# Patient Record
Sex: FEMALE | Race: WHITE | Employment: UNEMPLOYED | URBAN - METROPOLITAN AREA
[De-identification: names, ages, dates, MRNs, and addresses within clinical notes are randomized per-mention and may not be internally consistent; named-entity substitution may affect disease eponyms.]

---

## 2017-02-02 ENCOUNTER — OFFICE VISIT (OUTPATIENT)
Dept: SURGERY | Age: 42
End: 2017-02-02

## 2017-02-02 VITALS
HEART RATE: 92 BPM | DIASTOLIC BLOOD PRESSURE: 88 MMHG | SYSTOLIC BLOOD PRESSURE: 136 MMHG | HEIGHT: 67 IN | BODY MASS INDEX: 33.27 KG/M2 | WEIGHT: 212 LBS

## 2017-02-02 DIAGNOSIS — E27.40 ADRENAL INSUFFICIENCY (HCC): Primary | ICD-10-CM

## 2017-02-02 DIAGNOSIS — E31.22: ICD-10-CM

## 2017-02-02 DIAGNOSIS — E03.9 HYPOTHYROIDISM, UNSPECIFIED TYPE: ICD-10-CM

## 2017-02-02 PROCEDURE — 99203 OFFICE O/P NEW LOW 30 MIN: CPT | Performed by: INTERNAL MEDICINE

## 2017-02-02 RX ORDER — LEVOTHYROXINE SODIUM 175 UG/1
175 TABLET ORAL DAILY
Status: ON HOLD | COMMUNITY
Start: 2017-01-15 | End: 2019-08-09

## 2017-02-02 RX ORDER — ERGOCALCIFEROL 1.25 MG/1
50000 CAPSULE ORAL WEEKLY
COMMUNITY
Start: 2017-01-13 | End: 2017-03-24 | Stop reason: ALTCHOICE

## 2017-02-02 RX ORDER — CARISOPRODOL 350 MG/1
350 TABLET ORAL 3 TIMES DAILY PRN
Status: ON HOLD | COMMUNITY
Start: 2017-01-26 | End: 2019-08-09

## 2017-02-02 RX ORDER — MEDROXYPROGESTERONE ACETATE 5 MG/1
5 TABLET ORAL DAILY
COMMUNITY
Start: 2017-01-23

## 2017-02-02 ASSESSMENT — ENCOUNTER SYMPTOMS: SWOLLEN GLANDS: 0

## 2017-02-09 ASSESSMENT — ENCOUNTER SYMPTOMS: EYES NEGATIVE: 1

## 2017-03-24 ENCOUNTER — OFFICE VISIT (OUTPATIENT)
Dept: SURGERY | Age: 42
End: 2017-03-24

## 2017-03-24 VITALS
SYSTOLIC BLOOD PRESSURE: 147 MMHG | BODY MASS INDEX: 33.74 KG/M2 | WEIGHT: 215 LBS | HEIGHT: 67 IN | HEART RATE: 97 BPM | DIASTOLIC BLOOD PRESSURE: 86 MMHG

## 2017-03-24 DIAGNOSIS — E55.9 VITAMIN D DEFICIENCY: ICD-10-CM

## 2017-03-24 DIAGNOSIS — G25.0 ESSENTIAL TREMOR: ICD-10-CM

## 2017-03-24 DIAGNOSIS — E31.22: ICD-10-CM

## 2017-03-24 DIAGNOSIS — E89.0 POSTOPERATIVE HYPOTHYROIDISM: Primary | ICD-10-CM

## 2017-03-24 LAB
ANION GAP SERPL CALCULATED.3IONS-SCNC: 17 MEQ/L (ref 7–13)
BUN BLDV-MCNC: 13 MG/DL (ref 6–20)
CALCIUM SERPL-MCNC: 9.2 MG/DL (ref 8.6–10.2)
CHLORIDE BLD-SCNC: 99 MEQ/L (ref 98–107)
CO2: 18 MEQ/L (ref 22–29)
CREAT SERPL-MCNC: 0.34 MG/DL (ref 0.5–0.9)
GFR AFRICAN AMERICAN: >60
GFR NON-AFRICAN AMERICAN: >60
GLUCOSE BLD-MCNC: 76 MG/DL (ref 74–109)
POTASSIUM SERPL-SCNC: 4.6 MEQ/L (ref 3.5–5.1)
SODIUM BLD-SCNC: 134 MEQ/L (ref 132–144)
VITAMIN D 25-HYDROXY: 21.4 NG/ML (ref 30–100)

## 2017-03-24 PROCEDURE — 99213 OFFICE O/P EST LOW 20 MIN: CPT | Performed by: INTERNAL MEDICINE

## 2017-03-24 RX ORDER — ERGOCALCIFEROL 1.25 MG/1
50000 CAPSULE ORAL WEEKLY
Qty: 4 CAPSULE | Refills: 4 | Status: ON HOLD | OUTPATIENT
Start: 2017-03-24 | End: 2019-08-09

## 2017-03-27 ENCOUNTER — TELEPHONE (OUTPATIENT)
Dept: SURGERY | Age: 42
End: 2017-03-27

## 2017-03-27 ASSESSMENT — ENCOUNTER SYMPTOMS: SWOLLEN GLANDS: 0

## 2017-03-29 ENCOUNTER — TELEPHONE (OUTPATIENT)
Dept: SURGERY | Age: 42
End: 2017-03-29

## 2017-03-29 DIAGNOSIS — E31.22: Primary | ICD-10-CM

## 2017-04-03 ENCOUNTER — TELEPHONE (OUTPATIENT)
Dept: SURGERY | Age: 42
End: 2017-04-03

## 2019-08-08 ENCOUNTER — HOSPITAL ENCOUNTER (INPATIENT)
Age: 44
LOS: 5 days | Discharge: HOME | DRG: 750 | End: 2019-08-13
Attending: PSYCHIATRY & NEUROLOGY | Admitting: PSYCHIATRY & NEUROLOGY
Payer: MEDICAID

## 2019-08-08 PROCEDURE — 1240000000 HC EMOTIONAL WELLNESS R&B

## 2019-08-08 ASSESSMENT — LIFESTYLE VARIABLES: HISTORY_ALCOHOL_USE: YES

## 2019-08-08 ASSESSMENT — SLEEP AND FATIGUE QUESTIONNAIRES
DO YOU USE A SLEEP AID: NO
DO YOU HAVE DIFFICULTY SLEEPING: NO

## 2019-08-08 ASSESSMENT — PAIN SCALES - GENERAL: PAINLEVEL_OUTOF10: 0

## 2019-08-09 PROBLEM — F20.9 SCHIZOPHRENIA (HCC): Status: ACTIVE | Noted: 2019-08-09

## 2019-08-09 LAB
AMPHETAMINE SCREEN URINE: NEGATIVE
BARBITURATE SCREEN URINE: NEGATIVE
BENZODIAZEPINE SCREEN, URINE: NEGATIVE
BUPRENORPHINE URINE: NORMAL
CANNABINOID SCREEN URINE: NEGATIVE
COCAINE METABOLITE, URINE: NEGATIVE
HCG(URINE) PREGNANCY TEST: NEGATIVE
MDMA URINE: NORMAL
METHADONE SCREEN, URINE: NEGATIVE
METHAMPHETAMINE, URINE: NORMAL
OPIATES, URINE: NEGATIVE
OXYCODONE SCREEN URINE: NEGATIVE
PHENCYCLIDINE, URINE: NEGATIVE
PROPOXYPHENE, URINE: NORMAL
TEST INFORMATION: NORMAL
TRICYCLIC ANTIDEPRESSANTS, UR: NORMAL

## 2019-08-09 PROCEDURE — 6370000000 HC RX 637 (ALT 250 FOR IP): Performed by: PSYCHIATRY & NEUROLOGY

## 2019-08-09 PROCEDURE — 99222 1ST HOSP IP/OBS MODERATE 55: CPT | Performed by: NURSE PRACTITIONER

## 2019-08-09 PROCEDURE — 81025 URINE PREGNANCY TEST: CPT

## 2019-08-09 PROCEDURE — 1240000000 HC EMOTIONAL WELLNESS R&B

## 2019-08-09 PROCEDURE — 6370000000 HC RX 637 (ALT 250 FOR IP): Performed by: NURSE PRACTITIONER

## 2019-08-09 PROCEDURE — 80307 DRUG TEST PRSMV CHEM ANLYZR: CPT

## 2019-08-09 RX ORDER — LEVOTHYROXINE SODIUM 0.03 MG/1
25 TABLET ORAL DAILY
COMMUNITY

## 2019-08-09 RX ORDER — ESCITALOPRAM OXALATE 10 MG/1
5 TABLET ORAL DAILY
Status: DISCONTINUED | OUTPATIENT
Start: 2019-08-10 | End: 2019-08-10

## 2019-08-09 RX ORDER — TRAZODONE HYDROCHLORIDE 50 MG/1
50 TABLET ORAL NIGHTLY PRN
Status: DISCONTINUED | OUTPATIENT
Start: 2019-08-09 | End: 2019-08-13 | Stop reason: HOSPADM

## 2019-08-09 RX ORDER — LEVOTHYROXINE SODIUM 0.2 MG/1
200 TABLET ORAL DAILY
Status: DISCONTINUED | OUTPATIENT
Start: 2019-08-09 | End: 2019-08-13 | Stop reason: HOSPADM

## 2019-08-09 RX ORDER — MEDROXYPROGESTERONE ACETATE 2.5 MG/1
5 TABLET ORAL DAILY
Status: DISCONTINUED | OUTPATIENT
Start: 2019-08-09 | End: 2019-08-13 | Stop reason: HOSPADM

## 2019-08-09 RX ORDER — PANTOPRAZOLE SODIUM 40 MG/1
40 TABLET, DELAYED RELEASE ORAL
Status: DISCONTINUED | OUTPATIENT
Start: 2019-08-10 | End: 2019-08-13 | Stop reason: HOSPADM

## 2019-08-09 RX ORDER — LEVOTHYROXINE SODIUM 0.03 MG/1
25 TABLET ORAL DAILY
Status: DISCONTINUED | OUTPATIENT
Start: 2019-08-09 | End: 2019-08-13 | Stop reason: HOSPADM

## 2019-08-09 RX ORDER — MAGNESIUM HYDROXIDE/ALUMINUM HYDROXICE/SIMETHICONE 120; 1200; 1200 MG/30ML; MG/30ML; MG/30ML
30 SUSPENSION ORAL EVERY 6 HOURS PRN
Status: DISCONTINUED | OUTPATIENT
Start: 2019-08-09 | End: 2019-08-13 | Stop reason: HOSPADM

## 2019-08-09 RX ORDER — BENZTROPINE MESYLATE 1 MG/ML
2 INJECTION INTRAMUSCULAR; INTRAVENOUS 2 TIMES DAILY PRN
Status: DISCONTINUED | OUTPATIENT
Start: 2019-08-09 | End: 2019-08-13 | Stop reason: HOSPADM

## 2019-08-09 RX ORDER — LEVOTHYROXINE SODIUM 0.2 MG/1
200 TABLET ORAL DAILY
Status: ON HOLD | COMMUNITY
End: 2019-08-13 | Stop reason: HOSPADM

## 2019-08-09 RX ORDER — IBUPROFEN 400 MG/1
400 TABLET ORAL EVERY 6 HOURS PRN
Status: DISCONTINUED | OUTPATIENT
Start: 2019-08-09 | End: 2019-08-13 | Stop reason: HOSPADM

## 2019-08-09 RX ORDER — OMEPRAZOLE 20 MG/1
20 CAPSULE, DELAYED RELEASE ORAL DAILY
COMMUNITY

## 2019-08-09 RX ORDER — NICOTINE 21 MG/24HR
1 PATCH, TRANSDERMAL 24 HOURS TRANSDERMAL DAILY
Status: DISCONTINUED | OUTPATIENT
Start: 2019-08-09 | End: 2019-08-13 | Stop reason: HOSPADM

## 2019-08-09 RX ADMIN — MEDROXYPROGESTERONE ACETATE 5 MG: 2.5 TABLET ORAL at 12:40

## 2019-08-09 RX ADMIN — LEVOTHYROXINE SODIUM 200 MCG: 200 TABLET ORAL at 12:39

## 2019-08-09 RX ADMIN — IBUPROFEN 400 MG: 400 TABLET ORAL at 12:40

## 2019-08-09 RX ADMIN — LEVOTHYROXINE SODIUM 25 MCG: 25 TABLET ORAL at 12:40

## 2019-08-09 ASSESSMENT — LIFESTYLE VARIABLES: HISTORY_ALCOHOL_USE: NO

## 2019-08-09 ASSESSMENT — PAIN DESCRIPTION - LOCATION
LOCATION: HEAD
LOCATION: HEAD

## 2019-08-09 ASSESSMENT — PAIN SCALES - GENERAL
PAINLEVEL_OUTOF10: 2
PAINLEVEL_OUTOF10: 2
PAINLEVEL_OUTOF10: 3

## 2019-08-09 ASSESSMENT — PAIN DESCRIPTION - DESCRIPTORS: DESCRIPTORS: HEADACHE
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
## 2019-08-09 NOTE — GROUP NOTE
Group Therapy Note    Date: August 9    Group Start Time: 0970  Group End Time: 1510  Group Topic: Cognitive Skills    [facility] A    LEV Landry    Patient's Goal:  Increase cognitive skills, demonstrate increased interpersonal interaction     Notes:  Pt attended group and participated in activity. Status After Intervention:  Improved    Participation Level:  Active Listener and Interactive    Participation Quality: Appropriate, Attentive and Sharing    Speech:  normal    Thought Process/Content: Logical    Affective Functioning: Congruent    Level of consciousness:  Alert, Oriented x4 and Attentive    Response to Learning: Able to verbalize current knowledge/experience, Able to verbalize/acknowledge new learning, Able to retain information, Capable of insight and Progressing to goal    Endings: None Reported    Modes of Intervention: Education, Socialization, Exploration, Problem-solving and Activity    Discipline Responsible: Psychoeducational Specialist    Signature:  Oniel Landry

## 2019-08-10 LAB
ABSOLUTE EOS #: 0.1 K/UL (ref 0–0.4)
ABSOLUTE IMMATURE GRANULOCYTE: NORMAL K/UL (ref 0–0.3)
ABSOLUTE LYMPH #: 2.7 K/UL (ref 1–4.8)
ABSOLUTE MONO #: 0.5 K/UL (ref 0.1–1.3)
ALBUMIN SERPL-MCNC: 4.1 G/DL (ref 3.5–5.2)
ALBUMIN/GLOBULIN RATIO: ABNORMAL (ref 1–2.5)
ALP BLD-CCNC: 88 U/L (ref 35–104)
ALT SERPL-CCNC: 12 U/L (ref 5–33)
ANION GAP SERPL CALCULATED.3IONS-SCNC: 12 MMOL/L (ref 9–17)
AST SERPL-CCNC: 11 U/L
BASOPHILS # BLD: 1 % (ref 0–2)
BASOPHILS ABSOLUTE: 0.1 K/UL (ref 0–0.2)
BILIRUB SERPL-MCNC: 0.21 MG/DL (ref 0.3–1.2)
BUN BLDV-MCNC: 9 MG/DL (ref 6–20)
BUN/CREAT BLD: ABNORMAL (ref 9–20)
CALCIUM SERPL-MCNC: 9.9 MG/DL (ref 8.6–10.4)
CHLORIDE BLD-SCNC: 107 MMOL/L (ref 98–107)
CHOLESTEROL/HDL RATIO: 5.2
CHOLESTEROL: 135 MG/DL
CO2: 21 MMOL/L (ref 20–31)
CREAT SERPL-MCNC: 0.46 MG/DL (ref 0.5–0.9)
DIFFERENTIAL TYPE: NORMAL
EOSINOPHILS RELATIVE PERCENT: 1 % (ref 0–4)
GFR AFRICAN AMERICAN: >60 ML/MIN
GFR NON-AFRICAN AMERICAN: >60 ML/MIN
GFR SERPL CREATININE-BSD FRML MDRD: ABNORMAL ML/MIN/{1.73_M2}
GFR SERPL CREATININE-BSD FRML MDRD: ABNORMAL ML/MIN/{1.73_M2}
GLUCOSE BLD-MCNC: 108 MG/DL (ref 70–99)
HCT VFR BLD CALC: 42.4 % (ref 36–46)
HDLC SERPL-MCNC: 26 MG/DL
HEMOGLOBIN: 14.3 G/DL (ref 12–16)
IMMATURE GRANULOCYTES: NORMAL %
LDL CHOLESTEROL: 86 MG/DL (ref 0–130)
LYMPHOCYTES # BLD: 31 % (ref 24–44)
MCH RBC QN AUTO: 30 PG (ref 26–34)
MCHC RBC AUTO-ENTMCNC: 33.7 G/DL (ref 31–37)
MCV RBC AUTO: 88.9 FL (ref 80–100)
MONOCYTES # BLD: 6 % (ref 1–7)
NRBC AUTOMATED: NORMAL PER 100 WBC
PDW BLD-RTO: 13.6 % (ref 11.5–14.9)
PLATELET # BLD: 409 K/UL (ref 150–450)
PLATELET ESTIMATE: NORMAL
PMV BLD AUTO: 7.1 FL (ref 6–12)
POTASSIUM SERPL-SCNC: 4.3 MMOL/L (ref 3.7–5.3)
RBC # BLD: 4.76 M/UL (ref 4–5.2)
RBC # BLD: NORMAL 10*6/UL
SEG NEUTROPHILS: 61 % (ref 36–66)
SEGMENTED NEUTROPHILS ABSOLUTE COUNT: 5.2 K/UL (ref 1.3–9.1)
SODIUM BLD-SCNC: 140 MMOL/L (ref 135–144)
THYROXINE, FREE: 1.72 NG/DL (ref 0.93–1.7)
TOTAL PROTEIN: 7.1 G/DL (ref 6.4–8.3)
TRIGL SERPL-MCNC: 117 MG/DL
TSH SERPL DL<=0.05 MIU/L-ACNC: 0.6 MIU/L (ref 0.3–5)
VLDLC SERPL CALC-MCNC: ABNORMAL MG/DL (ref 1–30)
WBC # BLD: 8.6 K/UL (ref 3.5–11)
WBC # BLD: NORMAL 10*3/UL

## 2019-08-10 PROCEDURE — 6370000000 HC RX 637 (ALT 250 FOR IP): Performed by: PSYCHIATRY & NEUROLOGY

## 2019-08-10 PROCEDURE — 80061 LIPID PANEL: CPT

## 2019-08-10 PROCEDURE — 85025 COMPLETE CBC W/AUTO DIFF WBC: CPT

## 2019-08-10 PROCEDURE — 99232 SBSQ HOSP IP/OBS MODERATE 35: CPT | Performed by: NURSE PRACTITIONER

## 2019-08-10 PROCEDURE — 80053 COMPREHEN METABOLIC PANEL: CPT

## 2019-08-10 PROCEDURE — 1240000000 HC EMOTIONAL WELLNESS R&B

## 2019-08-10 PROCEDURE — 84443 ASSAY THYROID STIM HORMONE: CPT

## 2019-08-10 PROCEDURE — 36415 COLL VENOUS BLD VENIPUNCTURE: CPT

## 2019-08-10 PROCEDURE — 84439 ASSAY OF FREE THYROXINE: CPT

## 2019-08-10 PROCEDURE — 83036 HEMOGLOBIN GLYCOSYLATED A1C: CPT

## 2019-08-10 PROCEDURE — 6370000000 HC RX 637 (ALT 250 FOR IP): Performed by: NURSE PRACTITIONER

## 2019-08-10 RX ORDER — ESCITALOPRAM OXALATE 10 MG/1
10 TABLET ORAL DAILY
Status: DISCONTINUED | OUTPATIENT
Start: 2019-08-11 | End: 2019-08-13 | Stop reason: HOSPADM

## 2019-08-10 RX ORDER — PALIPERIDONE 3 MG/1
3 TABLET, EXTENDED RELEASE ORAL DAILY
Status: DISCONTINUED | OUTPATIENT
Start: 2019-08-11 | End: 2019-08-13 | Stop reason: HOSPADM

## 2019-08-10 RX ADMIN — LEVOTHYROXINE SODIUM 25 MCG: 25 TABLET ORAL at 06:45

## 2019-08-10 RX ADMIN — PANTOPRAZOLE SODIUM 40 MG: 40 TABLET, DELAYED RELEASE ORAL at 06:45

## 2019-08-10 RX ADMIN — MEDROXYPROGESTERONE ACETATE 5 MG: 2.5 TABLET ORAL at 07:46

## 2019-08-10 RX ADMIN — LEVOTHYROXINE SODIUM 200 MCG: 200 TABLET ORAL at 06:45

## 2019-08-10 RX ADMIN — ESCITALOPRAM OXALATE 5 MG: 10 TABLET ORAL at 07:45

## 2019-08-10 ASSESSMENT — PAIN SCALES - GENERAL
PAINLEVEL_OUTOF10: 2
PAINLEVEL_OUTOF10: 3

## 2019-08-10 ASSESSMENT — PAIN DESCRIPTION - PAIN TYPE
TYPE: CHRONIC PAIN
TYPE: CHRONIC PAIN

## 2019-08-10 ASSESSMENT — PAIN DESCRIPTION - LOCATION
LOCATION: LEG
LOCATION: BACK

## 2019-08-10 NOTE — BH NOTE
OPEN REC: Patient is quietly reading in the dayroom.
RN has reviewed all T2 documentation.
partner. She is adopted, never , has 2 daughters and 1 son. States she has no contact with her son. Patient receives Social Security disability income. Social History     Socioeconomic History    Marital status: Single     Spouse name: Not on file    Number of children: Not on file    Years of education: Not on file    Highest education level: Not on file   Occupational History    Not on file   Social Needs    Financial resource strain: Not on file    Food insecurity:     Worry: Not on file     Inability: Not on file    Transportation needs:     Medical: Not on file     Non-medical: Not on file   Tobacco Use    Smoking status: Current Some Day Smoker    Smokeless tobacco: Never Used   Substance and Sexual Activity    Alcohol use: Not Currently    Drug use: Not Currently    Sexual activity: Not Currently   Lifestyle    Physical activity:     Days per week: Not on file     Minutes per session: Not on file    Stress: Not on file   Relationships    Social connections:     Talks on phone: Not on file     Gets together: Not on file     Attends Yarsanism service: Not on file     Active member of club or organization: Not on file     Attends meetings of clubs or organizations: Not on file     Relationship status: Not on file    Intimate partner violence:     Fear of current or ex partner: Not on file     Emotionally abused: Not on file     Physically abused: Not on file     Forced sexual activity: Not on file   Other Topics Concern    Not on file   Social History Narrative    Not on file         Mental Status  Pt. is alert, fully oriented, and cooperative. Dressed in hospital attire with fair grooming. No psychomotor retardation or psychomotor agitation. Patient is edentulous. Speech is a bit rapid and at times dysarthric. Mood reported to be [place]. \"  Affect euthymic and appropriately reactive. Patient denies suicidal or homicidal thoughts.   Thought process tangential and mildly

## 2019-08-10 NOTE — GROUP NOTE
Group Therapy Note    Date: August 10    Group Start Time: 1100  Group End Time:   Group Topic: Group Documentation    CZ ZAYRA Kirkpatrick        Group Therapy Note    Attendees:          Patient's Goal:  ***    Notes:  ***    Status After Intervention:  {Status After Intervention:348536536}    Participation Level: {Participation Level:770212075}    Participation Quality: {[facility] PARTICIPATION QUALITY:949507035}      Speech:  {ED  CD_SPEECH:18718}      Thought Process/Content: {Thought Process/Content:249884493}      Affective Functioning: {Affective Functionin}      Mood: {Mood:225071299}      Level of consciousness:  {Level of consciousness:996276976}      Response to Learnin Lexy Sushil BHI Responses to Learnin}      Endings: {[facility] Endings:98899}    Modes of Intervention: {[facility] Modes of Intervention:751071603}      Discipline Responsible: Taylor IVERSON Multidisciplinary:245626925}      Signature:  Thanh Kirkpatrick

## 2019-08-10 NOTE — GROUP NOTE
Group Therapy Note    Date: August 10    Group Start Time: 1330  Group End Time: 1415  Group Topic: Recreational    STCZ MOI CARLO [place], [place]    Attendees: 7         Patient's Goal:  To demonstrate increased interpersonal skills. Notes:  Patient attended group and actively participated in task at hand. Patient conversed appropriately with peers and  Marking. Status After Intervention:  Improved    Participation Level:  Active Listener and Interactive    Participation Quality: Appropriate, Attentive and Sharing      Speech:  normal      Thought Process/Content: Logical      Affective Functioning: Congruent      Mood: euthymic      Level of consciousness:  Alert, Oriented x4 and Attentive      Response to Learning: Able to verbalize current knowledge/experience, Able to verbalize/acknowledge new learning, Able to retain information, Capable of insight and Progressing to goal      Endings: None Reported      Modes of Intervention: Education, Socialization, Exploration, Clarifying, Problem-solving, Activity, Limit-setting and Reality-testing      Discipline Responsible: Psychoeducational Specialist      Signature:  Sherrilee Boxer
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
## 2019-08-11 LAB
ESTIMATED AVERAGE GLUCOSE: 103 MG/DL
HBA1C MFR BLD: 5.2 % (ref 4–6)

## 2019-08-11 PROCEDURE — 99232 SBSQ HOSP IP/OBS MODERATE 35: CPT | Performed by: NURSE PRACTITIONER

## 2019-08-11 PROCEDURE — 6370000000 HC RX 637 (ALT 250 FOR IP): Performed by: PSYCHIATRY & NEUROLOGY

## 2019-08-11 PROCEDURE — 6370000000 HC RX 637 (ALT 250 FOR IP): Performed by: NURSE PRACTITIONER

## 2019-08-11 PROCEDURE — 1240000000 HC EMOTIONAL WELLNESS R&B

## 2019-08-11 RX ADMIN — MEDROXYPROGESTERONE ACETATE 5 MG: 2.5 TABLET ORAL at 08:29

## 2019-08-11 RX ADMIN — LEVOTHYROXINE SODIUM 200 MCG: 200 TABLET ORAL at 07:28

## 2019-08-11 RX ADMIN — PANTOPRAZOLE SODIUM 40 MG: 40 TABLET, DELAYED RELEASE ORAL at 07:27

## 2019-08-11 RX ADMIN — ESCITALOPRAM OXALATE 10 MG: 10 TABLET ORAL at 08:30

## 2019-08-11 RX ADMIN — LEVOTHYROXINE SODIUM 25 MCG: 25 TABLET ORAL at 07:28

## 2019-08-11 RX ADMIN — PALIPERIDONE 3 MG: 3 TABLET, EXTENDED RELEASE ORAL at 08:29

## 2019-08-11 NOTE — GROUP NOTE
Group Therapy Note    Date: August 11    Group Start Time: 1000  Group End Time: 5704  Group Topic: Psychoeducation    [facility] CARLO    SULEMA Costa, LSW        Group Therapy Note    Attendees: 8         Patient's Goal:  Pt will demonstrate increased interpersonal interaction. Notes:  Group topic was dysfunctional family roles.     Status After Intervention:  Improved    Participation Level: Interactive    Participation Quality: Appropriate      Speech:  normal      Thought Process/Content: Logical      Affective Functioning: Congruent      Mood: irritable      Level of consciousness:  Alert      Response to Learning: Progressing to goal      Endings: None Reported    Modes of Intervention: Education      Discipline Responsible: /Counselor      Signature:  SULEMA Costa, HOLLEY

## 2019-08-11 NOTE — PROGRESS NOTES
Department of Psychiatry  Attending Progress Note      Chief Complaint: Schizophrenia, undifferentiated type     SUBJECTIVE:  Patient seen in treatment team.  She presents with poor hygiene and grooming. States she does not want to shower but will \"clean up in my bathroom. \" She exhibits mild involuntary mouth movement, suspected to be from long-term use antipsychotic medications. Patient continues to endorse depressed mood, discussed issues with her mother and daughter and states she will avoid them by staying in her room at home. She denies any suicidal or homicidal thoughts. Denies any auditory or visual hallucinations. During the interview, patient makes numerous illogical comments and appears to be delusional at times. She refused to consider antipsychotic medication at the time of admission, however patient states she will consider medication when staff suggested a long-acting injectable. In a subsequent discussion, she agrees to start [place]. OBJECTIVE    Physical  BP (!) 138/57   Pulse 83   Temp 98.4 °F (36.9 °C) (Oral)   Resp 14   Ht 6' (1.829 m)   Wt 204 lb (92.5 kg)   LMP 06/08/2019   BMI 27.67 kg/m²      Mental Status Evaluation:  Orientation: alertness:alert   Mood:.  \"depressed\"      Affect:  Euthymic      Appearance:  Disheveled, hair unwashed, hospital attire   Activity:  Calm, no agitation   Gait/Posture: Normal   Speech:  A bit rapid, normal volume   Thought Process:  Mildly disorganized   Thought Content:  Denies hallucinations, some evidence of delusions   Sensorium:  person, place and time/date   Cognition:  grossly intact   Memory: intact   Insight:  limited   Judgment: limited   Suicidal Ideations: denies suicidal ideation   Homicidal Ideations: Negative for homicidal ideation      Medication Side Effects: absent       Attention Span Mildly impaired       Labs  Recent Results (from the past 72 hour(s))   Pregnancy, urine    Collection Time: 08/09/19  2:18 PM   Result Value Ref magnesium hydroxide (MILK OF MAGNESIA) 400 MG/5ML suspension 30 mL  30 mL Oral Daily PRN JOSE LUIS Barriga        aluminum & magnesium hydroxide-simethicone (MAALOX) 200-200-20 MG/5ML suspension 30 mL  30 mL Oral Q6H PRN JOSE LUIS Barriga        pantoprazole (PROTONIX) tablet 40 mg  40 mg Oral QAM AC Kinga Rowley APN   40 mg at 08/10/19 0645    levothyroxine (SYNTHROID) tablet 200 mcg  200 mcg Oral Daily Kinga Rowley APN   200 mcg at 08/10/19 0645    levothyroxine (SYNTHROID) tablet 25 mcg  25 mcg Oral Daily Kinga Rowley APN   25 mcg at 08/10/19 0645    medroxyPROGESTERone (PROVERA) tablet 5 mg  5 mg Oral Daily FERDINAND BarrigaN   5 mg at 08/10/19 0746    ibuprofen (ADVIL;MOTRIN) tablet 400 mg  400 mg Oral Q6H PRN FERDINAND BarrigaN   400 mg at 08/09/19 1240         [START ON 8/11/2019] escitalopram  10 mg Oral Daily    [START ON 8/11/2019] paliperidone  3 mg Oral Daily    nicotine  1 patch Transdermal Daily    pantoprazole  40 mg Oral QAM AC    levothyroxine  200 mcg Oral Daily    levothyroxine  25 mcg Oral Daily    medroxyPROGESTERone  5 mg Oral Daily       ASSESSMENT:    Schizophrenia, undifferentiated type    Patient's Response to Treatment: positive    PLAN    · Admit to inpatient psychiatric treatment  · Supportive therapy with medication management. · Started Lexapro 5 mg daily and increase to 10 mg nayak starting 8/10. .  · Start Invega 3 mg with a plan for Invega Sustenna WILDER. School  Reviewed risks and benefits as well as potential side effects with patient. · Therapeutic activities and groups  · Follow up at [facility] after symptoms stabilized    Electronically signed by JOSE LUIS Barriga on 8/10/2019 at 8:03 PM.    Dragon voice recognition software used in portions of this document.

## 2019-08-12 PROCEDURE — 6370000000 HC RX 637 (ALT 250 FOR IP): Performed by: PSYCHIATRY & NEUROLOGY

## 2019-08-12 PROCEDURE — 6370000000 HC RX 637 (ALT 250 FOR IP): Performed by: NURSE PRACTITIONER

## 2019-08-12 PROCEDURE — 99231 SBSQ HOSP IP/OBS SF/LOW 25: CPT | Performed by: NURSE PRACTITIONER

## 2019-08-12 PROCEDURE — 1240000000 HC EMOTIONAL WELLNESS R&B

## 2019-08-12 RX ORDER — ESCITALOPRAM OXALATE 10 MG/1
10 TABLET ORAL DAILY
Qty: 14 TABLET | Refills: 0 | Status: CANCELLED | OUTPATIENT
Start: 2019-08-13

## 2019-08-12 RX ORDER — TRAZODONE HYDROCHLORIDE 50 MG/1
50 TABLET ORAL NIGHTLY PRN
Qty: 14 TABLET | Refills: 0 | Status: CANCELLED | OUTPATIENT
Start: 2019-08-12

## 2019-08-12 RX ORDER — PANTOPRAZOLE SODIUM 40 MG/1
40 TABLET, DELAYED RELEASE ORAL
Qty: 14 TABLET | Refills: 0 | Status: CANCELLED | OUTPATIENT
Start: 2019-08-13

## 2019-08-12 RX ORDER — PALIPERIDONE 3 MG/1
3 TABLET, EXTENDED RELEASE ORAL DAILY
Qty: 14 TABLET | Refills: 0 | Status: CANCELLED | OUTPATIENT
Start: 2019-08-13

## 2019-08-12 RX ADMIN — MEDROXYPROGESTERONE ACETATE 5 MG: 2.5 TABLET ORAL at 08:33

## 2019-08-12 RX ADMIN — ESCITALOPRAM OXALATE 10 MG: 10 TABLET ORAL at 08:33

## 2019-08-12 RX ADMIN — LEVOTHYROXINE SODIUM 25 MCG: 25 TABLET ORAL at 06:54

## 2019-08-12 RX ADMIN — PANTOPRAZOLE SODIUM 40 MG: 40 TABLET, DELAYED RELEASE ORAL at 08:32

## 2019-08-12 RX ADMIN — LEVOTHYROXINE SODIUM 200 MCG: 200 TABLET ORAL at 06:54

## 2019-08-12 RX ADMIN — PALIPERIDONE 3 MG: 3 TABLET, EXTENDED RELEASE ORAL at 08:32

## 2019-08-12 NOTE — PROGRESS NOTES
NOT REPORTED per 100 WBC    Differential Type NOT REPORTED     Seg Neutrophils 61 36 - 66 %    Lymphocytes 31 24 - 44 %    Monocytes 6 1 - 7 %    Eosinophils % 1 0 - 4 %    Basophils 1 0 - 2 %    Immature Granulocytes NOT REPORTED 0 %    Segs Absolute 5.20 1.3 - 9.1 k/uL    Absolute Lymph # 2.70 1.0 - 4.8 k/uL    Absolute Mono # 0.50 0.1 - 1.3 k/uL    Absolute Eos # 0.10 0.0 - 0.4 k/uL    Basophils # 0.10 0.0 - 0.2 k/uL    Absolute Immature Granulocyte NOT REPORTED 0.00 - 0.30 k/uL    WBC Morphology NOT REPORTED     RBC Morphology NOT REPORTED     Platelet Estimate NOT REPORTED        Medications  Current Facility-Administered Medications   Medication Dose Route Frequency Provider Last Rate Last Dose    escitalopram (LEXAPRO) tablet 10 mg  10 mg Oral Daily Buddie Oms, APN   10 mg at 08/12/19 0990    paliperidone (INVEGA) extended release tablet 3 mg  3 mg Oral Daily Buddie Oms, APN   3 mg at 08/12/19 4588    nicotine (NICODERM CQ) 14 MG/24HR 1 patch  1 patch Transdermal Daily Josselyn Alexis MD   1 patch at 08/12/19 9308    traZODone (DESYREL) tablet 50 mg  50 mg Oral Nightly PRN Josselyn Alexis MD        benztropine mesylate (COGENTIN) injection 2 mg  2 mg Intramuscular BID PRN Buddie Oms, APN        magnesium hydroxide (MILK OF MAGNESIA) 400 MG/5ML suspension 30 mL  30 mL Oral Daily PRN Buddie Oms, APN        aluminum & magnesium hydroxide-simethicone (MAALOX) 200-200-20 MG/5ML suspension 30 mL  30 mL Oral Q6H PRN Buddie Oms, APN        pantoprazole (PROTONIX) tablet 40 mg  40 mg Oral QAM AC Buddie Oms, APN   40 mg at 08/12/19 3035    levothyroxine (SYNTHROID) tablet 200 mcg  200 mcg Oral Daily Buddie Oms, APN   200 mcg at 08/12/19 0654    levothyroxine (SYNTHROID) tablet 25 mcg  25 mcg Oral Daily Buddie Oms, APN   25 mcg at 08/12/19 0654    medroxyPROGESTERone (PROVERA) tablet 5 mg  5 mg Oral Daily Buddie Oms, APN   5 mg at 08/12/19 0833    ibuprofen (ADVIL;MOTRIN) tablet 400

## 2019-08-12 NOTE — PROGRESS NOTES
aluminum & magnesium hydroxide-simethicone (MAALOX) 200-200-20 MG/5ML suspension 30 mL  30 mL Oral Q6H PRN JOSE LUIS Marsh        pantoprazole (PROTONIX) tablet 40 mg  40 mg Oral QAM AC Derinda Josi, APN   40 mg at 08/11/19 0939    levothyroxine (SYNTHROID) tablet 200 mcg  200 mcg Oral Daily Derinda Josi, APN   200 mcg at 08/11/19 7328    levothyroxine (SYNTHROID) tablet 25 mcg  25 mcg Oral Daily Renaninda Josi, APN   25 mcg at 08/11/19 6995    medroxyPROGESTERone (PROVERA) tablet 5 mg  5 mg Oral Daily Maryana Prater APN   5 mg at 08/11/19 3859    ibuprofen (ADVIL;MOTRIN) tablet 400 mg  400 mg Oral Q6H PRN Maryana Prater, APN   400 mg at 08/09/19 1240         escitalopram  10 mg Oral Daily    paliperidone  3 mg Oral Daily    nicotine  1 patch Transdermal Daily    pantoprazole  40 mg Oral QAM AC    levothyroxine  200 mcg Oral Daily    levothyroxine  25 mcg Oral Daily    medroxyPROGESTERone  5 mg Oral Daily       ASSESSMENT:    Schizophrenia, undifferentiated type    Patient's Response to Treatment: positive    PLAN    · Admit to inpatient psychiatric treatment  · Supportive therapy with medication management. · Started Lexapro 5 mg daily and increase to 10 mg nayak starting 8/10. .  · Start Invega 3 mg with a plan for Invega Sustenna WILDER. Injectable. Reviewed risks and benefits as well as potential side effects with patient. Consider initial dose of 156 mg, as patient reports she had significant side effects from Risperdal Consta in the past.  · Therapeutic activities and groups  · Follow up at [facility] after symptoms stabilized    Electronically signed by JOSE LUIS Marsh on 8/11/2019 at 8:58 PM.    Dragon voice recognition software used in portions of this document.

## 2019-08-12 NOTE — GROUP NOTE
Group Therapy Note    Date: August 12    Group Start Time: 1330  Group End Time: 7827  Group Topic: Psychoeducation    KASHIF HASSANI CARLO Sheets    Patient refused to attend recreational therapy group at 1330 despite encouragement from staff. 1:1 talk time offered, but refused.          Signature:  Aurelio Sheets

## 2019-08-12 NOTE — PLAN OF CARE
Problem: Anger Management/Homicidal Ideation:  Goal: Ability to verbalize frustrations and anger appropriately will improve  Description  LTG Ability to verbalize frustrations and anger appropriately will improve   Outcome: Ongoing  Note:   Pt verbalized no anger or frustrations at this time. Problem: Anger Management/Homicidal Ideation:  Goal: Absence of angry outbursts  Description  STG Absence of angry outbursts   Outcome: Ongoing  Note:   Pt had no incidents of angry outbursts at this time. Problem: Altered Mood, Deterioration in Function:  Goal: Ability to perform activities of daily living will improve  Description  LTG Ability to perform activities of daily living will improve   8/11/2019 2316 by Marty Romero LPN  Outcome: Ongoing  Note:   Pt isolated to her room for the most part of the shift. Problem: Altered Mood, Deterioration in Function:  Goal: Maintenance of adequate nutrition will improve  Description  STG Maintenance of adequate nutrition will improve   8/11/2019 2316 by Marty Romero LPN  Outcome: Ongoing  Note:   Pt was asleep during snack and did not come out of room for snacks. Problem: Tobacco Use:  Goal: Inpatient tobacco use cessation counseling participation  Description  Inpatient tobacco use cessation counseling participation  Outcome: Ongoing  Note:   Pt refused to participate in inpatient tobacco use counseling at this time. Problem: Pain:  Goal: Pain level will decrease  Description  Pain level will decrease  Outcome: Ongoing  Note:   Pt admits that her pain level has decreased at this time.

## 2019-08-13 VITALS
RESPIRATION RATE: 14 BRPM | HEART RATE: 76 BPM | BODY MASS INDEX: 27.63 KG/M2 | TEMPERATURE: 98.6 F | DIASTOLIC BLOOD PRESSURE: 77 MMHG | SYSTOLIC BLOOD PRESSURE: 136 MMHG | HEIGHT: 72 IN | WEIGHT: 204 LBS

## 2019-08-13 PROCEDURE — 5130000000 HC BRIDGE APPOINTMENT

## 2019-08-13 PROCEDURE — 99238 HOSP IP/OBS DSCHRG MGMT 30/<: CPT | Performed by: NURSE PRACTITIONER

## 2019-08-13 PROCEDURE — 6370000000 HC RX 637 (ALT 250 FOR IP): Performed by: PSYCHIATRY & NEUROLOGY

## 2019-08-13 PROCEDURE — 6370000000 HC RX 637 (ALT 250 FOR IP): Performed by: NURSE PRACTITIONER

## 2019-08-13 RX ORDER — TRAZODONE HYDROCHLORIDE 50 MG/1
50 TABLET ORAL NIGHTLY PRN
Qty: 30 TABLET | Refills: 0 | Status: SHIPPED | OUTPATIENT
Start: 2019-08-13

## 2019-08-13 RX ORDER — ESCITALOPRAM OXALATE 10 MG/1
10 TABLET ORAL DAILY
Qty: 30 TABLET | Refills: 0 | Status: SHIPPED | OUTPATIENT
Start: 2019-08-14

## 2019-08-13 RX ORDER — PALIPERIDONE 3 MG/1
3 TABLET, EXTENDED RELEASE ORAL DAILY
Qty: 30 TABLET | Refills: 0 | Status: SHIPPED | OUTPATIENT
Start: 2019-08-14

## 2019-08-13 RX ADMIN — PALIPERIDONE 3 MG: 3 TABLET, EXTENDED RELEASE ORAL at 09:06

## 2019-08-13 RX ADMIN — MEDROXYPROGESTERONE ACETATE 5 MG: 2.5 TABLET ORAL at 09:06

## 2019-08-13 RX ADMIN — PANTOPRAZOLE SODIUM 40 MG: 40 TABLET, DELAYED RELEASE ORAL at 07:00

## 2019-08-13 RX ADMIN — LEVOTHYROXINE SODIUM 25 MCG: 25 TABLET ORAL at 07:01

## 2019-08-13 RX ADMIN — LEVOTHYROXINE SODIUM 200 MCG: 200 TABLET ORAL at 07:00

## 2019-08-13 RX ADMIN — ESCITALOPRAM OXALATE 10 MG: 10 TABLET ORAL at 09:06

## 2019-08-13 NOTE — PLAN OF CARE
Problem: Altered Mood, Deterioration in Function:  Goal: Ability to perform activities of daily living will improve  Description  LTG Ability to perform activities of daily living will improve   8/12/2019 2124 by Milfay   Outcome: Met This Shift     Problem: Altered Mood, Deterioration in Function:  Goal: Maintenance of adequate nutrition will improve  Description  STG Maintenance of adequate nutrition will improve   8/12/2019 2124 by Milfay Ee  Outcome: Met This Shift     Problem: Pain:  Goal: Pain level will decrease  Description  Pain level will decrease  8/12/2019 2124 by [facility]  Outcome: Met This Shift  Pt is active & social on unit. Attends group with good participation,  continues to minimize her reason for admission. Denies suicidal thoughts, denies hallucinations. Compliant with medicine, has a good appetite. Pt has FU plans in order for current D/C for Tuesday.

## 2019-08-13 NOTE — DISCHARGE SUMMARY
and are not necessarily indicative of current findings): Hospital Course:   Upon admission, Rbo Magdaleno was provided a safe secure environment, introduced to unit milieu. Patient participated in groups and individual therapies. Meds were adjusted. After few days of hospital care, patient began to feel improvement. Depression lifted, thoughts to harm self ceased. Sleep improved, appetite was good. On morning rounds 8/13/2019, patient endorsed feeling ready for discharge. Patient denies suicidal or homicidal ideations, denies hallucinations or delusions. Denies SE's from meds. It was decided that pt had achieved maximum benefit from hospital care and can be discharged     Consults: None    Significant Diagnostic Studies: Routine labs and diagnostics    Treatments: Psychotropic medications, therapy with group, milieu, and 1:1 with nurses, social workers, PARALPH/CNP, and Attending physician.       Discharge Medications:  Current Discharge Medication List      START taking these medications    Details   escitalopram (LEXAPRO) 10 MG tablet Take 1 tablet by mouth daily  Qty: 30 tablet, Refills: 0      traZODone (DESYREL) 50 MG tablet Take 1 tablet by mouth nightly as needed for Sleep  Qty: 30 tablet, Refills: 0      paliperidone (INVEGA) 3 MG extended release tablet Take 1 tablet by mouth daily  Qty: 30 tablet, Refills: 0         CONTINUE these medications which have NOT CHANGED    Details   omeprazole (PRILOSEC) 20 MG delayed release capsule Take 20 mg by mouth daily      levothyroxine (SYNTHROID) 25 MCG tablet Take 25 mcg by mouth Daily Takes with 200 mcg tablet      medroxyPROGESTERone (PROVERA) 5 MG tablet Take 5 mg by mouth daily          STOP taking these medications       vitamin D (ERGOCALCIFEROL) 72571 UNITS CAPS capsule Comments:   Reason for Stopping:         carisoprodol (SOMA) 350 MG tablet Comments:   Reason for Stopping:              Core Measures statement:   Not applicable    Discharge Exam:  Level
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
## 2019-08-13 NOTE — GROUP NOTE
Group Therapy Note    Date: August 13    Group Start Time: 0845  Group End Time: 0900  Group Topic: Community Meeting    KASHIF IVERSON CARLO Bruno        Group Therapy Note    Attendees: 8/14       Patient's Goal:  Pt stated that her goal for today is to be discharged. Notes:  Pt stated that once she is discharged she looks forward to going home and playing with her dogs. Pt stated that she needs to work on getting along with her family. Status After Intervention:  Improved    Participation Level:  Active Listener    Participation Quality: Appropriate and Attentive      Speech:  normal      Thought Process/Content: Logical      Affective Functioning: Congruent      Mood: euthymic      Level of consciousness:  Alert, Oriented x4 and Attentive      Response to Learning: Able to verbalize current knowledge/experience, Able to verbalize/acknowledge new learning and Progressing to goal      Endings: None Reported    Modes of Intervention: Education, Support, Socialization and Problem-solving      Discipline Responsible: Psychoeducational Specialist      Signature:  iVnnie Bruno

## 2022-10-06 ENCOUNTER — HOSPITAL ENCOUNTER (OUTPATIENT)
Dept: PSYCHIATRY | Age: 47
Setting detail: THERAPIES SERIES
Discharge: HOME | End: 2022-10-06
Payer: MEDICAID

## 2022-10-06 DIAGNOSIS — F31.9 BIPOLAR DEPRESSION (HCC): ICD-10-CM

## 2022-10-06 PROCEDURE — 90792 PSYCH DIAG EVAL W/MED SRVCS: CPT | Performed by: PSYCHIATRY & NEUROLOGY

## 2022-10-06 PROCEDURE — 90791 PSYCH DIAGNOSTIC EVALUATION: CPT | Performed by: PSYCHIATRY & NEUROLOGY
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
## 2024-10-16 ENCOUNTER — HOSPITAL ENCOUNTER (INPATIENT)
Age: 49
LOS: 1 days | Discharge: PSYCHIATRIC OR SUBSTANCE USE FACILITY | DRG: 751 | End: 2024-10-17
Attending: EMERGENCY MEDICINE | Admitting: INTERNAL MEDICINE
Payer: COMMERCIAL

## 2024-10-16 DIAGNOSIS — E03.9 HYPOTHYROIDISM, UNSPECIFIED TYPE: Primary | ICD-10-CM

## 2024-10-16 PROBLEM — F32.A DEPRESSION: Status: ACTIVE | Noted: 2024-10-16

## 2024-10-16 LAB
AMPHET UR QL SCN: NEGATIVE
ANION GAP SERPL CALCULATED.3IONS-SCNC: 12 MMOL/L (ref 9–16)
BARBITURATES UR QL SCN: NEGATIVE
BASOPHILS # BLD: 0.09 K/UL (ref 0–0.2)
BASOPHILS NFR BLD: 1 % (ref 0–2)
BENZODIAZ UR QL: NEGATIVE
BILIRUB UR QL STRIP: NEGATIVE
BUN SERPL-MCNC: 15 MG/DL (ref 6–20)
CALCIUM SERPL-MCNC: 10.4 MG/DL (ref 8.6–10.4)
CANNABINOIDS UR QL SCN: NEGATIVE
CHLORIDE SERPL-SCNC: 102 MMOL/L (ref 98–107)
CLARITY UR: CLEAR
CO2 SERPL-SCNC: 22 MMOL/L (ref 20–31)
COCAINE UR QL SCN: NEGATIVE
COLOR UR: YELLOW
COMMENT: NORMAL
CORTIS SERPL-MCNC: 7.6 UG/DL (ref 2.5–19.5)
CREAT SERPL-MCNC: 0.9 MG/DL (ref 0.7–1.2)
EOSINOPHIL # BLD: 0.19 K/UL (ref 0–0.4)
EOSINOPHILS RELATIVE PERCENT: 2 % (ref 0–4)
ERYTHROCYTE [DISTWIDTH] IN BLOOD BY AUTOMATED COUNT: 17.6 % (ref 11.5–14.9)
FENTANYL UR QL: NEGATIVE
FLUAV RNA RESP QL NAA+PROBE: NOT DETECTED
FLUBV RNA RESP QL NAA+PROBE: NOT DETECTED
GFR, ESTIMATED: 78 ML/MIN/1.73M2
GLUCOSE SERPL-MCNC: 97 MG/DL (ref 74–99)
GLUCOSE UR STRIP-MCNC: NEGATIVE MG/DL
HCT VFR BLD AUTO: 39.3 % (ref 36–46)
HGB BLD-MCNC: 13.6 G/DL (ref 12–16)
HGB UR QL STRIP.AUTO: NEGATIVE
KETONES UR STRIP-MCNC: NEGATIVE MG/DL
LEUKOCYTE ESTERASE UR QL STRIP: NEGATIVE
LYMPHOCYTES NFR BLD: 2.6 K/UL (ref 1–4.8)
LYMPHOCYTES RELATIVE PERCENT: 28 % (ref 24–44)
MCH RBC QN AUTO: 30.2 PG (ref 26–34)
MCHC RBC AUTO-ENTMCNC: 34.6 G/DL (ref 31–37)
MCV RBC AUTO: 87.4 FL (ref 80–100)
METHADONE UR QL: NEGATIVE
MONOCYTES NFR BLD: 0.47 K/UL (ref 0.1–1.3)
MONOCYTES NFR BLD: 5 % (ref 1–7)
MORPHOLOGY: ABNORMAL
NEUTROPHILS NFR BLD: 64 % (ref 36–66)
NEUTS SEG NFR BLD: 5.95 K/UL (ref 1.3–9.1)
NITRITE UR QL STRIP: NEGATIVE
OPIATES UR QL SCN: NEGATIVE
OXYCODONE UR QL SCN: NEGATIVE
PCP UR QL SCN: NEGATIVE
PH UR STRIP: 5.5 [PH] (ref 5–8)
PLATELET # BLD AUTO: 379 K/UL (ref 150–450)
PMV BLD AUTO: 6.7 FL (ref 6–12)
POTASSIUM SERPL-SCNC: 5 MMOL/L (ref 3.7–5.3)
PROT UR STRIP-MCNC: NEGATIVE MG/DL
RBC # BLD AUTO: 4.5 M/UL (ref 4–5.2)
SARS-COV-2 RNA RESP QL NAA+PROBE: NOT DETECTED
SODIUM SERPL-SCNC: 136 MMOL/L (ref 136–145)
SOURCE: NORMAL
SP GR UR STRIP: 1.01 (ref 1–1.03)
SPECIMEN DESCRIPTION: NORMAL
T4 FREE SERPL-MCNC: 0.2 NG/DL (ref 0.9–1.7)
TEST INFORMATION: NORMAL
TSH SERPL DL<=0.05 MIU/L-ACNC: 189 UIU/ML (ref 0.27–4.2)
UROBILINOGEN UR STRIP-ACNC: NORMAL EU/DL (ref 0–1)
WBC OTHER # BLD: 9.3 K/UL (ref 3.5–11)

## 2024-10-16 PROCEDURE — 99285 EMERGENCY DEPT VISIT HI MDM: CPT

## 2024-10-16 PROCEDURE — 82533 TOTAL CORTISOL: CPT

## 2024-10-16 PROCEDURE — 2580000003 HC RX 258: Performed by: INTERNAL MEDICINE

## 2024-10-16 PROCEDURE — 36415 COLL VENOUS BLD VENIPUNCTURE: CPT

## 2024-10-16 PROCEDURE — 99223 1ST HOSP IP/OBS HIGH 75: CPT | Performed by: INTERNAL MEDICINE

## 2024-10-16 PROCEDURE — 84439 ASSAY OF FREE THYROXINE: CPT

## 2024-10-16 PROCEDURE — 6370000000 HC RX 637 (ALT 250 FOR IP): Performed by: INTERNAL MEDICINE

## 2024-10-16 PROCEDURE — 84443 ASSAY THYROID STIM HORMONE: CPT

## 2024-10-16 PROCEDURE — 87636 SARSCOV2 & INF A&B AMP PRB: CPT

## 2024-10-16 PROCEDURE — 2060000000 HC ICU INTERMEDIATE R&B

## 2024-10-16 PROCEDURE — 85025 COMPLETE CBC W/AUTO DIFF WBC: CPT

## 2024-10-16 PROCEDURE — 81003 URINALYSIS AUTO W/O SCOPE: CPT

## 2024-10-16 PROCEDURE — 6360000002 HC RX W HCPCS: Performed by: INTERNAL MEDICINE

## 2024-10-16 PROCEDURE — 80307 DRUG TEST PRSMV CHEM ANLYZR: CPT

## 2024-10-16 PROCEDURE — 80048 BASIC METABOLIC PNL TOTAL CA: CPT

## 2024-10-16 RX ORDER — LANOLIN ALCOHOL/MO/W.PET/CERES
1000 CREAM (GRAM) TOPICAL DAILY
Status: ON HOLD | COMMUNITY

## 2024-10-16 RX ORDER — OLANZAPINE 5 MG/1
5 TABLET ORAL EVERY 6 HOURS PRN
Status: ON HOLD | COMMUNITY

## 2024-10-16 RX ORDER — ENOXAPARIN SODIUM 100 MG/ML
40 INJECTION SUBCUTANEOUS DAILY
Status: DISCONTINUED | OUTPATIENT
Start: 2024-10-17 | End: 2024-10-17 | Stop reason: HOSPADM

## 2024-10-16 RX ORDER — POTASSIUM CHLORIDE 1500 MG/1
40 TABLET, EXTENDED RELEASE ORAL PRN
Status: DISCONTINUED | OUTPATIENT
Start: 2024-10-16 | End: 2024-10-17 | Stop reason: HOSPADM

## 2024-10-16 RX ORDER — ACETAMINOPHEN 650 MG/1
650 SUPPOSITORY RECTAL EVERY 6 HOURS PRN
Status: DISCONTINUED | OUTPATIENT
Start: 2024-10-16 | End: 2024-10-17

## 2024-10-16 RX ORDER — POLYETHYLENE GLYCOL 3350 17 G/17G
17 POWDER, FOR SOLUTION ORAL DAILY PRN
Status: DISCONTINUED | OUTPATIENT
Start: 2024-10-16 | End: 2024-10-17 | Stop reason: HOSPADM

## 2024-10-16 RX ORDER — LANOLIN ALCOHOL/MO/W.PET/CERES
1000 CREAM (GRAM) TOPICAL DAILY
Status: DISCONTINUED | OUTPATIENT
Start: 2024-10-17 | End: 2024-10-17 | Stop reason: HOSPADM

## 2024-10-16 RX ORDER — SODIUM CHLORIDE 9 MG/ML
INJECTION, SOLUTION INTRAVENOUS PRN
Status: DISCONTINUED | OUTPATIENT
Start: 2024-10-16 | End: 2024-10-17 | Stop reason: HOSPADM

## 2024-10-16 RX ORDER — PANTOPRAZOLE SODIUM 40 MG/1
40 TABLET, DELAYED RELEASE ORAL
Status: DISCONTINUED | OUTPATIENT
Start: 2024-10-17 | End: 2024-10-17 | Stop reason: HOSPADM

## 2024-10-16 RX ORDER — TRAZODONE HYDROCHLORIDE 50 MG/1
50 TABLET, FILM COATED ORAL NIGHTLY PRN
Status: DISCONTINUED | OUTPATIENT
Start: 2024-10-16 | End: 2024-10-17 | Stop reason: HOSPADM

## 2024-10-16 RX ORDER — MAGNESIUM SULFATE HEPTAHYDRATE 40 MG/ML
2000 INJECTION, SOLUTION INTRAVENOUS PRN
Status: DISCONTINUED | OUTPATIENT
Start: 2024-10-16 | End: 2024-10-17 | Stop reason: HOSPADM

## 2024-10-16 RX ORDER — FOLIC ACID 1 MG/1
1 TABLET ORAL DAILY
Status: DISCONTINUED | OUTPATIENT
Start: 2024-10-17 | End: 2024-10-17 | Stop reason: HOSPADM

## 2024-10-16 RX ORDER — FOLIC ACID 1 MG/1
1 TABLET ORAL DAILY
Status: ON HOLD | COMMUNITY

## 2024-10-16 RX ORDER — ONDANSETRON 2 MG/ML
4 INJECTION INTRAMUSCULAR; INTRAVENOUS EVERY 6 HOURS PRN
Status: DISCONTINUED | OUTPATIENT
Start: 2024-10-16 | End: 2024-10-17 | Stop reason: HOSPADM

## 2024-10-16 RX ORDER — HYDROCORTISONE 10 MG/1
TABLET ORAL
Status: ON HOLD | COMMUNITY

## 2024-10-16 RX ORDER — FERROUS SULFATE 325(65) MG
325 TABLET ORAL 2 TIMES DAILY
Status: ON HOLD | COMMUNITY

## 2024-10-16 RX ORDER — FLUDROCORTISONE ACETATE 0.1 MG/1
0.1 TABLET ORAL DAILY
Status: ON HOLD | COMMUNITY

## 2024-10-16 RX ORDER — SODIUM CHLORIDE 0.9 % (FLUSH) 0.9 %
5-40 SYRINGE (ML) INJECTION EVERY 12 HOURS SCHEDULED
Status: DISCONTINUED | OUTPATIENT
Start: 2024-10-16 | End: 2024-10-17 | Stop reason: HOSPADM

## 2024-10-16 RX ORDER — OLANZAPINE 10 MG/1
5 TABLET ORAL EVERY 6 HOURS PRN
Status: DISCONTINUED | OUTPATIENT
Start: 2024-10-16 | End: 2024-10-17 | Stop reason: HOSPADM

## 2024-10-16 RX ORDER — LEVOTHYROXINE SODIUM 75 UG/1
150 TABLET ORAL DAILY
Status: DISCONTINUED | OUTPATIENT
Start: 2024-10-17 | End: 2024-10-17 | Stop reason: HOSPADM

## 2024-10-16 RX ORDER — ACETAMINOPHEN 325 MG/1
650 TABLET ORAL EVERY 6 HOURS PRN
Status: DISCONTINUED | OUTPATIENT
Start: 2024-10-16 | End: 2024-10-17

## 2024-10-16 RX ORDER — POTASSIUM CHLORIDE 7.45 MG/ML
10 INJECTION INTRAVENOUS PRN
Status: DISCONTINUED | OUTPATIENT
Start: 2024-10-16 | End: 2024-10-17 | Stop reason: HOSPADM

## 2024-10-16 RX ORDER — ONDANSETRON 4 MG/1
4 TABLET, ORALLY DISINTEGRATING ORAL EVERY 8 HOURS PRN
Status: DISCONTINUED | OUTPATIENT
Start: 2024-10-16 | End: 2024-10-17 | Stop reason: HOSPADM

## 2024-10-16 RX ORDER — SODIUM CHLORIDE 0.9 % (FLUSH) 0.9 %
5-40 SYRINGE (ML) INJECTION PRN
Status: DISCONTINUED | OUTPATIENT
Start: 2024-10-16 | End: 2024-10-17 | Stop reason: HOSPADM

## 2024-10-16 RX ORDER — FERROUS SULFATE 325(65) MG
325 TABLET ORAL 2 TIMES DAILY
Status: DISCONTINUED | OUTPATIENT
Start: 2024-10-16 | End: 2024-10-17 | Stop reason: HOSPADM

## 2024-10-16 RX ORDER — SODIUM CHLORIDE 9 MG/ML
INJECTION, SOLUTION INTRAVENOUS CONTINUOUS
Status: DISCONTINUED | OUTPATIENT
Start: 2024-10-16 | End: 2024-10-17

## 2024-10-16 RX ADMIN — HYDROCORTISONE SODIUM SUCCINATE 100 MG: 100 INJECTION, POWDER, FOR SOLUTION INTRAMUSCULAR; INTRAVENOUS at 22:10

## 2024-10-16 RX ADMIN — AMITRIPTYLINE HYDROCHLORIDE 25 MG: 25 TABLET, FILM COATED ORAL at 22:10

## 2024-10-16 RX ADMIN — FERROUS SULFATE TAB 325 MG (65 MG ELEMENTAL FE) 325 MG: 325 (65 FE) TAB at 22:09

## 2024-10-16 RX ADMIN — SODIUM CHLORIDE, PRESERVATIVE FREE 10 ML: 5 INJECTION INTRAVENOUS at 22:10

## 2024-10-16 RX ADMIN — SODIUM CHLORIDE: 9 INJECTION, SOLUTION INTRAVENOUS at 22:16

## 2024-10-16 ASSESSMENT — ENCOUNTER SYMPTOMS
EYE REDNESS: 0
SINUS PRESSURE: 0
SHORTNESS OF BREATH: 0
DIARRHEA: 0
RHINORRHEA: 0
EYE PAIN: 0
BACK PAIN: 0
CHEST TIGHTNESS: 0
WHEEZING: 0
EYE DISCHARGE: 0
VOMITING: 0
TROUBLE SWALLOWING: 0
COUGH: 1
COLOR CHANGE: 0
CONSTIPATION: 0
NAUSEA: 1
BLOOD IN STOOL: 0
ABDOMINAL PAIN: 0
FACIAL SWELLING: 0
SORE THROAT: 0

## 2024-10-16 ASSESSMENT — PAIN - FUNCTIONAL ASSESSMENT
PAIN_FUNCTIONAL_ASSESSMENT: ACTIVITIES ARE NOT PREVENTED
PAIN_FUNCTIONAL_ASSESSMENT: ACTIVITIES ARE NOT PREVENTED

## 2024-10-16 ASSESSMENT — PAIN SCALES - GENERAL
PAINLEVEL_OUTOF10: 2
PAINLEVEL_OUTOF10: 0
PAINLEVEL_OUTOF10: 3

## 2024-10-16 ASSESSMENT — PAIN DESCRIPTION - ORIENTATION
ORIENTATION: MID;RIGHT
ORIENTATION: MID;RIGHT

## 2024-10-16 ASSESSMENT — LIFESTYLE VARIABLES
HOW MANY STANDARD DRINKS CONTAINING ALCOHOL DO YOU HAVE ON A TYPICAL DAY: PATIENT DOES NOT DRINK
HOW OFTEN DO YOU HAVE A DRINK CONTAINING ALCOHOL: NEVER

## 2024-10-16 ASSESSMENT — PAIN DESCRIPTION - DESCRIPTORS
DESCRIPTORS: ACHING
DESCRIPTORS: ACHING

## 2024-10-16 ASSESSMENT — PAIN DESCRIPTION - PAIN TYPE
TYPE: ACUTE PAIN
TYPE: ACUTE PAIN

## 2024-10-16 ASSESSMENT — PAIN DESCRIPTION - ONSET
ONSET: ON-GOING
ONSET: ON-GOING

## 2024-10-16 ASSESSMENT — PAIN DESCRIPTION - LOCATION
LOCATION: BACK
LOCATION: BACK

## 2024-10-16 NOTE — H&P
generalized fatigue, malaise, nausea  No complaints of vomiting          Past Medical History:     Past Medical History:   Diagnosis Date    Depression     Headache     Hypothyroidism     MEN (multiple endocrine neoplasia) (HCC)         Past Surgical History:     Past Surgical History:   Procedure Laterality Date    THYROIDECTOMY      TONSILLECTOMY      TOTAL HIP ARTHROPLASTY Right         Medications Prior to Admission:     Prior to Admission medications    Medication Sig Start Date End Date Taking? Authorizing Provider   amitriptyline (ELAVIL) 25 MG tablet Take 1 tablet by mouth nightly   Yes Hamida Soni MD   vitamin D (CHOLECALCIFEROL) 125 MCG (5000 UT) CAPS capsule Take 1 capsule by mouth daily   Yes Hamida Soni MD   fludrocortisone (FLORINEF) 0.1 MG tablet Take 1 tablet by mouth daily   Yes Hamida Soni MD   FLUoxetine (PROZAC) 20 MG capsule Take 3 capsules by mouth daily   Yes Hamida Soni MD   OLANZapine (ZYPREXA) 5 MG tablet Take 1 tablet by mouth every 6 hours as needed (agitation)   Yes Hamida Soni MD   folic acid (FOLVITE) 1 MG tablet Take 1 tablet by mouth daily   Yes Hamida Soni MD   hydrocortisone (CORTEF) 10 MG tablet Take 20 mg (two tablets) in the morning and 15 mg (one and a half tablets) in the evening   Yes Hamida Soni MD   ferrous sulfate (IRON 325) 325 (65 Fe) MG tablet Take 1 tablet by mouth in the morning and at bedtime   Yes Hamida Soni MD   vitamin B-12 (CYANOCOBALAMIN) 1000 MCG tablet Take 1 tablet by mouth daily   Yes Hamida Soni MD   escitalopram (LEXAPRO) 10 MG tablet Take 1 tablet by mouth daily  Patient not taking: Reported on 10/16/2024 8/14/19   Amanda Hung APRN - NP   traZODone (DESYREL) 50 MG tablet Take 1 tablet by mouth nightly as needed for Sleep 8/13/19   Amanda Hung APRN - NP   paliperidone (INVEGA) 3 MG extended release tablet Take 1 tablet by mouth daily  Patient not

## 2024-10-16 NOTE — ED PROVIDER NOTES
eMERGENCY dEPARTMENT eNCOUnter      Pt Name: Yaritza Esquivel  MRN: [id number]  Birthdate 1975  Date of evaluation: 10/16/24      CHIEF COMPLAINT       Chief Complaint   Patient presents with    Nausea     Pt states she has not had her hydrocortisone or levothyroxine in over 1 month and states she is  she is in adrenal insufficiency. Pt is coming from [facility]. Pt is there for depression. Pt currently denies SI    Fatigue    Generalized Body Aches         HISTORY OF PRESENT ILLNESS    Yaritza Esquivel is a 49 y.o. female who presents complaining of fatigue.  Patient states for the last week she has been feeling very fatigued and nauseous slight cough no energy.  Patient has been without her thyroid and hydrocortisone for the last month.  Patient just recently moved here and does not have a doctor yet.  Patient states she thinks that she is in adrenal crisis because of this.  Patient is had no diarrhea no difficulty urinating no chest pain no palpitations.      REVIEW OF SYSTEMS       Review of Systems   Constitutional:  Positive for fatigue. Negative for activity change, appetite change, chills, diaphoresis and fever.   HENT:  Negative for congestion, ear pain, facial swelling, nosebleeds, rhinorrhea, sinus pressure, sore throat and trouble swallowing.    Eyes:  Negative for pain, discharge and redness.   Respiratory:  Positive for cough. Negative for chest tightness, shortness of breath and wheezing.    Cardiovascular:  Negative for chest pain, palpitations and leg swelling.   Gastrointestinal:  Positive for nausea. Negative for abdominal pain, blood in stool, constipation, diarrhea and vomiting.   Genitourinary:  Negative for difficulty urinating, dysuria, flank pain, frequency, genital sores and hematuria.   Musculoskeletal:  Negative for arthralgias, back pain, gait problem, joint swelling, myalgias and neck pain.   Skin:  Negative for color change, pallor, rash and wound.   Neurological:  Negative for dizziness,

## 2024-10-16 NOTE — ED NOTES
Pt is a 49 year old female who presents to ed for nausea, fatigue and depression. Pt states is currently admitted to [facility] and unhappy with services due to \"lack of structure\". Pt states since being admitted she feels her depression is becoming worse and would like in patient Psychiatry. Pt states was beneficial to her with last stay. Pt was last admitted 8/8/19.

## 2024-10-16 NOTE — ED NOTES
Report given to NIRANJAN Bain from [facility].   Report method by phone   The following was reviewed with receiving RN:   Current vital signs:  /75   Pulse 73   Temp 98.3 °F (36.8 °C) (Oral)   Resp 17   Ht 1.905 m (6' 3\")   Wt 81.6 kg (180 lb)   SpO2 96%   BMI 22.50 kg/m²                      Any medication or safety alerts were reviewed. Any pending diagnostics and notifications were also reviewed, as well as any safety concerns or issues, abnormal labs, abnormal imaging, and abnormal assessment findings. Questions were answered.

## 2024-10-17 ENCOUNTER — HOSPITAL ENCOUNTER (INPATIENT)
Age: 49
LOS: 6 days | Discharge: HOME | DRG: 751 | End: 2024-10-23
Attending: PSYCHIATRY & NEUROLOGY | Admitting: PSYCHIATRY & NEUROLOGY
Payer: COMMERCIAL

## 2024-10-17 VITALS
HEIGHT: 72 IN | HEART RATE: 79 BPM | DIASTOLIC BLOOD PRESSURE: 71 MMHG | OXYGEN SATURATION: 93 % | SYSTOLIC BLOOD PRESSURE: 114 MMHG | BODY MASS INDEX: 27.95 KG/M2 | RESPIRATION RATE: 16 BRPM | TEMPERATURE: 98.8 F | WEIGHT: 206.35 LBS

## 2024-10-17 PROBLEM — F32.A DEPRESSION WITH SUICIDAL IDEATION: Status: ACTIVE | Noted: 2024-10-17

## 2024-10-17 PROBLEM — F33.2 SEVERE RECURRENT MAJOR DEPRESSION WITHOUT PSYCHOTIC FEATURES (HCC): Status: ACTIVE | Noted: 2024-10-17

## 2024-10-17 PROBLEM — R45.851 DEPRESSION WITH SUICIDAL IDEATION: Status: ACTIVE | Noted: 2024-10-17

## 2024-10-17 LAB
ANION GAP SERPL CALCULATED.3IONS-SCNC: 8 MMOL/L (ref 9–16)
BUN SERPL-MCNC: 15 MG/DL (ref 6–20)
CALCIUM SERPL-MCNC: 9.8 MG/DL (ref 8.6–10.4)
CHLORIDE SERPL-SCNC: 106 MMOL/L (ref 98–107)
CO2 SERPL-SCNC: 23 MMOL/L (ref 20–31)
CREAT SERPL-MCNC: 0.8 MG/DL (ref 0.7–1.2)
GFR, ESTIMATED: >90 ML/MIN/1.73M2
GLUCOSE BLD-MCNC: 115 MG/DL (ref 65–105)
GLUCOSE SERPL-MCNC: 132 MG/DL (ref 74–99)
POTASSIUM SERPL-SCNC: 4.6 MMOL/L (ref 3.7–5.3)
SODIUM SERPL-SCNC: 137 MMOL/L (ref 136–145)

## 2024-10-17 PROCEDURE — 83036 HEMOGLOBIN GLYCOSYLATED A1C: CPT

## 2024-10-17 PROCEDURE — 99239 HOSP IP/OBS DSCHRG MGMT >30: CPT | Performed by: INTERNAL MEDICINE

## 2024-10-17 PROCEDURE — 90792 PSYCH DIAG EVAL W/MED SRVCS: CPT | Performed by: PSYCHIATRY & NEUROLOGY

## 2024-10-17 PROCEDURE — 80061 LIPID PANEL: CPT

## 2024-10-17 PROCEDURE — 1240000000 HC EMOTIONAL WELLNESS R&B

## 2024-10-17 PROCEDURE — 82947 ASSAY GLUCOSE BLOOD QUANT: CPT

## 2024-10-17 PROCEDURE — 36415 COLL VENOUS BLD VENIPUNCTURE: CPT

## 2024-10-17 PROCEDURE — 84439 ASSAY OF FREE THYROXINE: CPT

## 2024-10-17 PROCEDURE — 6370000000 HC RX 637 (ALT 250 FOR IP): Performed by: INTERNAL MEDICINE

## 2024-10-17 PROCEDURE — 84443 ASSAY THYROID STIM HORMONE: CPT

## 2024-10-17 PROCEDURE — 80048 BASIC METABOLIC PNL TOTAL CA: CPT

## 2024-10-17 PROCEDURE — 6360000002 HC RX W HCPCS: Performed by: INTERNAL MEDICINE

## 2024-10-17 PROCEDURE — 97165 OT EVAL LOW COMPLEX 30 MIN: CPT

## 2024-10-17 PROCEDURE — 2580000003 HC RX 258: Performed by: INTERNAL MEDICINE

## 2024-10-17 RX ORDER — HYDROCORTISONE 10 MG/1
20 TABLET ORAL DAILY
Status: DISCONTINUED | OUTPATIENT
Start: 2024-10-17 | End: 2024-10-17 | Stop reason: HOSPADM

## 2024-10-17 RX ORDER — POLYETHYLENE GLYCOL 3350 17 G/17G
17 POWDER, FOR SOLUTION ORAL DAILY PRN
Status: DISCONTINUED | OUTPATIENT
Start: 2024-10-17 | End: 2024-10-23 | Stop reason: HOSPADM

## 2024-10-17 RX ORDER — BUTALBITAL, ASPIRIN, AND CAFFEINE 325; 50; 40 MG/1; MG/1; MG/1
1 CAPSULE ORAL EVERY 6 HOURS PRN
Status: DISCONTINUED | OUTPATIENT
Start: 2024-10-17 | End: 2024-10-17 | Stop reason: HOSPADM

## 2024-10-17 RX ORDER — BUTALBITAL, ASPIRIN, AND CAFFEINE 325; 50; 40 MG/1; MG/1; MG/1
1 CAPSULE ORAL EVERY 6 HOURS PRN
Status: CANCELLED | OUTPATIENT
Start: 2024-10-17

## 2024-10-17 RX ORDER — HYDROCORTISONE 10 MG/1
15 TABLET ORAL EVERY EVENING
Status: CANCELLED | OUTPATIENT
Start: 2024-10-17

## 2024-10-17 RX ORDER — FLUDROCORTISONE ACETATE 0.1 MG/1
0.1 TABLET ORAL DAILY
Status: DISCONTINUED | OUTPATIENT
Start: 2024-10-17 | End: 2024-10-17 | Stop reason: HOSPADM

## 2024-10-17 RX ORDER — PANTOPRAZOLE SODIUM 40 MG/1
40 TABLET, DELAYED RELEASE ORAL
Status: CANCELLED | OUTPATIENT
Start: 2024-10-18

## 2024-10-17 RX ORDER — ACETAMINOPHEN 325 MG/1
650 TABLET ORAL EVERY 4 HOURS PRN
Status: DISCONTINUED | OUTPATIENT
Start: 2024-10-17 | End: 2024-10-17

## 2024-10-17 RX ORDER — TRAZODONE HYDROCHLORIDE 50 MG/1
50 TABLET, FILM COATED ORAL NIGHTLY PRN
Status: DISCONTINUED | OUTPATIENT
Start: 2024-10-17 | End: 2024-10-20

## 2024-10-17 RX ORDER — HYDROCORTISONE 10 MG/1
20 TABLET ORAL DAILY
Status: CANCELLED | OUTPATIENT
Start: 2024-10-18

## 2024-10-17 RX ORDER — TRAZODONE HYDROCHLORIDE 50 MG/1
50 TABLET, FILM COATED ORAL NIGHTLY PRN
Status: CANCELLED | OUTPATIENT
Start: 2024-10-17

## 2024-10-17 RX ORDER — HYDROXYZINE HYDROCHLORIDE 50 MG/1
50 TABLET, FILM COATED ORAL 3 TIMES DAILY PRN
Status: DISCONTINUED | OUTPATIENT
Start: 2024-10-17 | End: 2024-10-23 | Stop reason: HOSPADM

## 2024-10-17 RX ORDER — FERROUS SULFATE 325(65) MG
325 TABLET ORAL 2 TIMES DAILY
Status: CANCELLED | OUTPATIENT
Start: 2024-10-17

## 2024-10-17 RX ORDER — HYDROCORTISONE 10 MG/1
15 TABLET ORAL EVERY EVENING
Status: DISCONTINUED | OUTPATIENT
Start: 2024-10-17 | End: 2024-10-17 | Stop reason: HOSPADM

## 2024-10-17 RX ORDER — POLYETHYLENE GLYCOL 3350 17 G
2 POWDER IN PACKET (EA) ORAL
Status: DISCONTINUED | OUTPATIENT
Start: 2024-10-17 | End: 2024-10-23 | Stop reason: HOSPADM

## 2024-10-17 RX ORDER — FOLIC ACID 1 MG/1
1 TABLET ORAL DAILY
Status: CANCELLED | OUTPATIENT
Start: 2024-10-18

## 2024-10-17 RX ORDER — HALOPERIDOL 5 MG/ML
5 INJECTION INTRAMUSCULAR EVERY 6 HOURS PRN
Status: DISCONTINUED | OUTPATIENT
Start: 2024-10-17 | End: 2024-10-23 | Stop reason: HOSPADM

## 2024-10-17 RX ORDER — OLANZAPINE 10 MG/1
5 TABLET ORAL EVERY 6 HOURS PRN
Status: CANCELLED | OUTPATIENT
Start: 2024-10-17

## 2024-10-17 RX ORDER — LANOLIN ALCOHOL/MO/W.PET/CERES
1000 CREAM (GRAM) TOPICAL DAILY
Status: CANCELLED | OUTPATIENT
Start: 2024-10-18

## 2024-10-17 RX ORDER — DIPHENHYDRAMINE HYDROCHLORIDE 50 MG/ML
50 INJECTION INTRAMUSCULAR; INTRAVENOUS EVERY 6 HOURS PRN
Status: DISCONTINUED | OUTPATIENT
Start: 2024-10-17 | End: 2024-10-23 | Stop reason: HOSPADM

## 2024-10-17 RX ORDER — HALOPERIDOL 5 MG/1
5 TABLET ORAL EVERY 6 HOURS PRN
Status: DISCONTINUED | OUTPATIENT
Start: 2024-10-17 | End: 2024-10-23 | Stop reason: HOSPADM

## 2024-10-17 RX ORDER — MAGNESIUM HYDROXIDE/ALUMINUM HYDROXICE/SIMETHICONE 120; 1200; 1200 MG/30ML; MG/30ML; MG/30ML
30 SUSPENSION ORAL EVERY 6 HOURS PRN
Status: DISCONTINUED | OUTPATIENT
Start: 2024-10-17 | End: 2024-10-23 | Stop reason: HOSPADM

## 2024-10-17 RX ORDER — ACETAMINOPHEN 325 MG/1
650 TABLET ORAL EVERY 6 HOURS PRN
Status: DISCONTINUED | OUTPATIENT
Start: 2024-10-17 | End: 2024-10-17

## 2024-10-17 RX ORDER — FLUDROCORTISONE ACETATE 0.1 MG/1
0.1 TABLET ORAL DAILY
Status: CANCELLED | OUTPATIENT
Start: 2024-10-18

## 2024-10-17 RX ORDER — POLYETHYLENE GLYCOL 3350 17 G/17G
17 POWDER, FOR SOLUTION ORAL DAILY PRN
Status: CANCELLED | OUTPATIENT
Start: 2024-10-17

## 2024-10-17 RX ORDER — LEVOTHYROXINE SODIUM 75 UG/1
150 TABLET ORAL DAILY
Status: CANCELLED | OUTPATIENT
Start: 2024-10-18

## 2024-10-17 RX ADMIN — ENOXAPARIN SODIUM 40 MG: 100 INJECTION SUBCUTANEOUS at 08:10

## 2024-10-17 RX ADMIN — FLUDROCORTISONE ACETATE 0.1 MG: 0.1 TABLET ORAL at 15:05

## 2024-10-17 RX ADMIN — FERROUS SULFATE TAB 325 MG (65 MG ELEMENTAL FE) 325 MG: 325 (65 FE) TAB at 08:11

## 2024-10-17 RX ADMIN — SODIUM CHLORIDE: 9 INJECTION, SOLUTION INTRAVENOUS at 08:16

## 2024-10-17 RX ADMIN — LEVOTHYROXINE SODIUM 150 MCG: 0.07 TABLET ORAL at 09:25

## 2024-10-17 RX ADMIN — CHOLECALCIFEROL TAB 125 MCG (5000 UNIT) 5000 UNITS: 125 TAB at 08:11

## 2024-10-17 RX ADMIN — BUTALBITAL, ASPIRIN, AND CAFFEINE 1 CAPSULE: 50; 325; 40 CAPSULE ORAL at 15:21

## 2024-10-17 RX ADMIN — FOLIC ACID 1 MG: 1 TABLET ORAL at 08:11

## 2024-10-17 RX ADMIN — HYDROCORTISONE SODIUM SUCCINATE 100 MG: 100 INJECTION, POWDER, FOR SOLUTION INTRAMUSCULAR; INTRAVENOUS at 05:02

## 2024-10-17 RX ADMIN — HYDROCORTISONE 15 MG: 10 TABLET ORAL at 19:44

## 2024-10-17 RX ADMIN — PANTOPRAZOLE SODIUM 40 MG: 40 TABLET, DELAYED RELEASE ORAL at 06:15

## 2024-10-17 RX ADMIN — AMITRIPTYLINE HYDROCHLORIDE 25 MG: 25 TABLET, FILM COATED ORAL at 19:50

## 2024-10-17 RX ADMIN — CYANOCOBALAMIN TAB 1000 MCG 1000 MCG: 1000 TAB at 08:11

## 2024-10-17 RX ADMIN — FLUOXETINE HYDROCHLORIDE 60 MG: 20 CAPSULE ORAL at 08:11

## 2024-10-17 RX ADMIN — HYDROCORTISONE SODIUM SUCCINATE 100 MG: 100 INJECTION, POWDER, FOR SOLUTION INTRAMUSCULAR; INTRAVENOUS at 13:34

## 2024-10-17 RX ADMIN — FERROUS SULFATE TAB 325 MG (65 MG ELEMENTAL FE) 325 MG: 325 (65 FE) TAB at 19:43

## 2024-10-17 ASSESSMENT — PAIN DESCRIPTION - PAIN TYPE: TYPE: ACUTE PAIN

## 2024-10-17 ASSESSMENT — PAIN DESCRIPTION - ONSET: ONSET: GRADUAL

## 2024-10-17 ASSESSMENT — PAIN DESCRIPTION - FREQUENCY: FREQUENCY: INTERMITTENT

## 2024-10-17 ASSESSMENT — PATIENT HEALTH QUESTIONNAIRE - PHQ9
8. MOVING OR SPEAKING SO SLOWLY THAT OTHER PEOPLE COULD HAVE NOTICED. OR THE OPPOSITE, BEING SO FIGETY OR RESTLESS THAT YOU HAVE BEEN MOVING AROUND A LOT MORE THAN USUAL: NOT AT ALL
1. LITTLE INTEREST OR PLEASURE IN DOING THINGS: MORE THAN HALF THE DAYS
4. FEELING TIRED OR HAVING LITTLE ENERGY: SEVERAL DAYS
7. TROUBLE CONCENTRATING ON THINGS, SUCH AS READING THE NEWSPAPER OR WATCHING TELEVISION: NOT AT ALL
3. TROUBLE FALLING OR STAYING ASLEEP: SEVERAL DAYS
SUM OF ALL RESPONSES TO PHQ QUESTIONS 1-9: 12
6. FEELING BAD ABOUT YOURSELF - OR THAT YOU ARE A FAILURE OR HAVE LET YOURSELF OR YOUR FAMILY DOWN: MORE THAN HALF THE DAYS
2. FEELING DOWN, DEPRESSED OR HOPELESS: MORE THAN HALF THE DAYS
10. IF YOU CHECKED OFF ANY PROBLEMS, HOW DIFFICULT HAVE THESE PROBLEMS MADE IT FOR YOU TO DO YOUR WORK, TAKE CARE OF THINGS AT HOME, OR GET ALONG WITH OTHER PEOPLE: SOMEWHAT DIFFICULT
SUM OF ALL RESPONSES TO PHQ QUESTIONS 1-9: 10
SUM OF ALL RESPONSES TO PHQ QUESTIONS 1-9: 12
SUM OF ALL RESPONSES TO PHQ QUESTIONS 1-9: 12
SUM OF ALL RESPONSES TO PHQ9 QUESTIONS 1 & 2: 4
5. POOR APPETITE OR OVEREATING: MORE THAN HALF THE DAYS
9. THOUGHTS THAT YOU WOULD BE BETTER OFF DEAD, OR OF HURTING YOURSELF: MORE THAN HALF THE DAYS

## 2024-10-17 ASSESSMENT — PAIN SCALES - GENERAL
PAINLEVEL_OUTOF10: 3
PAINLEVEL_OUTOF10: 0

## 2024-10-17 ASSESSMENT — SLEEP AND FATIGUE QUESTIONNAIRES
SLEEP PATTERN: DIFFICULTY FALLING ASLEEP;DISTURBED/INTERRUPTED SLEEP
DO YOU HAVE DIFFICULTY SLEEPING: NO
DO YOU USE A SLEEP AID: YES
AVERAGE NUMBER OF SLEEP HOURS: 8

## 2024-10-17 ASSESSMENT — PAIN - FUNCTIONAL ASSESSMENT: PAIN_FUNCTIONAL_ASSESSMENT: ACTIVITIES ARE NOT PREVENTED

## 2024-10-17 ASSESSMENT — PAIN DESCRIPTION - ORIENTATION: ORIENTATION: RIGHT;LEFT

## 2024-10-17 ASSESSMENT — PAIN DESCRIPTION - DESCRIPTORS: DESCRIPTORS: ACHING

## 2024-10-17 ASSESSMENT — PAIN DESCRIPTION - LOCATION: LOCATION: HEAD

## 2024-10-17 NOTE — FLOWSHEET NOTE
10/16/24 2256   Treatment Team Notification   Reason for Communication Evaluate   Name of Team Member Notified Dr. Cruz   Treatment Team Role Consulting Provider   Method of Communication Secure Message   Response Waiting for response     Dr Anthony castillo for new consult for depression.

## 2024-10-17 NOTE — CONSULTS
Social History     Socioeconomic History    Marital status: Single     Spouse name: Not on file    Number of children: Not on file    Years of education: Not on file    Highest education level: Not on file   Occupational History    Not on file   Tobacco Use    Smoking status: Some Days     Current packs/day: 0.50     Average packs/day: 0.5 packs/day for 28.0 years (14.0 ttl pk-yrs)     Types: Cigarettes    Smokeless tobacco: Never   Vaping Use    Vaping status: Every Day   Substance and Sexual Activity    Alcohol use: Not Currently     Comment: Sober since 2010    Drug use: Not Currently     Types: Cocaine, Marijuana (Weed)     Comment: Sober since October 2018    Sexual activity: Not Currently   Other Topics Concern    Not on file   Social History Narrative    Not on file     Social Determinants of Health     Financial Resource Strain: Low Risk  (8/8/2022)    Received from [employer]    Overall Financial Resource Strain (CARDIA)     Difficulty of Paying Living Expenses: Not very hard   Food Insecurity: Food Insecurity Present (10/16/2024)    Hunger Vital Sign     Worried About Running Out of Food in the Last Year: Sometimes true     Ran Out of Food in the Last Year: Sometimes true   Transportation Needs: No Transportation Needs (10/16/2024)    PRAPARE - Transportation     Lack of Transportation (Medical): No     Lack of Transportation (Non-Medical): No   Physical Activity: Inactive (8/8/2022)    Received from [employer]    Exercise Vital Sign     Days of Exercise per Week: 0 days     Minutes of Exercise per Session: 10 min   Stress: Stress Concern Present (8/8/2022)    Received from [employer]    [employer] [employer] - Occupational Stress Questionnaire     Feeling of Stress : To some extent   Social Connections: Somewhat Isolated (10/17/2024)    Social Connections ([facility] HRSN)     If for any reason you need help with day-to-day activities such as bathing, preparing meals, shopping, managing
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
## 2024-10-17 NOTE — PLAN OF CARE
Problem: Safety - Adult  Goal: Free from fall injury  10/17/2024 0358 by Vinicius Munoz RN  Outcome: Progressing  Note: No falls noted this shift. Patient ambulates without difficulty.  Bed kept in low position. Safe environment maintained. Bedside table & call light in reach. Uses call light appropriately.     Problem: Discharge Planning  Goal: Discharge to home or other facility with appropriate resources  Outcome: Not Progressing    Problem: Self Harm/Suicidality  Goal: Will have no self-injury during hospital stay  Outcome: Progressing  Note: Maintained a safe environment. No self injury during the shift and with 1:1 .

## 2024-10-18 ENCOUNTER — APPOINTMENT (OUTPATIENT)
Dept: GENERAL RADIOLOGY | Age: 49
DRG: 751 | End: 2024-10-18
Attending: PSYCHIATRY & NEUROLOGY
Payer: COMMERCIAL

## 2024-10-18 PROCEDURE — APPSS60 APP SPLIT SHARED TIME 46-60 MINUTES

## 2024-10-18 PROCEDURE — 93005 ELECTROCARDIOGRAM TRACING: CPT

## 2024-10-18 PROCEDURE — 72100 X-RAY EXAM L-S SPINE 2/3 VWS: CPT

## 2024-10-18 PROCEDURE — 1240000000 HC EMOTIONAL WELLNESS R&B

## 2024-10-18 PROCEDURE — 6370000000 HC RX 637 (ALT 250 FOR IP): Performed by: INTERNAL MEDICINE

## 2024-10-18 PROCEDURE — 99222 1ST HOSP IP/OBS MODERATE 55: CPT | Performed by: INTERNAL MEDICINE

## 2024-10-18 PROCEDURE — 99223 1ST HOSP IP/OBS HIGH 75: CPT | Performed by: PSYCHIATRY & NEUROLOGY

## 2024-10-18 RX ORDER — OLANZAPINE 5 MG/1
5 TABLET ORAL EVERY 6 HOURS PRN
Status: DISCONTINUED | OUTPATIENT
Start: 2024-10-18 | End: 2024-10-23 | Stop reason: HOSPADM

## 2024-10-18 RX ORDER — FERROUS SULFATE 325(65) MG
325 TABLET ORAL 2 TIMES DAILY WITH MEALS
Status: DISCONTINUED | OUTPATIENT
Start: 2024-10-18 | End: 2024-10-23 | Stop reason: HOSPADM

## 2024-10-18 RX ORDER — POLYETHYLENE GLYCOL 3350 17 G/17G
17 POWDER, FOR SOLUTION ORAL DAILY PRN
Status: DISCONTINUED | OUTPATIENT
Start: 2024-10-18 | End: 2024-10-18 | Stop reason: SDUPTHER

## 2024-10-18 RX ORDER — TRAZODONE HYDROCHLORIDE 50 MG/1
50 TABLET, FILM COATED ORAL NIGHTLY PRN
Status: DISCONTINUED | OUTPATIENT
Start: 2024-10-18 | End: 2024-10-18 | Stop reason: SDUPTHER

## 2024-10-18 RX ORDER — HYDROCORTISONE 10 MG/1
15 TABLET ORAL EVERY EVENING
Status: DISCONTINUED | OUTPATIENT
Start: 2024-10-18 | End: 2024-10-18

## 2024-10-18 RX ORDER — FLUDROCORTISONE ACETATE 0.1 MG/1
0.1 TABLET ORAL DAILY
Status: DISCONTINUED | OUTPATIENT
Start: 2024-10-18 | End: 2024-10-23 | Stop reason: HOSPADM

## 2024-10-18 RX ORDER — LEVOTHYROXINE SODIUM 75 UG/1
150 TABLET ORAL DAILY
Status: DISCONTINUED | OUTPATIENT
Start: 2024-10-18 | End: 2024-10-23 | Stop reason: HOSPADM

## 2024-10-18 RX ORDER — PANTOPRAZOLE SODIUM 40 MG/1
40 TABLET, DELAYED RELEASE ORAL
Status: DISCONTINUED | OUTPATIENT
Start: 2024-10-18 | End: 2024-10-23 | Stop reason: HOSPADM

## 2024-10-18 RX ORDER — HYDROCORTISONE 10 MG/1
15 TABLET ORAL EVERY EVENING
Status: DISCONTINUED | OUTPATIENT
Start: 2024-10-19 | End: 2024-10-23 | Stop reason: HOSPADM

## 2024-10-18 RX ORDER — LANOLIN ALCOHOL/MO/W.PET/CERES
1000 CREAM (GRAM) TOPICAL DAILY
Status: DISCONTINUED | OUTPATIENT
Start: 2024-10-18 | End: 2024-10-23 | Stop reason: HOSPADM

## 2024-10-18 RX ORDER — HYDROCORTISONE 10 MG/1
20 TABLET ORAL DAILY
Status: DISCONTINUED | OUTPATIENT
Start: 2024-10-18 | End: 2024-10-23 | Stop reason: HOSPADM

## 2024-10-18 RX ORDER — BUTALBITAL, ASPIRIN, AND CAFFEINE 325; 50; 40 MG/1; MG/1; MG/1
1 CAPSULE ORAL EVERY 6 HOURS PRN
Status: DISCONTINUED | OUTPATIENT
Start: 2024-10-18 | End: 2024-10-23 | Stop reason: HOSPADM

## 2024-10-18 RX ORDER — FOLIC ACID 1 MG/1
1 TABLET ORAL DAILY
Status: DISCONTINUED | OUTPATIENT
Start: 2024-10-18 | End: 2024-10-23 | Stop reason: HOSPADM

## 2024-10-18 RX ORDER — LIDOCAINE 4 G/G
1 PATCH TOPICAL DAILY
Status: DISCONTINUED | OUTPATIENT
Start: 2024-10-18 | End: 2024-10-23 | Stop reason: HOSPADM

## 2024-10-18 RX ADMIN — CHOLECALCIFEROL TAB 125 MCG (5000 UNIT) 5000 UNITS: 125 TAB at 16:23

## 2024-10-18 RX ADMIN — PANTOPRAZOLE SODIUM 40 MG: 40 TABLET, DELAYED RELEASE ORAL at 15:22

## 2024-10-18 RX ADMIN — FLUOXETINE HYDROCHLORIDE 60 MG: 20 CAPSULE ORAL at 15:18

## 2024-10-18 RX ADMIN — FOLIC ACID 1 MG: 1 TABLET ORAL at 15:18

## 2024-10-18 RX ADMIN — HYDROCORTISONE 20 MG: 10 TABLET ORAL at 16:23

## 2024-10-18 RX ADMIN — CYANOCOBALAMIN TAB 1000 MCG 1000 MCG: 1000 TAB at 16:23

## 2024-10-18 RX ADMIN — AMITRIPTYLINE HYDROCHLORIDE 25 MG: 25 TABLET, FILM COATED ORAL at 21:51

## 2024-10-18 RX ADMIN — FLUDROCORTISONE ACETATE 0.1 MG: 0.1 TABLET ORAL at 16:23

## 2024-10-18 RX ADMIN — LEVOTHYROXINE SODIUM 150 MCG: 0.07 TABLET ORAL at 15:18

## 2024-10-18 RX ADMIN — FERROUS SULFATE TAB 325 MG (65 MG ELEMENTAL FE) 325 MG: 325 (65 FE) TAB at 15:18

## 2024-10-18 ASSESSMENT — PAIN SCALES - GENERAL: PAINLEVEL_OUTOF10: 3

## 2024-10-18 NOTE — GROUP NOTE
Group Therapy Note    Date: 10/18/2024    Group Start Time: 1100  Group End Time: 1135  Group Topic: Recreational    [facility] Faiza Chavez CTRS        Group Therapy Note    Attendees: 7/14    Recreation Group Note        Date: October 18, 2024 Start Time: 11am  End Time: 11:35am      Number of Participants in Group & Unit Census:  7/14    Topic:  interpersonal skills, leisure awareness, concentration     Goal of Group: To improve interpersonal skills and leisure awareness through collaborating with peers and concentrating on a presented task.       Comments:     Patient did not participate in Recreation group, despite staff encouragement and explanation of benefits.  Patient remain seclusive to self.  Q15 minute safety checks maintained for patient safety and will continue to encourage patient to attend unit programming.        Signature:  LEV Mares

## 2024-10-18 NOTE — PLAN OF CARE
Problem: Self Harm/Suicidality  Goal: Will have no self-injury during hospital stay  Description: INTERVENTIONS:  1.  Ensure constant observer at bedside with Q15M safety checks  2.  Maintain a safe environment  3.  Secure patient belongings  4.  Ensure family/visitors adhere to safety recommendations  5.  Ensure safety tray has been added to patient's diet order  6.  Every shift and PRN: Re-assess suicidal risk via Frequent Screener  Outcome: Progressing     Problem: Depression  Goal: Will be euthymic at discharge  Description: INTERVENTIONS:  1. Administer medication as ordered  2. Provide emotional support via 1:1 interaction with staff  3. Encourage involvement in milieu/groups/activities  4. Monitor for social isolation  Outcome: Progressing     Problem: ABCDS Injury Assessment  Goal: Absence of physical injury  Outcome: Progressing     Problem: Anxiety  Goal: Will report anxiety at manageable levels  Description: INTERVENTIONS:  1. Administer medication as ordered  2. Teach and rehearse alternative coping skills  3. Provide emotional support with 1:1 interaction with staff  Outcome: Progressing     Problem: Sleep Disturbance  Goal: Will exhibit normal sleeping pattern  Description: INTERVENTIONS:  1. Administer medication as ordered  2. Decrease environmental stimuli, including noise, as appropriate  3. Discourage social isolation and naps during the day  Outcome: Progressing     Patient is open to 1:1 talk time with staff. Patient reports experiencing depression at a level of 8.5 out of ten as well as anxiety at a level of nine out of ten today. Patient also reports the presence of suicidal ideations, patient reported having a plan but did not specify. She reported that the plan would only be doable if she were to be discharged and she contracts for safety while on the unit. Although her mood is still very low, she reports a small improvement since her admission last night. Patient is mostly isolative to her

## 2024-10-18 NOTE — GROUP NOTE
Group Therapy Note    Date: 10/18/2024    Group Start Time: 1330  Group End Time: 1415  Group Topic: Cognitive Skills    Faiza Amaro CTRS        Group Therapy Note    Attendees: 6/14    Cognitive Skills Group Note        Date: October 18, 2024 Start Time: 1:30pm  End Time: 2:15pm      Number of Participants in Group & Unit Census:  6/14    Topic:  interpersonal skills, memory recall, self-expression     Goal of Group: To improve interpersonal skills and memory recall through collaborating with peers and demonstrating self-expression.      Comments:     Patient did not participate in Cognitive Skills group, despite staff encouragement and explanation of benefits.  Patient remain seclusive to self.  Q15 minute safety checks maintained for patient safety and will continue to encourage patient to attend unit programming.        Signature:  LEV Mares

## 2024-10-18 NOTE — H&P
Department of Psychiatry  Attending Physician Psychiatric Assessment     Reason for Admission to Psychiatric Unit:  Threat to self requiring 24 hour professional observation  A mental disorder causing major disability in social, interpersonal, occupational, and/or educational functioning that is leading to dangerous or life-threatening functioning, and that can only be addressed in an acute inpatient setting   Failure of outpatient psychiatry treatment so that the beneficiary requires 24 hour professional observation and care  Concerns about patient's safety in the community  Past Mental Health Diagnosis: a history of  Major Depression and Alcohol and/or Drug Use Disorder  Triggering event or precipitating factor: Housing instability, Financial instability, Relationship Issues, and Psych Treatment Noncompliance  Length of time/duration of triggering event: Weeks  Legal Status: Voluntary    CHIEF COMPLAINT: Depression with suicidal ideation    History obtained from: Patient, electronic medical record          HISTORY OF PRESENT ILLNESS:    Yaritza Esquivel is a 49 y.o. female who has a past medical history of seizures, COPD, hypothyroidism, CVA, parathyroidectomy, bilateral adrenalectomy with pheochromocytoma, and mental illness. Patient presented to [facility] ED endorsing nausea, fatigue, body aches. Patient has been without her thyroid medication for the past month and believes she is in adrenal crisis. She recently moved to [facility] and has not yet found a doctor. Patient has been feeling more depressed and anxious since she has not been taking medication. She endorsed  suicidal plan to jump off into traffic.  Patient has been linked with services in the past and trialed several medications.  Patient is not currently receiving care for psychiatric illness.    Patient is in bed awake.  She agrees to conduct initial assessment and private room with door open.  She describes her mood as \"depressed\".  Reports her 
MEN syndrome    Stroke Brother         MEN syndrome    Depression Brother         Committed suicide    Hypertension Maternal Grandfather     Hypertension Paternal Grandfather        Review of Systems:     Positive and Negative as described in HPI.    CONSTITUTIONAL:  negative for fevers, chills, sweats, fatigue, weight loss  HEENT:  negative for vision, hearing changes, runny nose, throat pain  RESPIRATORY:  negative for shortness of breath, cough, congestion, wheezing.  CARDIOVASCULAR:  negative for chest pain, palpitations.  GASTROINTESTINAL:  negative for nausea, vomiting, diarrhea, constipation, change in bowel habits, abdominal pain   GENITOURINARY:  negative for difficulty of urination, burning with urination, frequency   INTEGUMENT:  negative for rash, skin lesions, easy bruising   HEMATOLOGIC/LYMPHATIC:  negative for swelling/edema   ALLERGIC/IMMUNOLOGIC:  negative for urticaria , itching  ENDOCRINE:  negative increase in drinking, increase in urination, hot or cold intolerance  MUSCULOSKELETAL:  negative joint pains, muscle aches, swelling of joints  NEUROLOGICAL:  negative for headaches, dizziness, lightheadedness, numbness, pain, tingling extremities      Physical Exam:     /74   Pulse 64   Temp 98 °F (36.7 °C) (Temporal)   Resp 14   Ht 1.905 m (6' 3\")   Wt 84.8 kg (187 lb)   BMI 23.37 kg/m²   Temp (24hrs), Av.4 °F (36.9 °C), Min:98 °F (36.7 °C), Max:98.8 °F (37.1 °C)    Recent Labs     10/17/24  2030   POCGLU 115*     No intake or output data in the 24 hours ending 10/18/24 1451    General Appearance:  alert, well appearing, and in no acute distress  Mental status: oriented to person, place, and time   Head:  normocephalic, atraumatic.  Neck: supple, no carotid bruits, thyroid not palpable  Lungs: Bilateral equal air entry, clear to ausculation, no wheezing, rales or rhonchi, normal effort  Cardiovascular: normal rate, regular rhythm, no murmur, gallop, rub.  Abdomen: Soft, nontender,

## 2024-10-18 NOTE — GROUP NOTE
Group Therapy Note    Date: 10/18/2024    Group Start Time: 1010  Group End Time: 1045  Group Topic: Psychoeducation    Kendy Duke, DELLW        Group Therapy Note    Attendees: 6/14       patient refused to attend psychoeducation group at 1010a after encouragement from staff.  1:1 talk time provided as alternative to group session
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
## 2024-10-19 PROBLEM — M41.9 SCOLIOSIS OF LUMBAR SPINE: Status: ACTIVE | Noted: 2024-10-19

## 2024-10-19 PROBLEM — M54.50 CHRONIC MIDLINE LOW BACK PAIN WITHOUT SCIATICA: Status: ACTIVE | Noted: 2024-10-19

## 2024-10-19 PROBLEM — G89.29 CHRONIC MIDLINE LOW BACK PAIN WITHOUT SCIATICA: Status: ACTIVE | Noted: 2024-10-19

## 2024-10-19 LAB
CHOLEST SERPL-MCNC: 248 MG/DL (ref 0–199)
CHOLESTEROL/HDL RATIO: 5.5
EST. AVERAGE GLUCOSE BLD GHB EST-MCNC: 103 MG/DL
HBA1C MFR BLD: 5.2 % (ref 4–6)
HDLC SERPL-MCNC: 45 MG/DL
LDLC SERPL CALC-MCNC: 180 MG/DL (ref 0–100)
T4 FREE SERPL-MCNC: 0.2 NG/DL (ref 0.9–1.7)
TRIGL SERPL-MCNC: 113 MG/DL (ref 0–149)
TSH SERPL DL<=0.05 MIU/L-ACNC: 86.7 UIU/ML (ref 0.27–4.2)

## 2024-10-19 PROCEDURE — 99232 SBSQ HOSP IP/OBS MODERATE 35: CPT

## 2024-10-19 PROCEDURE — 1240000000 HC EMOTIONAL WELLNESS R&B

## 2024-10-19 PROCEDURE — 99232 SBSQ HOSP IP/OBS MODERATE 35: CPT | Performed by: INTERNAL MEDICINE

## 2024-10-19 PROCEDURE — 99221 1ST HOSP IP/OBS SF/LOW 40: CPT | Performed by: ORTHOPAEDIC SURGERY

## 2024-10-19 PROCEDURE — 6370000000 HC RX 637 (ALT 250 FOR IP): Performed by: INTERNAL MEDICINE

## 2024-10-19 RX ADMIN — HYDROCORTISONE 20 MG: 10 TABLET ORAL at 09:56

## 2024-10-19 RX ADMIN — FERROUS SULFATE TAB 325 MG (65 MG ELEMENTAL FE) 325 MG: 325 (65 FE) TAB at 17:18

## 2024-10-19 RX ADMIN — CYANOCOBALAMIN TAB 1000 MCG 1000 MCG: 1000 TAB at 09:55

## 2024-10-19 RX ADMIN — LEVOTHYROXINE SODIUM 150 MCG: 0.07 TABLET ORAL at 07:05

## 2024-10-19 RX ADMIN — AMITRIPTYLINE HYDROCHLORIDE 25 MG: 25 TABLET, FILM COATED ORAL at 22:10

## 2024-10-19 RX ADMIN — FLUDROCORTISONE ACETATE 0.1 MG: 0.1 TABLET ORAL at 09:55

## 2024-10-19 RX ADMIN — FOLIC ACID 1 MG: 1 TABLET ORAL at 09:55

## 2024-10-19 RX ADMIN — HYDROCORTISONE 15 MG: 10 TABLET ORAL at 17:18

## 2024-10-19 RX ADMIN — PANTOPRAZOLE SODIUM 40 MG: 40 TABLET, DELAYED RELEASE ORAL at 07:05

## 2024-10-19 RX ADMIN — FERROUS SULFATE TAB 325 MG (65 MG ELEMENTAL FE) 325 MG: 325 (65 FE) TAB at 09:55

## 2024-10-19 RX ADMIN — FLUOXETINE HYDROCHLORIDE 60 MG: 20 CAPSULE ORAL at 09:55

## 2024-10-19 RX ADMIN — CHOLECALCIFEROL TAB 125 MCG (5000 UNIT) 5000 UNITS: 125 TAB at 09:55

## 2024-10-19 ASSESSMENT — PAIN DESCRIPTION - FREQUENCY: FREQUENCY: INTERMITTENT

## 2024-10-19 ASSESSMENT — PAIN DESCRIPTION - LOCATION: LOCATION: GENERALIZED

## 2024-10-19 ASSESSMENT — PAIN DESCRIPTION - DESCRIPTORS: DESCRIPTORS: ACHING

## 2024-10-19 ASSESSMENT — PAIN SCALES - GENERAL: PAINLEVEL_OUTOF10: 8

## 2024-10-19 NOTE — PLAN OF CARE
Problem: Self Harm/Suicidality  Goal: Will have no self-injury during hospital stay  Description: INTERVENTIONS:  1.  Ensure constant observer at bedside with Q15M safety checks  2.  Maintain a safe environment  3.  Secure patient belongings  4.  Ensure family/visitors adhere to safety recommendations  5.  Ensure safety tray has been added to patient's diet order  6.  Every shift and PRN: Re-assess suicidal risk via Frequent Screener    Flowsheets (Taken 10/19/2024 0205)  Will have no self-injury during hospital stay:   Maintain a safe environment   Every shift and PRN: Re-assess suicidal risk via Frequent Screener  Note: Pt reports depression and anxiety. Emotional support and reassurance provided as needed. Pt denies any suicidal or homicidal ideations, denies any hallucinations. Pt agreed to seek staff and report any intrusive thoughts of hurting self or others.  Pt remains free from any self-harm, safe environment maintained. Regular rounding 4 times an hour and spontaneous patient checks done for safety and per unit policy.        Problem: Depression  Goal: Will be euthymic at discharge  Description: INTERVENTIONS:  1. Administer medication as ordered  2. Provide emotional support via 1:1 interaction with staff  3. Encourage involvement in milieu/groups/activities  4. Monitor for social isolation  10/19/2024 0207 by Emelina Zabala, RN  Outcome: Progressing  Note: Patient reports improvement in depression, now rates it at 8 on a scale of 0-10 (10 being the worst depression and 0 being no depression). Patient isolative to room, reading a book.       Problem: Anxiety  Goal: Will report anxiety at manageable levels  Description: INTERVENTIONS:  1. Administer medication as ordered  2. Teach and rehearse alternative coping skills  3. Provide emotional support with 1:1 interaction with staff  10/19/2024 0207 by Emelina Zabala, RN  Flowsheets (Taken 10/19/2024 0207)  Will report anxiety at manageable

## 2024-10-19 NOTE — GROUP NOTE
Group Therapy Note    Date: 10/19/2024    Group Start Time: 1030  Group End Time: 1100  Group Topic: Cognitive Skills    Anika James CTRS    Cognitive Skills Group Note        Date: October 19, 2024 Start Time:  1030 am    End Time:  1100 am       Number of Participants in Group & Unit Census:  6/15    Topic: cognitive skills     Goal of Group: pt will demonstrate improved coping skills and improved interpersonal relationships       Comments:     Patient did not participate in Cognitive Skills group, despite staff encouragement and explanation of benefits.  Patient remain seclusive to self.  Q15 minute safety checks maintained for patient safety and will continue to encourage patient to attend unit programming.            Signature:  LEV OSPINA

## 2024-10-19 NOTE — PLAN OF CARE
Problem: Depression  Goal: Will be euthymic at discharge  Description: INTERVENTIONS:  1. Administer medication as ordered  2. Provide emotional support via 1:1 interaction with staff  3. Encourage involvement in milieu/groups/activities  4. Monitor for social isolation  10/19/2024 1033 by Eloise Devries, RN  Outcome: Progressing  Note: Patient reports Anxiety/Depression 8/10. Patient refused intervention at this time. Patient verbalized understanding the need to seek nursing staff with increased, non-bearable symptoms of Depression. Patient continues to be monitored Q 15 minutes for safety.     Problem: Anxiety  Goal: Will report anxiety at manageable levels  Description: INTERVENTIONS:  1. Administer medication as ordered  2. Teach and rehearse alternative coping skills  3. Provide emotional support with 1:1 interaction with staff  10/19/2024 1033 by Elosie Devries, RN  Outcome: Progressing  Flowsheets (Taken 10/19/2024 1033)  Will report anxiety at manageable levels:   Provide emotional support with 1:1 interaction with staff   Teach and rehearse alternative coping skills  Note: Patient reports Anxiety/Depression 8/10 yet refused intervention at this time. Patient encouraged to reach out to nursing staff as needed. Patient is to continue to be monitored Q 15 minutes for safety.

## 2024-10-19 NOTE — CONSULTS
Inpatient consult to Orthopedic Surgery  Consult performed by: Lucas Benitez MD  Consult ordered by: Johnathan Rosario MD        Patient: Yaritza Esquivel  Unit/Bed: 0205/0205-01  YOB: 1975  MRN: [id number]  Acct: [id number]   Admitting Diagnosis: Depression with suicidal ideation [F32.A, R45.851]  Admit Date:  10/17/2024  Hospital Day: 2    Subjective:    Patient is having problems with  chronic axial lumbar pain  HPI  Patient Seen, Chart, Labs, Radiologystudies, and Consults reviewed.      Patient with history of chronic low back pain    Patient reports her back pain has been worse over worse over the last 6 months.    No treatment has occurred    Patient denies numbness tingling weakness reports she is ambulatory    Objective:   /78   Pulse 50   Temp 97.6 °F (36.4 °C) (Temporal)   Resp 16   Ht 1.905 m (6' 3\")   Wt 84.8 kg (187 lb)   SpO2 93%   BMI 23.37 kg/m² No intake or output data in the 24 hours ending 10/19/24 1531  Review of Systems  Physical Exam  Vitals and nursing note reviewed.   Constitutional:       Appearance: She is well-developed.   HENT:      Head: Normocephalic and atraumatic.      Nose: Nose normal.   Eyes:      Conjunctiva/sclera: Conjunctivae normal.   Pulmonary:      Effort: Pulmonary effort is normal. No respiratory distress.   Musculoskeletal:      Cervical back: Normal range of motion and neck supple.      Comments: Normal gait     Skin:     General: Skin is warm and dry.   Neurological:      Mental Status: She is alert and oriented to person, place, and time.      Sensory: No sensory deficit.   Psychiatric:         Behavior: Behavior normal.         Thought Content: Thought content normal.           Drains:No  Imaging:Yes AP lateral lumbar spine no acute fractures patient with moderate scoliosis comparing with report from [place] [id number] no substantial change      Medications:    pantoprazole  40 mg Oral QAM AC    amitriptyline  25 mg Oral Nightly    ferrous

## 2024-10-20 PROCEDURE — 6370000000 HC RX 637 (ALT 250 FOR IP)

## 2024-10-20 PROCEDURE — 99232 SBSQ HOSP IP/OBS MODERATE 35: CPT | Performed by: INTERNAL MEDICINE

## 2024-10-20 PROCEDURE — 6370000000 HC RX 637 (ALT 250 FOR IP): Performed by: PSYCHIATRY & NEUROLOGY

## 2024-10-20 PROCEDURE — 99232 SBSQ HOSP IP/OBS MODERATE 35: CPT

## 2024-10-20 PROCEDURE — 1240000000 HC EMOTIONAL WELLNESS R&B

## 2024-10-20 PROCEDURE — 6370000000 HC RX 637 (ALT 250 FOR IP): Performed by: INTERNAL MEDICINE

## 2024-10-20 RX ORDER — LANOLIN ALCOHOL/MO/W.PET/CERES
3 CREAM (GRAM) TOPICAL NIGHTLY PRN
Status: DISCONTINUED | OUTPATIENT
Start: 2024-10-20 | End: 2024-10-23 | Stop reason: HOSPADM

## 2024-10-20 RX ADMIN — FLUOXETINE HYDROCHLORIDE 60 MG: 20 CAPSULE ORAL at 08:29

## 2024-10-20 RX ADMIN — FLUDROCORTISONE ACETATE 0.1 MG: 0.1 TABLET ORAL at 08:29

## 2024-10-20 RX ADMIN — FOLIC ACID 1 MG: 1 TABLET ORAL at 08:29

## 2024-10-20 RX ADMIN — Medication 3 MG: at 20:50

## 2024-10-20 RX ADMIN — HYDROXYZINE HYDROCHLORIDE 50 MG: 50 TABLET, FILM COATED ORAL at 20:50

## 2024-10-20 RX ADMIN — HYDROCORTISONE 15 MG: 10 TABLET ORAL at 17:17

## 2024-10-20 RX ADMIN — CYANOCOBALAMIN TAB 1000 MCG 1000 MCG: 1000 TAB at 08:29

## 2024-10-20 RX ADMIN — LEVOTHYROXINE SODIUM 150 MCG: 0.07 TABLET ORAL at 07:06

## 2024-10-20 RX ADMIN — AMITRIPTYLINE HYDROCHLORIDE 25 MG: 25 TABLET, FILM COATED ORAL at 20:50

## 2024-10-20 RX ADMIN — FERROUS SULFATE TAB 325 MG (65 MG ELEMENTAL FE) 325 MG: 325 (65 FE) TAB at 08:29

## 2024-10-20 RX ADMIN — CHOLECALCIFEROL TAB 125 MCG (5000 UNIT) 5000 UNITS: 125 TAB at 08:29

## 2024-10-20 RX ADMIN — PANTOPRAZOLE SODIUM 40 MG: 40 TABLET, DELAYED RELEASE ORAL at 07:06

## 2024-10-20 RX ADMIN — FERROUS SULFATE TAB 325 MG (65 MG ELEMENTAL FE) 325 MG: 325 (65 FE) TAB at 17:19

## 2024-10-20 RX ADMIN — HYDROCORTISONE 20 MG: 10 TABLET ORAL at 08:29

## 2024-10-20 RX ADMIN — HYDROXYZINE HYDROCHLORIDE 50 MG: 50 TABLET, FILM COATED ORAL at 12:40

## 2024-10-20 ASSESSMENT — LIFESTYLE VARIABLES
HOW OFTEN DO YOU HAVE A DRINK CONTAINING ALCOHOL: NEVER
HOW MANY STANDARD DRINKS CONTAINING ALCOHOL DO YOU HAVE ON A TYPICAL DAY: PATIENT DOES NOT DRINK
HOW MANY STANDARD DRINKS CONTAINING ALCOHOL DO YOU HAVE ON A TYPICAL DAY: PATIENT DOES NOT DRINK
HOW OFTEN DO YOU HAVE A DRINK CONTAINING ALCOHOL: NEVER

## 2024-10-20 ASSESSMENT — PAIN SCALES - GENERAL: PAINLEVEL_OUTOF10: 3

## 2024-10-20 NOTE — GROUP NOTE
Group Therapy Note    Date: 10/20/2024    Group Start Time: 1330  Group End Time: 1410  Group Topic: Cognitive Skills    Anika James CTRS    Cognitive Skills Group Note        Date: October 20, 2024 Start Time: 1:30pm  End Time:  210 pm       Number of Participants in Group & Unit Census:  4/15    Topic: Cognitive skills     Goal of Group: pt will demonstrate improved coping skills and improved communication skills       Comments:     Patient did not participate in Cognitive Skills group, despite staff encouragement and explanation of benefits.  Patient remain seclusive to self.  Q15 minute safety checks maintained for patient safety and will continue to encourage patient to attend unit programming.              Signature:  LEV OSPINA

## 2024-10-20 NOTE — GROUP NOTE
Group Therapy Note    Date: 10/20/2024    Group Start Time: 1000  Group End Time: 1030  Group Topic: Psychoeducation    [facility] Starr Holloway MSW, HOLLEY        Group Therapy Note    Attendees: 3/15       Patient refused to attend psychoeducation group at 10:15 am after encouragement from staff. 1:1 talk was offered as alternative to group; patient declined.      Discipline Responsible: /Counselor      Signature:  SULEMA Nichols, HOLLEY

## 2024-10-20 NOTE — PLAN OF CARE
Problem: Self Harm/Suicidality  Goal: Will have no self-injury during hospital stay  Description: INTERVENTIONS:  1.  Ensure constant observer at bedside with Q15M safety checks  2.  Maintain a safe environment  3.  Secure patient belongings  4.  Ensure family/visitors adhere to safety recommendations  5.  Ensure safety tray has been added to patient's diet order  6.  Every shift and PRN: Re-assess suicidal risk via Frequent Screener    10/20/2024 1314 by Eloise Devries, RN  Outcome: Progressing  Flowsheets (Taken 10/20/2024 1314)  Will have no self-injury during hospital stay:   Ensure constant observer at bedside with Q15M safety checks   Maintain a safe environment  Note: Patient denies having thoughts of self harming. Patient encouraged to seek staffing with thoughts of harming self or others. Patient is to continue with Q 15 minute checks for safety.     Problem: Anxiety  Goal: Will report anxiety at manageable levels  Description: INTERVENTIONS:  1. Administer medication as ordered  2. Teach and rehearse alternative coping skills  3. Provide emotional support with 1:1 interaction with staff  10/20/2024 1314 by Eloise Devries, RN  Outcome: Progressing  Flowsheets (Taken 10/20/2024 1314)  Will report anxiety at manageable levels:   Administer medication as ordered   Teach and rehearse alternative coping skills   Provide emotional support with 1:1 interaction with staff  Note: Patient reports anxiety 9/10. PRN oral intervention administered as ordered. Patient now laying in bed resting with eyes closed. Patient is to continue to be monitored Q 15 minute checks for safety.

## 2024-10-20 NOTE — PLAN OF CARE
Problem: Self Harm/Suicidality  Goal: Will have no self-injury during hospital stay  Description: INTERVENTIONS:  1.  Ensure constant observer at bedside with Q15M safety checks  2.  Maintain a safe environment  3.  Secure patient belongings  4.  Ensure family/visitors adhere to safety recommendations  5.  Ensure safety tray has been added to patient's diet order  6.  Every shift and PRN: Re-assess suicidal risk via Frequent Screener    Outcome: Progressing  Flowsheets (Taken 10/20/2024 0125)  Will have no self-injury during hospital stay:   Maintain a safe environment   Every shift and PRN: Re-assess suicidal risk via Frequent Screener  Note: Pt denies any suicidal or homicidal ideations, denies any hallucinations. Pt agreed to seek staff and report any intrusive thoughts of hurting self or others.  Pt remains free from any self-harm, safe environment maintained. Regular rounding 4 times an hour and spontaneous patient checks done for safety and per unit policy.        Problem: Depression  Goal: Will be euthymic at discharge  Description: INTERVENTIONS:  1. Administer medication as ordered  2. Provide emotional support via 1:1 interaction with staff  3. Encourage involvement in milieu/groups/activities  4. Monitor for social isolation  Outcome: Progressing  Note: Patient remains isolative to room, reading her book. Patient reports continues improvement in depression, now rates it at 6 on a scale of 0-10 (10 being the worst depression and 0 being no depression).         Problem: Anxiety  Goal: Will report anxiety at manageable levels  Description: INTERVENTIONS:  1. Administer medication as ordered  2. Teach and rehearse alternative coping skills  3. Provide emotional support with 1:1 interaction with staff  Outcome: Progressing  Flowsheets (Taken 10/20/2024 0131)  Will report anxiety at manageable levels:   Teach and rehearse alternative coping skills   Provide emotional support with 1:1 interaction with

## 2024-10-21 LAB
EKG ATRIAL RATE: 64 BPM
EKG P AXIS: 78 DEGREES
EKG P-R INTERVAL: 168 MS
EKG Q-T INTERVAL: 384 MS
EKG QRS DURATION: 68 MS
EKG QTC CALCULATION (BAZETT): 396 MS
EKG R AXIS: -9 DEGREES
EKG T AXIS: 29 DEGREES
EKG VENTRICULAR RATE: 64 BPM

## 2024-10-21 PROCEDURE — 6370000000 HC RX 637 (ALT 250 FOR IP): Performed by: INTERNAL MEDICINE

## 2024-10-21 PROCEDURE — 99232 SBSQ HOSP IP/OBS MODERATE 35: CPT | Performed by: PSYCHIATRY & NEUROLOGY

## 2024-10-21 PROCEDURE — 93010 ELECTROCARDIOGRAM REPORT: CPT | Performed by: INTERNAL MEDICINE

## 2024-10-21 PROCEDURE — 6370000000 HC RX 637 (ALT 250 FOR IP)

## 2024-10-21 PROCEDURE — APPSS30 APP SPLIT SHARED TIME 16-30 MINUTES

## 2024-10-21 PROCEDURE — 6370000000 HC RX 637 (ALT 250 FOR IP): Performed by: PSYCHIATRY & NEUROLOGY

## 2024-10-21 PROCEDURE — 1240000000 HC EMOTIONAL WELLNESS R&B

## 2024-10-21 RX ADMIN — FERROUS SULFATE TAB 325 MG (65 MG ELEMENTAL FE) 325 MG: 325 (65 FE) TAB at 16:46

## 2024-10-21 RX ADMIN — AMITRIPTYLINE HYDROCHLORIDE 25 MG: 25 TABLET, FILM COATED ORAL at 21:27

## 2024-10-21 RX ADMIN — HYDROXYZINE HYDROCHLORIDE 50 MG: 50 TABLET, FILM COATED ORAL at 17:05

## 2024-10-21 RX ADMIN — Medication 3 MG: at 21:27

## 2024-10-21 RX ADMIN — FOLIC ACID 1 MG: 1 TABLET ORAL at 09:12

## 2024-10-21 RX ADMIN — PANTOPRAZOLE SODIUM 40 MG: 40 TABLET, DELAYED RELEASE ORAL at 06:59

## 2024-10-21 RX ADMIN — CHOLECALCIFEROL TAB 125 MCG (5000 UNIT) 5000 UNITS: 125 TAB at 09:11

## 2024-10-21 RX ADMIN — FLUOXETINE HYDROCHLORIDE 60 MG: 20 CAPSULE ORAL at 09:12

## 2024-10-21 RX ADMIN — BUTALBITAL, ASPIRIN, AND CAFFEINE 1 CAPSULE: 50; 325; 40 CAPSULE ORAL at 09:17

## 2024-10-21 RX ADMIN — FLUDROCORTISONE ACETATE 0.1 MG: 0.1 TABLET ORAL at 09:15

## 2024-10-21 RX ADMIN — HYDROCORTISONE 20 MG: 10 TABLET ORAL at 09:11

## 2024-10-21 RX ADMIN — LEVOTHYROXINE SODIUM 150 MCG: 0.07 TABLET ORAL at 06:59

## 2024-10-21 RX ADMIN — FERROUS SULFATE TAB 325 MG (65 MG ELEMENTAL FE) 325 MG: 325 (65 FE) TAB at 09:11

## 2024-10-21 RX ADMIN — HYDROCORTISONE 15 MG: 10 TABLET ORAL at 16:46

## 2024-10-21 RX ADMIN — CYANOCOBALAMIN TAB 1000 MCG 1000 MCG: 1000 TAB at 09:11

## 2024-10-21 ASSESSMENT — PAIN SCALES - WONG BAKER
WONGBAKER_NUMERICALRESPONSE: HURTS A LITTLE BIT
WONGBAKER_NUMERICALRESPONSE: HURTS A LITTLE BIT

## 2024-10-21 ASSESSMENT — PAIN SCALES - GENERAL
PAINLEVEL_OUTOF10: 4
PAINLEVEL_OUTOF10: 9
PAINLEVEL_OUTOF10: 3
PAINLEVEL_OUTOF10: 3

## 2024-10-21 ASSESSMENT — PAIN DESCRIPTION - LOCATION
LOCATION: HEAD
LOCATION: HEAD

## 2024-10-21 ASSESSMENT — LIFESTYLE VARIABLES
HOW OFTEN DO YOU HAVE A DRINK CONTAINING ALCOHOL: NEVER
HOW MANY STANDARD DRINKS CONTAINING ALCOHOL DO YOU HAVE ON A TYPICAL DAY: PATIENT DOES NOT DRINK

## 2024-10-21 ASSESSMENT — PAIN DESCRIPTION - DESCRIPTORS: DESCRIPTORS: ACHING;POUNDING

## 2024-10-21 NOTE — PLAN OF CARE
Problem: Anxiety  Goal: Will report anxiety at manageable levels  Description: INTERVENTIONS:  1. Administer medication as ordered  2. Teach and rehearse alternative coping skills  3. Provide emotional support with 1:1 interaction with staff  Outcome: Progressing  Flowsheets (Taken 10/21/2024 1504)  Will report anxiety at manageable levels: Teach and rehearse alternative coping skills  Note: Patient reports anxiety 3/10 declined PRN meds \"it's not out of control\". Patient encouraged to seek staffing with increased symptoms of anxiety. Q 15 minute checks continues for safety.     Problem: Pain  Goal: Verbalizes/displays adequate comfort level or baseline comfort level  Outcome: Progressing  Flowsheets (Taken 10/21/2024 1504)  Verbalizes/displays adequate comfort level or baseline comfort level:   Encourage patient to monitor pain and request assistance   Assess pain using appropriate pain scale   Administer analgesics based on type and severity of pain and evaluate response  Note: PRN med given for headache 9/10. Non-pharmacological measures taken by laying quietly in bed eyes closed. Patient reports PRN medication was effective. Patient encouraged to seek staffing if pain persist.

## 2024-10-21 NOTE — GROUP NOTE
Group Therapy Note    Date: 10/21/2024    Group Start Time: 1100  Group End Time: 1135  Group Topic: Recreational    [facility] Faiza Chavez CTRS        Group Therapy Note    Attendees: 4/16    Recreation Group Note        Date: October 21, 2024 Start Time: 11am  End Time: 11:35am      Number of Participants in Group & Unit Census:  4/16    Topic: interpersonal skills, leisure awareness, concentration     Goal of Group: To improve interpersonal skills and leisure awareness through collaborating with peers and concentrating on a presented task.       Comments:     Patient did not participate in Recreation group, despite staff encouragement and explanation of benefits.  Patient remain seclusive to self.  Q15 minute safety checks maintained for patient safety and will continue to encourage patient to attend unit programming.        Signature:  LEV Mares

## 2024-10-21 NOTE — GROUP NOTE
Group Therapy Note    Date: 10/21/2024    Group Start Time: 1000  Group End Time: 1020  Group Topic: Psychoeducation    Kaden Hammond        Group Therapy Note    Attendees: 3/16       Patient was offered group therapy today but declined to participate despite encouragement from staff. 1:1 was offered.    Signature:  Kaden Ren

## 2024-10-21 NOTE — GROUP NOTE
Group Therapy Note    Date: 10/21/2024    Group Start Time: 1430  Group End Time: 1515  Group Topic: Cognitive Skills    Faiza Amaro CTRS        Group Therapy Note    Attendees: 6/15    Cognitive Skills Group Note        Date: October 21, 2024 Start Time: 2:30pm  End Time: 3:15pm      Number of Participants in Group & Unit Census:  6/15    Topic:  interpersonal skills, decision-making, self-expression    Goal of Group: To improve interpersonal skills and decision-making through collaborating with peers and demonstrating self-expression.      Comments:     Patient did not participate in Cognitive Skills group, despite staff encouragement and explanation of benefits.  Patient remain seclusive to self.  Q15 minute safety checks maintained for patient safety and will continue to encourage patient to attend unit programming.        Signature:  LEV Mares

## 2024-10-21 NOTE — PLAN OF CARE
Problem: Self Harm/Suicidality  Goal: Will have no self-injury during hospital stay  Description: INTERVENTIONS:  1.  Ensure constant observer at bedside with Q15M safety checks  2.  Maintain a safe environment  3.  Secure patient belongings  4.  Ensure family/visitors adhere to safety recommendations  5.  Ensure safety tray has been added to patient's diet order  6.  Every shift and PRN: Re-assess suicidal risk via Frequent Screener    10/20/2024 2331 by Yuniel Birmingham LPN  Outcome: Progressing  Note: Patient denies any thoughts to harm self and no signs of self harm were noted. Patient encouraged to inform staff if she starts to develop any thoughts to harm self. Patient voiced understanding.      Problem: Depression  Goal: Will be euthymic at discharge  Description: INTERVENTIONS:  1. Administer medication as ordered  2. Provide emotional support via 1:1 interaction with staff  3. Encourage involvement in milieu/groups/activities  4. Monitor for social isolation  Outcome: Progressing  Note: Patient has been calm and cooperative this shift.     Problem: Anxiety  Goal: Will report anxiety at manageable levels  Description: INTERVENTIONS:  1. Administer medication as ordered  2. Teach and rehearse alternative coping skills  3. Provide emotional support with 1:1 interaction with staff  10/20/2024 2331 by Yuniel Birmingham LPN  Outcome: Progressing  Note: Pt encouraged to explore coping skills for reducing anxiety. None verbalized at this time.       Problem: Pain  Goal: Verbalizes/displays adequate comfort level or baseline comfort level  Outcome: Progressing  Note: Patient denies any pain currently. Patient encouraged to inform staff if he develops any pain. Patient voiced understanding.

## 2024-10-22 PROCEDURE — 1240000000 HC EMOTIONAL WELLNESS R&B

## 2024-10-22 PROCEDURE — 6370000000 HC RX 637 (ALT 250 FOR IP): Performed by: PSYCHIATRY & NEUROLOGY

## 2024-10-22 PROCEDURE — APPSS30 APP SPLIT SHARED TIME 16-30 MINUTES

## 2024-10-22 PROCEDURE — 6370000000 HC RX 637 (ALT 250 FOR IP): Performed by: INTERNAL MEDICINE

## 2024-10-22 PROCEDURE — 6370000000 HC RX 637 (ALT 250 FOR IP)

## 2024-10-22 PROCEDURE — 99232 SBSQ HOSP IP/OBS MODERATE 35: CPT | Performed by: PSYCHIATRY & NEUROLOGY

## 2024-10-22 RX ADMIN — FLUDROCORTISONE ACETATE 0.1 MG: 0.1 TABLET ORAL at 08:25

## 2024-10-22 RX ADMIN — FERROUS SULFATE TAB 325 MG (65 MG ELEMENTAL FE) 325 MG: 325 (65 FE) TAB at 08:22

## 2024-10-22 RX ADMIN — HYDROXYZINE HYDROCHLORIDE 50 MG: 50 TABLET, FILM COATED ORAL at 09:10

## 2024-10-22 RX ADMIN — HYDROXYZINE HYDROCHLORIDE 50 MG: 50 TABLET, FILM COATED ORAL at 20:33

## 2024-10-22 RX ADMIN — Medication 3 MG: at 20:33

## 2024-10-22 RX ADMIN — FLUOXETINE HYDROCHLORIDE 60 MG: 20 CAPSULE ORAL at 08:22

## 2024-10-22 RX ADMIN — AMITRIPTYLINE HYDROCHLORIDE 25 MG: 25 TABLET, FILM COATED ORAL at 20:33

## 2024-10-22 RX ADMIN — HYDROCORTISONE 20 MG: 10 TABLET ORAL at 08:26

## 2024-10-22 RX ADMIN — FERROUS SULFATE TAB 325 MG (65 MG ELEMENTAL FE) 325 MG: 325 (65 FE) TAB at 17:30

## 2024-10-22 RX ADMIN — PANTOPRAZOLE SODIUM 40 MG: 40 TABLET, DELAYED RELEASE ORAL at 06:45

## 2024-10-22 RX ADMIN — HYDROCORTISONE 15 MG: 10 TABLET ORAL at 17:30

## 2024-10-22 RX ADMIN — CYANOCOBALAMIN TAB 1000 MCG 1000 MCG: 1000 TAB at 08:26

## 2024-10-22 RX ADMIN — CHOLECALCIFEROL TAB 125 MCG (5000 UNIT) 5000 UNITS: 125 TAB at 08:26

## 2024-10-22 RX ADMIN — LEVOTHYROXINE SODIUM 150 MCG: 0.07 TABLET ORAL at 06:45

## 2024-10-22 RX ADMIN — FOLIC ACID 1 MG: 1 TABLET ORAL at 08:22

## 2024-10-22 ASSESSMENT — PAIN SCALES - GENERAL: PAINLEVEL_OUTOF10: 2

## 2024-10-22 ASSESSMENT — LIFESTYLE VARIABLES
HOW OFTEN DO YOU HAVE A DRINK CONTAINING ALCOHOL: NEVER
HOW MANY STANDARD DRINKS CONTAINING ALCOHOL DO YOU HAVE ON A TYPICAL DAY: PATIENT DOES NOT DRINK
HOW MANY STANDARD DRINKS CONTAINING ALCOHOL DO YOU HAVE ON A TYPICAL DAY: PATIENT DOES NOT DRINK

## 2024-10-22 ASSESSMENT — PAIN DESCRIPTION - LOCATION: LOCATION: GENERALIZED

## 2024-10-22 NOTE — GROUP NOTE
Group Therapy Note    Date: 10/22/2024    Group Start Time: 1000  Group End Time: 1020  Group Topic: Psychotherapy     Pema Light MSW        Group Therapy Note    Attendees: 1/16     Patient was offered group therapy today but declined to participate despite encouragement from staff.  1:1 was offered.    Discipline Responsible: /Counselor      Signature:  SULEMA Rosales

## 2024-10-22 NOTE — GROUP NOTE
Group Therapy Note    Date: 10/22/2024    Group Start Time: 1430  Group End Time: 1540  Group Topic: Cognitive Skills    Rosemary Gracia CTRS        Group Therapy Note    Attendees: 4/15     Topic: To increase socialization, practice decision making skills, and concentration       Comments:   Patient did not participate in Cognitive Skills Group, at 1430, despite staff encouragement   and explanation of benefits. Pt was seclusive to self and room.     Q15 minute safety checks maintained for patient safety and will continue to encourage   patient to attend unit programming.       Discipline Responsible: Psychoeducational Specialist   Signature: LEV BENITEZ

## 2024-10-22 NOTE — PLAN OF CARE
Problem: Anxiety  Goal: Will report anxiety at manageable levels  Description: INTERVENTIONS:  1. Administer medication as ordered  2. Teach and rehearse alternative coping skills  3. Provide emotional support with 1:1 interaction with staff  Flowsheets (Taken 10/22/2024 1047)  Will report anxiety at manageable levels:   Administer medication as ordered   Teach and rehearse alternative coping skills  Note: Patient reports anxiety 10/10. PRN intervention provided as ordered. Patient now reports medication being helpful. Patient now laying in bed resting. Will continue monitoring patient Q 15 minutes for safety.     Problem: Depression  Goal: Will be euthymic at discharge  Description: INTERVENTIONS:  1. Administer medication as ordered  2. Provide emotional support via 1:1 interaction with staff  3. Encourage involvement in milieu/groups/activities  4. Monitor for social isolation  10/22/2024 1047 by Eloise Devries, RN  Note: Patient has been isolative to room per shift. Depression 7/10. Patient encouraged to seek nursing staff if needed. Patient remains on Q 15 minute checks for safety.

## 2024-10-22 NOTE — GROUP NOTE
Group Therapy Note    Date: 10/22/2024    Group Start Time: 1100  Group End Time: 1155  Group Topic: Cognitive Skills    Rosemary Gracia CTRS        Group Therapy Note    Attendees: 2/16     Topic: To increase socialization, reminisce about past positive experiences and relationships,   and current positive attributes and potential for positive experiences through music and discussion.          Comments:   Patient did not participate in Cognitive Skills Group, at 1100, despite staff encouragement   and explanation of benefits. Pt was seclusive to self and room.     Q15 minute safety checks maintained for patient safety and will continue to encourage   patient to attend unit programming.       Discipline Responsible: Psychoeducational Specialist   Signature: LEV BENITEZ

## 2024-10-22 NOTE — CARE COORDINATION
Social work received phone call from Harper with [employer] asking if patient was still in the hospital and when expected discharge would be while offering assistance with disposition to member if needed.  She can be reached at [phone].

## 2024-10-22 NOTE — PLAN OF CARE
Problem: Self Harm/Suicidality  Goal: Will have no self-injury during hospital stay  Description: INTERVENTIONS:  1.  Ensure constant observer at bedside with Q15M safety checks  2.  Maintain a safe environment  3.  Secure patient belongings  4.  Ensure family/visitors adhere to safety recommendations  5.  Ensure safety tray has been added to patient's diet order  6.  Every shift and PRN: Re-assess suicidal risk via Frequent Screener  Outcome: Progressing     Patient continues to have fleeting suicidal thoughts and contracts for safety while on unit. No self injury occurred during shift. Safe environment and Q15 minute safety checks maintained.    Problem: Depression  Goal: Will be euthymic at discharge  Description: INTERVENTIONS:  1. Administer medication as ordered  2. Provide emotional support via 1:1 interaction with staff  3. Encourage involvement in milieu/groups/activities  4. Monitor for social isolation  Outcome: Progressing     Patient stated depression is improving and down to a 7/10. Patient states this is about her baseline. Patient cooperated with medication administration and was intermittently in the dayroom, although isolated to self and aloof of peers.

## 2024-10-23 VITALS
DIASTOLIC BLOOD PRESSURE: 75 MMHG | WEIGHT: 187 LBS | RESPIRATION RATE: 18 BRPM | HEART RATE: 65 BPM | BODY MASS INDEX: 25.33 KG/M2 | TEMPERATURE: 98.4 F | SYSTOLIC BLOOD PRESSURE: 115 MMHG | OXYGEN SATURATION: 98 % | HEIGHT: 72 IN

## 2024-10-23 PROCEDURE — 90656 IIV3 VACC NO PRSV 0.5 ML IM: CPT | Performed by: INTERNAL MEDICINE

## 2024-10-23 PROCEDURE — G0008 ADMIN INFLUENZA VIRUS VAC: HCPCS | Performed by: INTERNAL MEDICINE

## 2024-10-23 PROCEDURE — 6370000000 HC RX 637 (ALT 250 FOR IP): Performed by: INTERNAL MEDICINE

## 2024-10-23 PROCEDURE — 6360000002 HC RX W HCPCS: Performed by: INTERNAL MEDICINE

## 2024-10-23 PROCEDURE — 99239 HOSP IP/OBS DSCHRG MGMT >30: CPT | Performed by: PSYCHIATRY & NEUROLOGY

## 2024-10-23 RX ORDER — FERROUS SULFATE 325(65) MG
325 TABLET ORAL 2 TIMES DAILY
Qty: 30 TABLET | Refills: 3 | Status: SHIPPED | OUTPATIENT
Start: 2024-10-23

## 2024-10-23 RX ORDER — LANOLIN ALCOHOL/MO/W.PET/CERES
1000 CREAM (GRAM) TOPICAL DAILY
Qty: 30 TABLET | Refills: 3 | Status: SHIPPED | OUTPATIENT
Start: 2024-10-23

## 2024-10-23 RX ORDER — LANOLIN ALCOHOL/MO/W.PET/CERES
3 CREAM (GRAM) TOPICAL NIGHTLY PRN
Qty: 30 TABLET | Refills: 3 | Status: SHIPPED | OUTPATIENT
Start: 2024-10-23

## 2024-10-23 RX ORDER — FOLIC ACID 1 MG/1
1 TABLET ORAL DAILY
Qty: 30 TABLET | Refills: 3 | Status: SHIPPED | OUTPATIENT
Start: 2024-10-23

## 2024-10-23 RX ORDER — HYDROCORTISONE 5 MG/1
15 TABLET ORAL EVERY EVENING
Qty: 3 TABLET | Refills: 0 | Status: SHIPPED | OUTPATIENT
Start: 2024-10-23

## 2024-10-23 RX ORDER — LEVOTHYROXINE SODIUM 150 UG/1
150 TABLET ORAL DAILY
Qty: 30 TABLET | Refills: 3 | Status: SHIPPED | OUTPATIENT
Start: 2024-10-23

## 2024-10-23 RX ORDER — FLUDROCORTISONE ACETATE 0.1 MG/1
0.1 TABLET ORAL DAILY
Qty: 30 TABLET | Refills: 3 | Status: SHIPPED | OUTPATIENT
Start: 2024-10-23

## 2024-10-23 RX ORDER — HYDROCORTISONE 20 MG/1
20 TABLET ORAL DAILY
Qty: 30 TABLET | Refills: 0 | Status: SHIPPED | OUTPATIENT
Start: 2024-10-24

## 2024-10-23 RX ADMIN — PANTOPRAZOLE SODIUM 40 MG: 40 TABLET, DELAYED RELEASE ORAL at 06:45

## 2024-10-23 RX ADMIN — LEVOTHYROXINE SODIUM 150 MCG: 0.07 TABLET ORAL at 06:45

## 2024-10-23 RX ADMIN — INFLUENZA A VIRUS A/VICTORIA/4897/2022 IVR-238 (H1N1) ANTIGEN (PROPIOLACTONE INACTIVATED), INFLUENZA A VIRUS A/THAILAND/8/2022 IVR-237 (H3N2) ANTIGEN (PROPIOLACTONE INACTIVATED), INFLUENZA B VIRUS B/AUSTRIA/1359417/2021 BVR-26 ANTIGEN (PROPIOLACTONE INACTIVATED) 0.5 ML: 15; 15; 15 INJECTION, SUSPENSION INTRAMUSCULAR at 12:48

## 2024-10-23 RX ADMIN — FLUOXETINE HYDROCHLORIDE 60 MG: 20 CAPSULE ORAL at 08:09

## 2024-10-23 RX ADMIN — CYANOCOBALAMIN TAB 1000 MCG 1000 MCG: 1000 TAB at 08:10

## 2024-10-23 RX ADMIN — FLUDROCORTISONE ACETATE 0.1 MG: 0.1 TABLET ORAL at 08:10

## 2024-10-23 RX ADMIN — HYDROCORTISONE 20 MG: 10 TABLET ORAL at 08:09

## 2024-10-23 RX ADMIN — CHOLECALCIFEROL TAB 125 MCG (5000 UNIT) 5000 UNITS: 125 TAB at 08:10

## 2024-10-23 RX ADMIN — FERROUS SULFATE TAB 325 MG (65 MG ELEMENTAL FE) 325 MG: 325 (65 FE) TAB at 08:10

## 2024-10-23 RX ADMIN — FOLIC ACID 1 MG: 1 TABLET ORAL at 08:10

## 2024-10-23 ASSESSMENT — PAIN SCALES - GENERAL: PAINLEVEL_OUTOF10: 2

## 2024-10-23 NOTE — DISCHARGE SUMMARY
Problems:    * No resolved hospital problems. *      LABS:    No results for input(s): \"WBC\", \"HGB\", \"PLT\" in the last 72 hours.  No results for input(s): \"NA\", \"K\", \"CL\", \"CO2\", \"BUN\", \"CREATININE\", \"GLUCOSE\" in the last 72 hours.  No results for input(s): \"BILITOT\", \"ALKPHOS\", \"AST\", \"ALT\" in the last 72 hours.  Lab Results   Component Value Date/Time    BARBSCNU NEGATIVE 10/16/2024 04:35 PM    LABBENZ NEGATIVE 10/16/2024 04:35 PM    LABMETH NEGATIVE 10/16/2024 04:35 PM    PPXUR NOT REPORTED 08/09/2019 02:18 PM     Lab Results   Component Value Date/Time    TSH 86.70 10/17/2024 05:54 AM     No results found for: \"LITHIUM\"  No results found for: \"VALPROATE\", \"CBMZ\"    RISK ASSESSMENT AT DISCHARGE: Low risk for suicide and homicide.     Treatment Plan:  Reviewed current Medications with the patient. Education provided on the complaince with treatment.    Risks, benefits, side effects, drug-to-drug interactions and alternatives to treatment were discussed.    Encourage patient to attend outpatient follow up appointment and therapy.    Patient was advised to call the outpatient provider, visit the nearest ED or call 911 if symptoms are not manageable.        Medication List        START taking these medications      melatonin 3 MG Tabs tablet  Take 1 tablet by mouth nightly as needed (sleep)            CHANGE how you take these medications      * hydrocortisone 5 MG tablet  Commonly known as: CORTEF  Take 3 tablets by mouth every evening  What changed: You were already taking a medication with the same name, and this prescription was added. Make sure you understand how and when to take each.     * hydrocortisone 20 MG tablet  Commonly known as: CORTEF  Take 1 tablet by mouth daily  Start taking on: October 24, 2024  What changed:   medication strength  how much to take  how to take this  when to take this  additional instructions           * This list has 2 medication(s) that are the same as other medications

## 2024-10-23 NOTE — BH NOTE
[facility]  Admission Note     Admission Type:   Voluntary     Reason for admission:  Reason for Admission: Worsening depression and suicidal thoughts with [place].      Addictive Behavior:   Addictive Behavior  In the Past 3 Months, Have You Felt or Has Someone Told You That You Have a Problem With  : None    Medical Problems:   Past Medical History:   Diagnosis Date    Depression     Headache     Hypothyroidism     MEN (multiple endocrine neoplasia) (HCC)        Status EXAM:  Mental Status and Behavioral Exam  Normal: No  Level of Assistance: Independent/Self  Facial Expression: Flat, Sad  Affect: Appropriate  Level of Consciousness: Alert  Frequency of Checks: 4 times per hour, close  Mood:Normal: No  Mood: Depressed, Anxious, Empty, Helpless, Sad  Motor Activity:Normal: No  Motor Activity: Unusual posture/gait  Eye Contact: Fair  Observed Behavior: Withdrawn, Cooperative, Friendly, Guarded, Preoccupied  Sexual Misconduct History: Current - no  Preception: [place] to person, [place] to time, [place] to place, [place] to situation  Attention:Normal: Yes  Thought Processes: Unremarkable  Thought Content:Normal: No  Thought Content: Preoccupations  Depression Symptoms: Feelings of helplessness, Feelings of worthlessness, Sleep disturbance  Anxiety Symptoms: Generalized  Marni Symptoms: No problems reported or observed.  Hallucinations: None  Delusions: No  Memory:Normal: Yes  Insight and Judgment: No  Insight and Judgment: Poor judgment    Tobacco Screening:  Practical Counseling, on admission, ko X, if applicable and completed (first 3 are required if patient doesn't refuse):            ( ) Recognizing danger situations (included triggers and roadblocks)                    ( ) Coping skills (new ways to manage stress,relaxation techniques, changing routine, distraction)                                                           ( ) Basic information about 
[facility]  Discharge Note    Pt discharged with followings belongings:   Dental Appliances: None  Vision - Corrective Lenses: None  Hearing Aid: None  Jewelry: Necklace (adrenal insuffiency necklace)  Body Piercings Removed: N/A  Clothing: Pants, Footwear, Shirt, Socks, Undergarments  Other Valuables: Other (Comment) (jacqueline holbrook ID)   Valuables sent home with patient or returned to patient. Patient educated on aftercare instructions: yes  Information faxed to [facility] by staff  at 1:42 PM .Patient verbalize understanding of AVS:  yes.    Status EXAM upon discharge:  Mental Status and Behavioral Exam  Normal: No  Level of Assistance: Independent/Self  Facial Expression: Worried, Flat  Affect: Appropriate  Level of Consciousness: Alert  Frequency of Checks: 4 times per hour, close  Mood:Normal: No  Mood: Depressed, Anxious  Motor Activity:Normal: No  Motor Activity: Unusual posture/gait  Eye Contact: Good  Observed Behavior: Cooperative, Withdrawn  Sexual Misconduct History: Current - no  Preception: [place] to person, [place] to time, [place] to place, [place] to situation  Attention:Normal: Yes  Thought Processes: Unremarkable  Thought Content:Normal: Yes  Thought Content: Preoccupations  Depression Symptoms: Isolative, Loss of interest, Feelings of helplessness, Feelings of hopelessess  Anxiety Symptoms: Generalized  Marni Symptoms: No problems reported or observed.  Hallucinations: None  Delusions: No  Memory:Normal: Yes  Insight and Judgment: No  Insight and Judgment: Poor judgment    Tobacco Screening:  Practical Counseling, on admission, ko X, if applicable and completed (first 3 are required if patient doesn't refuse):            ( ) Recognizing danger situations (included triggers and roadblocks)                    ( ) Coping skills (new ways to manage stress,relaxation techniques, changing routine, distraction)                                                           ( ) Basic 
Discharge OQ complete.  
EKG completed at this time, result placed in patient chart.  
Group Note          Patient did not participate in Community Meeting/goals group, despite staff encouragement and explanation of benefits.  Patient remain seclusive to self.  Q15 minute safety checks maintained for patient safety and will continue to encourage patient to attend unit programming.    
OQ Analyst ID: [id number]   
On call provider, Dr. Avelar, notified of best practice advisory suggesting to place patient on suicide precautions. Provider to discontinue the order as patient does not meet criteria for suicide precautions at this time. Continue to observe patient on every 15 minute checks.    
Patient given quit line phone number [phone] at this time, refusing to call at this time, states \" I just don't want to quit now\"-  dangers of longterm tobacco use discussed.  staff will continue to reinforce importance of smoking cessation.   
Patient is a non-smoker. No education necessary.  
Sunday Rm safety checks complete. Pt participates without issue. Pt Rm free of contraband and items not belonging in Pt Rm.   
regarding your bill: Call HELP program [phone]     Suicide Hotline ([facility] Crisis Care Line)  (898) 934-9815      Recovery Help line- [phone]      To obtain results of pending studies call Medical Records at: [phone]     For emergencies and 24 hour/7 days a week contact information:  [phone]  
frame for Long-Term Goals: 6 months  Members Present in Team Meeting: See Signature Sheet    Caprice Alvarez RN

## 2024-10-23 NOTE — TRANSITION OF CARE
tablet  hydrocortisone 5 MG tablet  levothyroxine 150 MCG tablet  melatonin 3 MG Tabs tablet  vitamin B-12 1000 MCG tablet  vitamin D 125 MCG (5000 UT) Caps capsule         Unresulted Labs (24h ago, onward)      None            To obtain results of studies pending at discharge, please contact [phone]    Follow-up Information       Follow up With Specialties Details Why Contact Info    [facility] on 10/24/2024 You have a [facility] hospital follow-up appointment on THURS, Oct 24th at 9:30AM at the [facility] [address] [address]  [phone]             Advanced Directive:   Does the patient have an appointed surrogate decision maker? No  Does the patient have a Medical Advance Directive? No  Does the patient have a Psychiatric Advance Directive? No  If the patient does not have a surrogate or Medical Advance Directive AND Psychiatric Advance Directive, the patient was offered information on these advance directives Patient declined to complete    Patient Instructions: Please continue all medications until otherwise directed by physician.  Please attend all follow up appointments.    Tobacco Cessation Discharge Plan:   Is the patient a tobacco user  and needs referral for tobacco cessation? Yes  Patient referred to the following for tobacco cessation with an appointment? Patient refused  Patient was offered medication to assist with tobacco cessation at discharge? Patient refused    Alcohol/Substance Abuse Discharge Plan:   Does the patient have a history of substance/alcohol abuse and requires a referral for treatment? No  Patient referred to the following for substance/alcohol abuse treatment with an appointment? Patient refused  Patient was offered medication to assist with substance/alcohol abuse cessation at discharge? Patient refused      Patient discharged to: Home; Transition record discussed with patient/caregiver and provided this record in 
, [facility] center

## 2024-10-23 NOTE — CARE COORDINATION
Name: Yaritza Esquivel    : 1975    Auth number: [id number]     Discharge Date: 10/23/2024    Destination: [facility]    *If you have any specific discharge questions, please contact the assigned /discharge planner: Kaden [phone]      Discharge Medications:      Medication List        START taking these medications      melatonin 3 MG Tabs tablet  Take 1 tablet by mouth nightly as needed (sleep)  Notes to patient: To help with sleep            CHANGE how you take these medications      * hydrocortisone 5 MG tablet  Commonly known as: CORTEF  Take 3 tablets by mouth every evening  What changed: You were already taking a medication with the same name, and this prescription was added. Make sure you understand how and when to take each.     * hydrocortisone 20 MG tablet  Commonly known as: CORTEF  Take 1 tablet by mouth daily  Start taking on: 2024  What changed:   medication strength  how much to take  how to take this  when to take this  additional instructions           * This list has 2 medication(s) that are the same as other medications prescribed for you. Read the directions carefully, and ask your doctor or other care provider to review them with you.                CONTINUE taking these medications      amitriptyline 25 MG tablet  Commonly known as: ELAVIL  Take 1 tablet by mouth nightly  Notes to patient: To help with mood     ferrous sulfate 325 (65 Fe) MG tablet  Commonly known as: IRON 325  Take 1 tablet by mouth in the morning and at bedtime  Notes to patient: To help with overall health     fludrocortisone 0.1 MG tablet  Commonly known as: FLORINEF  Take 1 tablet by mouth daily  Notes to patient: To help with hormone regulation     FLUoxetine 20 MG capsule  Commonly known as: PROZAC  Take 3 capsules by mouth daily  Notes to patient: To help with mood     folic acid 1 MG tablet  Commonly known as: FOLVITE  Take 1 tablet by mouth daily  Notes to patient: To help with

## 2024-10-23 NOTE — GROUP NOTE
Group Therapy Note    Date: 10/23/2024    Group Start Time: 1100  Group End Time: 1135  Group Topic: Cognitive Skills    Anika James CTRS    Cognitive Skills Group Note        Date: October 23, 2024 Start Time: 11am  End Time:  1135 am       Number of Participants in Group & Unit Census:  4/14    Topic: cognitive skills     Goal of Group: pt will demonstrate improved coping skills and improved interpersonal relationships       Comments:     Patient did not participate in Cognitive Skills group, despite staff encouragement and explanation of benefits.  Patient remain seclusive to self.  Q15 minute safety checks maintained for patient safety and will continue to encourage patient to attend unit programming.            Signature:  LEV OSPNIA

## 2024-10-23 NOTE — GROUP NOTE
Group Therapy Note    Date: 10/23/2024    Group Start Time: 1000  Group End Time: 1030  Group Topic: Psychotherapy     Pema Light MSW        Group Therapy Note    Attendees: 1/14     Patient was offered group therapy today but declined to participate despite encouragement from staff.  1:1 was offered.    Discipline Responsible: /Counselor      Signature:  SULEMA Rosales

## 2024-10-23 NOTE — PLAN OF CARE
Problem: Self Harm/Suicidality  Goal: Will have no self-injury during hospital stay  Description: INTERVENTIONS:  1.  Ensure constant observer at bedside with Q15M safety checks  2.  Maintain a safe environment  3.  Secure patient belongings  4.  Ensure family/visitors adhere to safety recommendations  5.  Ensure safety tray has been added to patient's diet order  6.  Every shift and PRN: Re-assess suicidal risk via Frequent Screener    10/22/2024 2243 by Cecily Gordon RN  Outcome: Progressing  Note: Patient is cooperative, friendly, withdrawn, guarded and worried. Endorses anxiety and depression at a manageable level. Denies suicidal ideations. Pt denies thoughts of self harm and is agreeable to seeking out should thoughts of self harm arise. Safe environment maintained.  Every 15 minute checks for safety cont per unit policy. Will cont to monitor for safety and provides support and reassurance as needed.       Problem: Depression  Goal: Will be euthymic at discharge  Description: INTERVENTIONS:  1. Administer medication as ordered  2. Provide emotional support via 1:1 interaction with staff  3. Encourage involvement in milieu/groups/activities  4. Monitor for social isolation  10/22/2024 2243 by Cecily Gordon, RN  Outcome: Progressing     Problem: Anxiety  Goal: Will report anxiety at manageable levels  Description: INTERVENTIONS:  1. Administer medication as ordered  2. Teach and rehearse alternative coping skills  3. Provide emotional support with 1:1 interaction with staff  10/22/2024 2243 by Cecily Gordon, RN  Outcome: Progressing

## 2024-10-23 NOTE — DISCHARGE INSTR - DIET

## 2024-10-25 NOTE — PROGRESS NOTES
Behavioral Services  Medicare Certification Upon Admission    I certify that this patient's inpatient psychiatric hospital admission is medically necessary for:    [x] (1) Treatment which could reasonably be expected to improve this patient's condition,       [x] (2) Or for diagnostic study;     AND     [x](2) The inpatient psychiatric services are provided while the individual is under the care of a physician and are included in the individualized plan of care.    Estimated length of stay/service 2-9 days    Plan for post-hospital care -outpatient care    Electronically signed by MARY JO PARDO MD on 10/18/2024 at 9:04 PM      
    [facility] Internal Medicine  Johnathan Rosario MD; Otis Acosta MD, Yahir Valenzuela MD, Anne Segal MD, Krista Melgar MD; Rylie Garcia MD    [facility] Internal Medicine   IN-PATIENT SERVICE   [facility]     HISTORY AND PHYSICAL EXAMINATION            Date:   10/19/2024  Patient name:  Yaritza Esquivel  Date of admission:  10/17/2024  8:14 PM  MRN:   [id number]  Account:  [id number]  YOB: 1975  PCP:    [facility] (Inactive)  Room:   [address] [place] [address]  Code Status:    Full Code      Chief Complaint:     Suicidal /Ac Psychosis    History Obtained From:     Patient/EMR/bedside RN     History of Present Illness:       Patient, has past medical history of multiple medical problem which includes major depression, history of strokes, history of M EN 2 a, has strong family history of M EN 2 a status post total thyroidectomy, status post parathyroidectomy, bilateral adrenalectomy with pheochromocytoma currently admitted at [facility] for further management of depression suicidal ideation.  Patient was initially admitted at medical floor,  Was out of medication not taking any medication currently, TSH was 189,  Patient was restarted on her medications, and transferred to [facility]  Patient currently complained of back pain, lumbar, going in her legs, will check x-ray give lidocaine patch    Past Medical History:   Diagnosis Date    Depression     Headache     Hypothyroidism     MEN (multiple endocrine neoplasia) (HCC)         Past Surgical History:     Past Surgical History:   Procedure Laterality Date    THYROIDECTOMY      TONSILLECTOMY      TOTAL HIP ARTHROPLASTY Right         Medications Prior to Admission:     Prior to Admission medications    Medication Sig Start Date End Date Taking? Authorizing Provider   amitriptyline (ELAVIL) 25 MG tablet Take 1 tablet by mouth nightly   Yes Provider, MD Hamida   vitamin D (CHOLECALCIFEROL) 125 MCG (5000 UT) 
    [facility] Internal Medicine  Johnathan Rosario MD; Otis Acosta MD, Yahir Valenzuela MD, Anne Segal MD, Krista Melgar MD; Rylie Garcia MD    [facility] Internal Medicine   IN-PATIENT SERVICE   [facility]     HISTORY AND PHYSICAL EXAMINATION            Date:   10/20/2024  Patient name:  Yaritza Esquivel  Date of admission:  10/17/2024  8:14 PM  MRN:   [id number]  Account:  [id number]  YOB: 1975  PCP:    [facility] (Inactive)  Room:   [address] [place] [address]  Code Status:    Full Code      Chief Complaint:     Suicidal /Ac Psychosis    History Obtained From:     Patient/EMR/bedside RN     History of Present Illness:       Patient, has past medical history of multiple medical problem which includes major depression, history of strokes, history of M EN 2 a, has strong family history of M EN 2 a status post total thyroidectomy, status post parathyroidectomy, bilateral adrenalectomy with pheochromocytoma currently admitted at [facility] for further management of depression suicidal ideation.  Patient was initially admitted at medical floor,  Was out of medication not taking any medication currently, TSH was 189,  Patient was restarted on her medications, and transferred to [facility]  Patient currently complained of back pain, lumbar, going in her legs, will check x-ray give lidocaine patch    Past Medical History:   Diagnosis Date    Depression     Headache     Hypothyroidism     MEN (multiple endocrine neoplasia) (HCC)         Past Surgical History:     Past Surgical History:   Procedure Laterality Date    THYROIDECTOMY      TONSILLECTOMY      TOTAL HIP ARTHROPLASTY Right         Medications Prior to Admission:     Prior to Admission medications    Medication Sig Start Date End Date Taking? Authorizing Provider   amitriptyline (ELAVIL) 25 MG tablet Take 1 tablet by mouth nightly   Yes Provider, MD Hamida   vitamin D (CHOLECALCIFEROL) 125 MCG (5000 UT) 
  Daily Progress Note  10/19/2024    Patient Name: Yaritza Esquivel    CHIEF COMPLAINT: Major depressive disorder, recurrent, severe without psychotic features         SUBJECTIVE:    Patient is seen today for a follow up assessment.  She is found lying in bed reading a book.  She describes her mood as \"blah\".  She denies significant improvement in depression or anxiety.  She admits to low mood, fatigue and poor sleep quality.  She endorses improvement in suicidal thoughts.  She reports having a good appetite.  Denies auditory or visual hallucinations.  Denies paranoia or delusions.  She is mostly isolated to her room.  Nursing denies behavioral disturbances or use of emergency medications.  Patient is compliant with taking scheduled psychotropic medications.  Patient been continued on home medications.  No medication changes made today.        Appetite:  [x] Adequate/Unchanged  [] Increased  [] Decreased      Sleep:       [] Adequate/Unchanged  [x] Fair  [] Poor      Group Attendance on Unit:   [] Yes   [] Selectively    [x] No    Compliant with scheduled medications: [x] Yes  [] No    Received emergency medications in past 24 hrs: [] Yes   [x] No    Medication Side Effects: Denies         Mental Status Exam  Level of consciousness: Alert and awake   Appearance: Appropriate attire for setting, resting in bed, with fair  grooming and hygiene   Behavior/Motor: Approachable, engages with interviewer, no psychomotor abnormalities   Attitude toward examiner: Cooperative, attentive, good eye contact  Speech: spontaneous, normal rate, and normal volume   Mood: \"Blah\"  Affect: Mood congruent  Thought processes: linear and coherent   Thought content:  Denies homicidal ideation  Suicidal Ideation: Reports improvement in suicidal ideations, contracts for safety on the unit.   Delusions: No evidence of delusions.   Perceptual Disturbance: Denies AVH.  Patient does not appear to be responding to internal stimuli.   Cognition: 
  Daily Progress Note  10/20/2024    Patient Name: Yaritza Esquivel    CHIEF COMPLAINT:  Major depressive disorder, recurrent, severe without psychotic features           SUBJECTIVE:    Patient is seen today for a follow up assessment.  She is found lying in bed awake.  She agrees to conduct interview a private room with door open.  She describes her mood as \"better\".  She endorses improvement in depression and anxiety.  She endorses improvement in suicidal ideation.  She denies hallucinations or paranoia.  She reports poor sleep quality overnight.  Admits to initial and middle insomnia.  She denies utilization of trazodone for insomnia.  She is requesting use of melatonin instead.  Nursing denies behavioral disturbances or use of emergency medications in the past 24 hours.  Patient is compliant with taking scheduled psychotropic medications.  No adverse effects reported.  She denies concerns for safety or privacy on the unit.  She remains isolative and spends most time reading in bed.  Risk and benefits discussed, we discontinue trazodone and start melatonin as needed for insomnia.    Appetite:  [x] Adequate/Unchanged  [] Increased  [] Decreased      Sleep:       [] Adequate/Unchanged  [] Fair  [x] Poor      Group Attendance on Unit:   [] Yes   [] Selectively    [x] No    Compliant with scheduled medications: [x] Yes  [] No    Received emergency medications in past 24 hrs: [] Yes   [x] No    Medication Side Effects: Denies         Mental Status Exam  Level of consciousness: Alert and awake   Appearance: Appropriate attire for setting, resting in bed, with fair  grooming and hygiene   Behavior/Motor: Approachable, engages with interviewer, no psychomotor abnormalities   Attitude toward examiner: Cooperative, attentive, good eye contact  Speech: spontaneous, normal rate, and normal volume   Mood: \"okay\"  Affect: Mood congruent  Thought processes: linear, goal directed, and coherent   Thought content:  Denies homicidal 
  Daily Progress Note  10/21/2024    Patient Name: Yaritza Esquivel    CHIEF COMPLAINT:  Depression with suicidal ideation          SUBJECTIVE:      Patient is seen today for a follow up assessment. Vitals and labs reviewed and appear within normal limits.  Yaritza is compliant with scheduled medications. She remains behaviorally in control and has not required emergency medications in the past 24 hours.  Yaritza is agreeable to assessment at bedside today.  She reports she has a migraine but she did take comfort medications for this.  Yaritza reports continued depression and anxiety rating both as a 7 (0-10 scale with 0 being none and 10 being the worst).  She reports that her sleep was fair last night.  She reports her appetite is poor.    Yaritza endorses fleeting suicidal ideation stating the thoughts continue to come and go throughout the day.  She denies homicidal ideation. She denies auditory and visual hallucinations. She denies paranoia.  She does continue to feel that she would be unsafe out of the hospital at this time secondary to continued suicidal thoughts.  She is unsure of where she is going at discharge.  She denies any medication side effects at this time.    Appetite:  [] Normal/Adequate/Unchanged  [] Increased  [x] Decreased      Sleep:       [] Normal/Adequate/Unchanged  [x] Fair  [] Poor      Group Attendance on Unit:   [] Yes  [] Selectively    [x] No    Medication Side Effects:  Patient denies any medication side effects at the time of assessment.         Mental Status Exam  Level of consciousness: Alert and awake.   Appearance: Appropriate attire for setting, resting in bed, with fair  grooming and hygiene.   Behavior/Motor: Approachable, calm  Attitude toward examiner: Cooperative, attentive, good eye contact.  Speech: Normal rate, normal volume, normal tone.  Mood:  Patient reports \"I have a migraine\".   Affect: Depressed  Thought processes: Linear and coherent.   Thought content: Denies 
10/25/24 pt at [facility] they typically use [facility] do not transfer medications to [facility] yet if transferring to [place] the one closest to [place] is located at [facility] [id number]     
CLINICAL PHARMACY NOTE: MEDS TO BEDS    Total # of Prescriptions Filled: 9   The following medications were delivered to the patient:  Amitriptyline HCL 25MG Tablets  Ferosul 325 (65FE)MG Tablets  Fludrocortisone Acetate 0.1MG Tablets  Fluoxetine HCL 20MG Capsules  Folic Acid 1MG Tablets  Levothyroxine Sodium 150MCG Tablets  Melatonin 3MG Tablets  Vitamin D3 125MCG Tablets  Vitamin B-12 1000MCG Tablets   Additional Documentation:  Delivered to [facility] and signed for by Christina at 12:41PM 10/32/24. Hydrocortisone tablets transferred to [facility] in [place] close to [facility].  
Nutrition Assessment     Type and Reason for Visit: Positive Nutrition Screen (wt loss, poor appetite)    Nutrition Recommendations/Plan:   Will continue to provide Regular diet     Malnutrition Assessment:  Malnutrition Status:  None    Nutrition Assessment:  Pt admitted to [facility] due to Suicidal ideation. She is eating more than 50% of food provided. Nausea resolved. Bed Scale wt suggests no recent wt loss.    Nutrition Related Findings:     Wound Type: None    Current Nutrition Therapies:    ADULT DIET; Regular    Anthropometric Measures:  Height: 190.5 cm (6' 3\")  Current Body Wt: 93.4 kg (206 lb) (10/17)   BMI: 25.7    Nutrition Diagnosis:   No nutrition diagnosis at this time     Nutrition Interventions:   Food and/or Nutrient Delivery: Continue Current Diet                 Nutrition Monitoring and Evaluation:      Food/Nutrient Intake Outcomes: Food and Nutrient Intake       Discharge Planning:    Continue current diet     Christine Lynch RD, LD  Contact: [phone]    
Pharmacy Medication History Note      List of current medications patient is taking is complete.    Source of information: [facility] Progressive Admission MAR (prior to I transfer), Med History completed on 10/16/24, Epic, PDMP    Changes made to medication list:  Medications removed (include reason, ex. therapy complete or physician discontinued, noncompliance):  Escitalopram (list clean up), Invega (list clean up), Medroxyprogesterone (list clean up)    Medications flagged for provider review:  none    Medications added/doses adjusted:  none      Current Home Medication List at Time of Admission:  Prior to Admission medications    Medication Sig   amitriptyline (ELAVIL) 25 MG tablet Take 1 tablet by mouth nightly   vitamin D (CHOLECALCIFEROL) 125 MCG (5000 UT) CAPS capsule Take 1 capsule by mouth daily   fludrocortisone (FLORINEF) 0.1 MG tablet Take 1 tablet by mouth daily   FLUoxetine (PROZAC) 20 MG capsule Take 3 capsules by mouth daily   folic acid (FOLVITE) 1 MG tablet Take 1 tablet by mouth daily   hydrocortisone (CORTEF) 10 MG tablet Take 20 mg (two tablets) in the morning and 15 mg (one and a half tablets) in the evening   ferrous sulfate (IRON 325) 325 (65 Fe) MG tablet Take 1 tablet by mouth in the morning and at bedtime   vitamin B-12 (CYANOCOBALAMIN) 1000 MCG tablet Take 1 tablet by mouth daily   omeprazole (PRILOSEC) 20 MG delayed release capsule Take 1 capsule by mouth daily   levothyroxine (SYNTHROID) 150 MCG tablet Take 1 tablet by mouth Daily   OLANZapine (ZYPREXA) 5 MG tablet Take 1 tablet by mouth every 6 hours as needed (agitation)   traZODone (DESYREL) 50 MG tablet Take 1 tablet by mouth nightly as needed for Sleep         Please let me know if you have any questions about this encounter. Thank you!    Electronically signed by Alistair Fish RPH on 10/18/2024 at 10:35 AM     
RT ASSESSMENT TREATMENT GOALS    [x]Pt Goal:  Pt will identify 1-2 positive coping skills by time of discharge.    []Pt Goal:  Pt will identify 1-2 positive aspects of self by time of discharge.    []Pt Goal:  Pt will remain on task/topic for 15-30 minutes during group by time of discharge.    []Pt Goal:  Pt will identify 1-2 aspects of relapse prevention plan by time of discharge.    [x]Pt Goal:  Pt will join in conversation with peers 1-2 times per group by time of discharge.    []Pt Goal:  Pt will identify 1-2 new leisure interests by time of discharge.    []Pt Goal:  Pt will not voice any delusional content by time of discharge.   
bed, with fair  grooming and hygiene.   Behavior/Motor: Approachable, slight psychomotor slowing.   Attitude toward examiner: Cooperative, calm, good eye contact.  Speech: Normal rate, normal volume, low tone.  Mood:  Patient reports \"I feel better than yesterday\".   Affect: Depressed  Thought processes: Linear and coherent.   Thought content: Denies homicidal ideation.  Suicidal Ideation: Fleeting suicidal ideations  Delusions: No evidence of delusions. Denies paranoia.  Perceptual Disturbance: Patient does not appear to be responding to internal stimuli. Denies auditory hallucinations. Denies visual hallucinations.   Cognition: Oriented to self, location, time, and situation.  Memory: Intact.  Insight & Judgement: Poor.     Data   height is 1.905 m (6' 3\") and weight is 84.8 kg (187 lb). Her temporal temperature is 98 °F (36.7 °C). Her blood pressure is 112/81 and her pulse is 64. Her respiration is 14 and oxygen saturation is 97%.   Labs:   Admission on 10/17/2024   Component Date Value Ref Range Status    POC Glucose 10/17/2024 115 (H)  65 - 105 mg/dL Final    Ventricular Rate 10/18/2024 64  BPM Final    Atrial Rate 10/18/2024 64  BPM Final    P-R Interval 10/18/2024 168  ms Final    QRS Duration 10/18/2024 68  ms Final    Q-T Interval 10/18/2024 384  ms Final    QTc Calculation ([place]) 10/18/2024 396  ms Final    P Axis 10/18/2024 78  degrees Final    R Axis 10/18/2024 -9  degrees Final    T Axis 10/18/2024 29  degrees Final         Reviewed patient's current plan of care and vital signs with nursing staff.    Labs reviewed: [x] Yes  Last EKG in EMR reviewed: [x] Yes  EKG done 10/18/24, Qtc 396    Medications  Current Facility-Administered Medications: melatonin tablet 3 mg, 3 mg, Oral, Nightly PRN  pantoprazole (PROTONIX) tablet 40 mg, 40 mg, Oral, QAM AC  amitriptyline (ELAVIL) tablet 25 mg, 25 mg, Oral, Nightly  ferrous sulfate (IRON 325) tablet 325 mg, 325 mg, Oral, BID WC  FLUoxetine (PROZAC) capsule 60

## 2025-03-21 ENCOUNTER — HOSPITAL ENCOUNTER (INPATIENT)
Age: 50
LOS: 11 days | Discharge: HOME | DRG: 751 | End: 2025-04-01
Attending: STUDENT IN AN ORGANIZED HEALTH CARE EDUCATION/TRAINING PROGRAM | Admitting: PSYCHIATRY & NEUROLOGY
Payer: COMMERCIAL

## 2025-03-21 DIAGNOSIS — R45.851 SUICIDAL IDEATION: Primary | ICD-10-CM

## 2025-03-21 LAB
ALBUMIN SERPL-MCNC: 4.3 G/DL (ref 3.5–5.2)
ALP SERPL-CCNC: 111 U/L (ref 35–104)
ALT SERPL-CCNC: 17 U/L (ref 10–35)
AMPHET UR QL SCN: NEGATIVE
ANION GAP SERPL CALCULATED.3IONS-SCNC: 13 MMOL/L (ref 9–16)
AST SERPL-CCNC: 23 U/L (ref 10–35)
BARBITURATES UR QL SCN: NEGATIVE
BASOPHILS # BLD: 0.1 K/UL (ref 0–0.2)
BASOPHILS NFR BLD: 1 % (ref 0–2)
BENZODIAZ UR QL: NEGATIVE
BILIRUB SERPL-MCNC: <0.2 MG/DL (ref 0–1.2)
BILIRUB UR QL STRIP: NEGATIVE
BUN SERPL-MCNC: 23 MG/DL (ref 6–20)
CALCIUM SERPL-MCNC: 10.3 MG/DL (ref 8.6–10.4)
CANNABINOIDS UR QL SCN: NEGATIVE
CHLORIDE SERPL-SCNC: 103 MMOL/L (ref 98–107)
CLARITY UR: CLEAR
CO2 SERPL-SCNC: 23 MMOL/L (ref 20–31)
COCAINE UR QL SCN: NEGATIVE
COLOR UR: YELLOW
COMMENT: NORMAL
CREAT SERPL-MCNC: 0.8 MG/DL (ref 0.7–1.2)
EOSINOPHIL # BLD: 0.3 K/UL (ref 0–0.4)
EOSINOPHILS RELATIVE PERCENT: 3 % (ref 0–4)
ERYTHROCYTE [DISTWIDTH] IN BLOOD BY AUTOMATED COUNT: 14.8 % (ref 11.5–14.9)
ETHANOL PERCENT: 0 %
ETHANOLAMINE SERPL-MCNC: <10 MG/DL (ref 0–0.08)
FENTANYL UR QL: NEGATIVE
GFR, ESTIMATED: >90 ML/MIN/1.73M2
GLUCOSE SERPL-MCNC: 88 MG/DL (ref 74–99)
GLUCOSE UR STRIP-MCNC: NEGATIVE MG/DL
HCT VFR BLD AUTO: 39.8 % (ref 36–46)
HGB BLD-MCNC: 13.2 G/DL (ref 12–16)
HGB UR QL STRIP.AUTO: NEGATIVE
KETONES UR STRIP-MCNC: NEGATIVE MG/DL
LEUKOCYTE ESTERASE UR QL STRIP: NEGATIVE
LYMPHOCYTES NFR BLD: 3.1 K/UL (ref 1–4.8)
LYMPHOCYTES RELATIVE PERCENT: 30 % (ref 24–44)
MCH RBC QN AUTO: 30.2 PG (ref 26–34)
MCHC RBC AUTO-ENTMCNC: 33.1 G/DL (ref 31–37)
MCV RBC AUTO: 91.1 FL (ref 80–100)
METHADONE UR QL: NEGATIVE
MONOCYTES NFR BLD: 0.6 K/UL (ref 0.1–1.3)
MONOCYTES NFR BLD: 6 % (ref 1–7)
NEUTROPHILS NFR BLD: 60 % (ref 36–66)
NEUTS SEG NFR BLD: 6.2 K/UL (ref 1.3–9.1)
NITRITE UR QL STRIP: NEGATIVE
OPIATES UR QL SCN: NEGATIVE
OXYCODONE UR QL SCN: NEGATIVE
PCP UR QL SCN: NEGATIVE
PH UR STRIP: 6 [PH] (ref 5–8)
PLATELET # BLD AUTO: 420 K/UL (ref 150–450)
PMV BLD AUTO: 7.2 FL (ref 6–12)
POTASSIUM SERPL-SCNC: 4 MMOL/L (ref 3.7–5.3)
PROT SERPL-MCNC: 7.5 G/DL (ref 6.6–8.7)
PROT UR STRIP-MCNC: NEGATIVE MG/DL
RBC # BLD AUTO: 4.37 M/UL (ref 4–5.2)
SODIUM SERPL-SCNC: 139 MMOL/L (ref 136–145)
SP GR UR STRIP: 1.03 (ref 1–1.03)
TEST INFORMATION: NORMAL
UROBILINOGEN UR STRIP-ACNC: NORMAL EU/DL (ref 0–1)
WBC OTHER # BLD: 10.3 K/UL (ref 3.5–11)

## 2025-03-21 PROCEDURE — 99285 EMERGENCY DEPT VISIT HI MDM: CPT

## 2025-03-21 PROCEDURE — 80307 DRUG TEST PRSMV CHEM ANLYZR: CPT

## 2025-03-21 PROCEDURE — 85025 COMPLETE CBC W/AUTO DIFF WBC: CPT

## 2025-03-21 PROCEDURE — 36415 COLL VENOUS BLD VENIPUNCTURE: CPT

## 2025-03-21 PROCEDURE — 80053 COMPREHEN METABOLIC PANEL: CPT

## 2025-03-21 PROCEDURE — G0480 DRUG TEST DEF 1-7 CLASSES: HCPCS

## 2025-03-21 PROCEDURE — 1240000000 HC EMOTIONAL WELLNESS R&B

## 2025-03-21 PROCEDURE — 81003 URINALYSIS AUTO W/O SCOPE: CPT

## 2025-03-21 ASSESSMENT — PAIN - FUNCTIONAL ASSESSMENT: PAIN_FUNCTIONAL_ASSESSMENT: NONE - DENIES PAIN

## 2025-03-22 PROBLEM — F43.10 PTSD (POST-TRAUMATIC STRESS DISORDER): Status: ACTIVE | Noted: 2025-03-22

## 2025-03-22 PROBLEM — R45.851 SUICIDAL IDEATION: Status: ACTIVE | Noted: 2025-03-22

## 2025-03-22 PROCEDURE — 99223 1ST HOSP IP/OBS HIGH 75: CPT | Performed by: NURSE PRACTITIONER

## 2025-03-22 PROCEDURE — 6370000000 HC RX 637 (ALT 250 FOR IP): Performed by: INTERNAL MEDICINE

## 2025-03-22 PROCEDURE — 6370000000 HC RX 637 (ALT 250 FOR IP): Performed by: NURSE PRACTITIONER

## 2025-03-22 PROCEDURE — 99223 1ST HOSP IP/OBS HIGH 75: CPT | Performed by: INTERNAL MEDICINE

## 2025-03-22 PROCEDURE — 1240000000 HC EMOTIONAL WELLNESS R&B

## 2025-03-22 RX ORDER — DIPHENHYDRAMINE HYDROCHLORIDE 50 MG/ML
50 INJECTION, SOLUTION INTRAMUSCULAR; INTRAVENOUS EVERY 6 HOURS PRN
Status: DISCONTINUED | OUTPATIENT
Start: 2025-03-22 | End: 2025-04-01 | Stop reason: HOSPADM

## 2025-03-22 RX ORDER — LEVOTHYROXINE SODIUM 150 UG/1
150 TABLET ORAL DAILY
Status: DISCONTINUED | OUTPATIENT
Start: 2025-03-22 | End: 2025-04-01 | Stop reason: HOSPADM

## 2025-03-22 RX ORDER — TRAZODONE HYDROCHLORIDE 50 MG/1
50 TABLET ORAL NIGHTLY PRN
Status: DISCONTINUED | OUTPATIENT
Start: 2025-03-22 | End: 2025-04-01 | Stop reason: HOSPADM

## 2025-03-22 RX ORDER — IBUPROFEN 400 MG/1
400 TABLET, FILM COATED ORAL EVERY 6 HOURS PRN
Status: DISCONTINUED | OUTPATIENT
Start: 2025-03-22 | End: 2025-04-01 | Stop reason: HOSPADM

## 2025-03-22 RX ORDER — HYDROXYZINE HYDROCHLORIDE 50 MG/1
50 TABLET, FILM COATED ORAL 3 TIMES DAILY PRN
Status: DISCONTINUED | OUTPATIENT
Start: 2025-03-22 | End: 2025-04-01 | Stop reason: HOSPADM

## 2025-03-22 RX ORDER — PANTOPRAZOLE SODIUM 40 MG/1
40 TABLET, DELAYED RELEASE ORAL
Status: DISCONTINUED | OUTPATIENT
Start: 2025-03-23 | End: 2025-04-01 | Stop reason: HOSPADM

## 2025-03-22 RX ORDER — FLUDROCORTISONE ACETATE 0.1 MG/1
0.1 TABLET ORAL DAILY
Status: DISCONTINUED | OUTPATIENT
Start: 2025-03-22 | End: 2025-04-01 | Stop reason: HOSPADM

## 2025-03-22 RX ORDER — SERTRALINE HYDROCHLORIDE 25 MG/1
25 TABLET, FILM COATED ORAL DAILY
Status: DISCONTINUED | OUTPATIENT
Start: 2025-03-22 | End: 2025-03-23

## 2025-03-22 RX ORDER — FERROUS SULFATE 325(65) MG
325 TABLET ORAL 2 TIMES DAILY
Status: DISCONTINUED | OUTPATIENT
Start: 2025-03-22 | End: 2025-04-01 | Stop reason: HOSPADM

## 2025-03-22 RX ORDER — HALOPERIDOL 5 MG/ML
5 INJECTION INTRAMUSCULAR EVERY 6 HOURS PRN
Status: DISCONTINUED | OUTPATIENT
Start: 2025-03-22 | End: 2025-04-01 | Stop reason: HOSPADM

## 2025-03-22 RX ORDER — LORAZEPAM 1 MG/1
2 TABLET ORAL EVERY 6 HOURS PRN
Status: DISCONTINUED | OUTPATIENT
Start: 2025-03-22 | End: 2025-04-01 | Stop reason: HOSPADM

## 2025-03-22 RX ORDER — HALOPERIDOL 5 MG/1
5 TABLET ORAL EVERY 6 HOURS PRN
Status: DISCONTINUED | OUTPATIENT
Start: 2025-03-22 | End: 2025-04-01 | Stop reason: HOSPADM

## 2025-03-22 RX ORDER — FOLIC ACID 1 MG/1
1 TABLET ORAL DAILY
Status: DISCONTINUED | OUTPATIENT
Start: 2025-03-22 | End: 2025-04-01 | Stop reason: HOSPADM

## 2025-03-22 RX ORDER — POLYETHYLENE GLYCOL 3350 17 G/17G
17 POWDER, FOR SOLUTION ORAL DAILY PRN
Status: DISCONTINUED | OUTPATIENT
Start: 2025-03-22 | End: 2025-04-01 | Stop reason: HOSPADM

## 2025-03-22 RX ORDER — PRAZOSIN HYDROCHLORIDE 1 MG/1
1 CAPSULE ORAL NIGHTLY
Status: DISCONTINUED | OUTPATIENT
Start: 2025-03-22 | End: 2025-03-29

## 2025-03-22 RX ORDER — MAGNESIUM HYDROXIDE/ALUMINUM HYDROXICE/SIMETHICONE 120; 1200; 1200 MG/30ML; MG/30ML; MG/30ML
30 SUSPENSION ORAL EVERY 6 HOURS PRN
Status: DISCONTINUED | OUTPATIENT
Start: 2025-03-22 | End: 2025-04-01 | Stop reason: HOSPADM

## 2025-03-22 RX ORDER — LORAZEPAM 2 MG/ML
2 INJECTION INTRAMUSCULAR EVERY 6 HOURS PRN
Status: DISCONTINUED | OUTPATIENT
Start: 2025-03-22 | End: 2025-04-01 | Stop reason: HOSPADM

## 2025-03-22 RX ORDER — POLYETHYLENE GLYCOL 3350 17 G
2 POWDER IN PACKET (EA) ORAL
Status: DISCONTINUED | OUTPATIENT
Start: 2025-03-22 | End: 2025-03-22

## 2025-03-22 RX ORDER — LANOLIN ALCOHOL/MO/W.PET/CERES
1000 CREAM (GRAM) TOPICAL DAILY
Status: DISCONTINUED | OUTPATIENT
Start: 2025-03-22 | End: 2025-04-01 | Stop reason: HOSPADM

## 2025-03-22 RX ORDER — ACETAMINOPHEN 325 MG/1
650 TABLET ORAL EVERY 4 HOURS PRN
Status: DISCONTINUED | OUTPATIENT
Start: 2025-03-22 | End: 2025-03-22

## 2025-03-22 RX ORDER — HYDROCORTISONE 10 MG/1
15 TABLET ORAL EVERY EVENING
Status: DISCONTINUED | OUTPATIENT
Start: 2025-03-22 | End: 2025-04-01 | Stop reason: HOSPADM

## 2025-03-22 RX ADMIN — FOLIC ACID 1 MG: 1 TABLET ORAL at 14:16

## 2025-03-22 RX ADMIN — HYDROCORTISONE 15 MG: 10 TABLET ORAL at 18:06

## 2025-03-22 RX ADMIN — FERROUS SULFATE TAB 325 MG (65 MG ELEMENTAL FE) 325 MG: 325 (65 FE) TAB at 14:15

## 2025-03-22 RX ADMIN — FLUDROCORTISONE ACETATE 0.1 MG: 0.1 TABLET ORAL at 16:23

## 2025-03-22 RX ADMIN — CYANOCOBALAMIN TAB 1000 MCG 1000 MCG: 1000 TAB at 14:15

## 2025-03-22 RX ADMIN — SERTRALINE 25 MG: 25 TABLET, FILM COATED ORAL at 14:15

## 2025-03-22 RX ADMIN — FERROUS SULFATE TAB 325 MG (65 MG ELEMENTAL FE) 325 MG: 325 (65 FE) TAB at 20:38

## 2025-03-22 RX ADMIN — LEVOTHYROXINE SODIUM 150 MCG: 0.15 TABLET ORAL at 16:24

## 2025-03-22 ASSESSMENT — SLEEP AND FATIGUE QUESTIONNAIRES
SLEEP PATTERN: DIFFICULTY FALLING ASLEEP;DISTURBED/INTERRUPTED SLEEP;RESTLESSNESS
DO YOU HAVE DIFFICULTY SLEEPING: YES
AVERAGE NUMBER OF SLEEP HOURS: 5
DO YOU USE A SLEEP AID: NO

## 2025-03-22 ASSESSMENT — PATIENT HEALTH QUESTIONNAIRE - PHQ9
SUM OF ALL RESPONSES TO PHQ QUESTIONS 1-9: 18
8. MOVING OR SPEAKING SO SLOWLY THAT OTHER PEOPLE COULD HAVE NOTICED. OR THE OPPOSITE, BEING SO FIGETY OR RESTLESS THAT YOU HAVE BEEN MOVING AROUND A LOT MORE THAN USUAL: MORE THAN HALF THE DAYS
6. FEELING BAD ABOUT YOURSELF - OR THAT YOU ARE A FAILURE OR HAVE LET YOURSELF OR YOUR FAMILY DOWN: MORE THAN HALF THE DAYS
5. POOR APPETITE OR OVEREATING: MORE THAN HALF THE DAYS
10. IF YOU CHECKED OFF ANY PROBLEMS, HOW DIFFICULT HAVE THESE PROBLEMS MADE IT FOR YOU TO DO YOUR WORK, TAKE CARE OF THINGS AT HOME, OR GET ALONG WITH OTHER PEOPLE: VERY DIFFICULT
4. FEELING TIRED OR HAVING LITTLE ENERGY: MORE THAN HALF THE DAYS
2. FEELING DOWN, DEPRESSED OR HOPELESS: MORE THAN HALF THE DAYS
3. TROUBLE FALLING OR STAYING ASLEEP: MORE THAN HALF THE DAYS
SUM OF ALL RESPONSES TO PHQ QUESTIONS 1-9: 18
7. TROUBLE CONCENTRATING ON THINGS, SUCH AS READING THE NEWSPAPER OR WATCHING TELEVISION: MORE THAN HALF THE DAYS
SUM OF ALL RESPONSES TO PHQ QUESTIONS 1-9: 16
1. LITTLE INTEREST OR PLEASURE IN DOING THINGS: MORE THAN HALF THE DAYS
9. THOUGHTS THAT YOU WOULD BE BETTER OFF DEAD, OR OF HURTING YOURSELF: MORE THAN HALF THE DAYS
SUM OF ALL RESPONSES TO PHQ QUESTIONS 1-9: 18

## 2025-03-22 ASSESSMENT — PAIN SCALES - GENERAL: PAINLEVEL_OUTOF10: 0

## 2025-03-22 NOTE — BH NOTE
Patient has Celecoxib and Acetaminophen listed as allergies. Writer clarified with the patient if these were allergies or intolerances.Patient states she cannot take tylenol but tolerates ibuprofen with no complications.

## 2025-03-22 NOTE — BH NOTE
[facility]  Admission Note     Admission Type:   Voluntary     Reason for admission:  Reason for Admission: Patient admitted for depression with suicidal ideation with a plan to run into traffic or jump off the bridge. Patient reports homicidal ideation toward a peer staying at [facility] and reports wanting to \"curb stomp\" them.      Addictive Behavior:   Addictive Behavior  In the Past 3 Months, Have You Felt or Has Someone Told You That You Have a Problem With  : None    Medical Problems:   Past Medical History:   Diagnosis Date    Adrenal insufficiency     Depression     Diabetes mellitus type 2, diet-controlled ([facility])     Headache     Hypothyroidism     MEN (multiple endocrine neoplasia) ([facility])        Status EXAM:  Mental Status and Behavioral Exam  Normal: No  Level of Assistance: Independent/Self  Facial Expression: Flat  Affect: Blunt  Level of Consciousness: Alert  Frequency of Checks: 4 times per hour, close  Mood:Normal: No  Mood: Depressed, Anxious, Helpless  Motor Activity:Normal: Yes  Eye Contact: Fair  Observed Behavior: Cooperative  Sexual Misconduct History: Current - no  Preception: [place] to person, [place] to time, [place] to place, [place] to situation  Attention:Normal: Yes  Thought Processes: Unremarkable  Thought Content:Normal: Yes  Depression Symptoms: Feelings of hopelessess, Feelings of helplessness, Increased irritability  Anxiety Symptoms: Generalized  Marni Symptoms: No problems reported or observed.  Hallucinations: None  Delusions: No  Memory:Normal: Yes  Insight and Judgment: No  Insight and Judgment: Poor insight, Poor judgment    Tobacco Screening:  Practical Counseling, on admission, ko X, if applicable and completed (first 3 are required if patient doesn't refuse):            ( ) Recognizing danger situations (included triggers and roadblocks)                    ( ) Coping skills (new ways to manage stress,relaxation techniques, changing routine, distraction)

## 2025-03-22 NOTE — ED NOTES
Provisional Diagnosis:   Depression with suicidal ideation     Psychosocial and Contextual Factors: Pt currently resides at [facility]. Pt has issues with social enviroment. Pt has issues with relationships.     C-SSRS Summary:    Patient: X    Family:     Agency: X (EPIC)    Present Suicidal Behavior:     Verbal: X    Attempt:     Past Suicidal Behavior:     Verbal: X    Attempt: X    Self- Injurious/ Self-Mutilation:  Pt denies    Trauma History: None reported at this time.    Protective Factors: Pt has insurance.     Risk Factors: Pt has poor judgement and coping skills.     Substance Abuse: Pt denies. - Pt has a history of alcohol and cocaine abuse.     Clinical Summary:  Yaritza Esquivel is a 49 year old female who presents to the ED via [email or web address]. Pt called 911 requesting help for care of suicidal and homicidal ideations. Pt transported to the ED on a voluntary status.    Pt is suicidal with a plan to walk out into traffic. Pt states pt has been having these suicidal thoughts for the past month. Pt reports pt is having homicidal thoughts towards specific people who pt wants to \"curb stomp.\" Pt denies AH/VH. Pt states pt has had previous suicide attempts. Pt has previous diagnosis of schizophrenia and bipolar depression. Pt is linked with [facility] and is med compliant. Pt was last admitted to the [facility] 10/17/24. Pt denies substance abuse.     Level of Care Disposition:.MANDI consulted with NICK Paez from psychiatry. Pt accepted for an inpatient admission to the [facility] for safety and stabilization.

## 2025-03-22 NOTE — H&P
negative for vision, hearing changes, runny nose, throat pain  RESPIRATORY:  negative for shortness of breath, cough, congestion, wheezing.  CARDIOVASCULAR:  negative for chest pain, palpitations.  GASTROINTESTINAL:  negative for nausea, vomiting, diarrhea, constipation, change in bowel habits, abdominal pain   GENITOURINARY:  negative for difficulty of urination, burning with urination, frequency   INTEGUMENT:  negative for rash, skin lesions, easy bruising   HEMATOLOGIC/LYMPHATIC:  negative for swelling/edema   ALLERGIC/IMMUNOLOGIC:  negative for urticaria , itching  ENDOCRINE:  negative increase in drinking, increase in urination, hot or cold intolerance  MUSCULOSKELETAL: Chronic back pain, bilateral hip pain  NEUROLOGICAL:  negative for headaches, dizziness, lightheadedness, numbness, pain, tingling extremities  BEHAVIOR/PSYCH:      Physical Exam:     /70   Pulse 70   Temp 98 °F (36.7 °C) (Temporal)   Resp 14   Ht 1.803 m (5' 11\")   Wt 93 kg (205 lb)   SpO2 98%   BMI 28.59 kg/m²   Temp (24hrs), Av °F (36.7 °C), Min:97.9 °F (36.6 °C), Max:98 °F (36.7 °C)    No results for input(s): \"POCGLU\" in the last 72 hours.  No intake or output data in the 24 hours ending 25 1519    General Appearance:  alert, well appearing, and in no acute distress, obesity present  Mental status: oriented to person, place, and time with normal affect  Head:  normocephalic, atraumatic.  Eye: no icterus, redness, pupils equal and reactive, extraocular eye movements intact, conjunctiva clear  Ear: normal external ear, no discharge, hearing intact  Nose:  no drainage noted  Mouth: mucous membranes moist  Neck: supple, no carotid bruits, thyroid not palpable  Lungs: Bilateral equal air entry, clear to ausculation, no wheezing, rales or rhonchi, normal effort  Cardiovascular: normal rate, regular rhythm, no murmur, gallop, rub.  Abdomen: Soft, nontender, nondistended, normal bowel sounds, no hepatomegaly or 
  Gastrointestinal: Negative for abdominal pain, diarrhea and vomiting.   Genitourinary: Negative for dysuria and urgency.   Musculoskeletal: Negative for falls and joint pain.   Skin: Negative for itching and rash.   Neurological: Negative for tremors, seizures and weakness.   Endo/Heme/Allergies: Does not bruise/bleed easily.      Mental Status Examination:    Level of consciousness: Awake and alert  Appearance:  Appropriate attire, resting in bed, fair grooming   Behavior/Motor: Approachable, engages with interviewer, no psychomotor abnormalities  Attitude toward examiner:  Cooperative, attentive, good eye contact  Speech: Normal rate, volume, and tone.  Mood: Depressed  Affect: Mood congruent  Thought processes:  Goal directed, linear  Thought content: Active suicidal ideations, with a  current plan or intent, unable to contract for safety off unit              Endorses thoughts to harm another person, but states she does not want to act on these thoughts.              Denies hallucinations              Denies delusions              Denies paranoia  Cognition:  Oriented to self, location, time, situation  Concentration: Clinically adequate  Memory: Intact  Insight &Judgment: Poor         DSM-5 Diagnosis    Principal Problem: Major depressive disorder, recurrent, severe, without psychosis     PTSD    Psychosocial and Contextual factors:  Financial X  Occupational   Relationship   Legal   Living situation X  Educational     Past Medical History:   Diagnosis Date    Adrenal insufficiency     Depression     Diabetes mellitus type 2, diet-controlled ([facility])     Headache     Hypothyroidism     MEN (multiple endocrine neoplasia) ([facility])         PATIENT HANDOFF:  Home medications reviewed.   Attempt to develop insight.  Medication management per attending  Monitor need and frequency of PRN medications.    CONSULT:  [x] Yes [] No  Internal medicine for medical management/medical H&P      Risk Management: close watch per

## 2025-03-22 NOTE — PLAN OF CARE
[facility]  Initial Interdisciplinary Treatment Plan NO      Original treatment plan Date & Time: 3/22/25 1245    Admission Type:  Admission Type: Voluntary    Reason for admission:   Reason for Admission: Patient admitted for depression with suicidal ideation with a plan to run into traffic or jump off the bridge. Patient reports homicidal ideation toward a peer staying at [facility] and reports wanting to \"curb stomp\" them.    Estimated Length of Stay:  5-7days  Estimated Discharge Date: to be determined by physician    PATIENT STRENGTHS:  Patient Strengths:   Patient Strengths and Limitations:   Addictive Behavior: Addictive Behavior  In the Past 3 Months, Have You Felt or Has Someone Told You That You Have a Problem With  : None  Medical Problems:  Past Medical History:   Diagnosis Date    Adrenal insufficiency     Depression     Diabetes mellitus type 2, diet-controlled ([facility])     Headache     Hypothyroidism     MEN (multiple endocrine neoplasia) ([facility])      Status EXAM:Mental Status and Behavioral Exam  Normal: No  Level of Assistance: Independent/Self  Facial Expression: Flat  Affect: Blunt  Level of Consciousness: Alert  Frequency of Checks: 4 times per hour, close  Mood:Normal: No  Mood: Depressed, Anxious, Helpless  Motor Activity:Normal: Yes  Eye Contact: Fair  Observed Behavior: Cooperative  Sexual Misconduct History: Current - no  Preception: [place] to person, [place] to time, [place] to place, [place] to situation  Attention:Normal: Yes  Thought Processes: Unremarkable  Thought Content:Normal: Yes  Depression Symptoms: Feelings of hopelessess, Feelings of helplessness, Increased irritability  Anxiety Symptoms: Generalized  Marni Symptoms: No problems reported or observed.  Hallucinations: None  Delusions: No  Memory:Normal: Yes  Insight and Judgment: No  Insight and Judgment: Poor insight, Poor judgment    EDUCATION:   Learner Progress Toward Treatment Goals: reviewed group plans and

## 2025-03-22 NOTE — CARE COORDINATION
[facility] Biopsychosocial Assessment    Current Level of Psychosocial Functioning     Independent XX  Dependent    Minimal Assist     Comments:    Psychosocial High Risk Factors (check all that apply)    Unable to obtain meds   Chronic illness/pain    Substance abuse   Lack of Family Support   Financial stress   Isolation   Inadequate Community Resources   Suicide attempt(s)   Not taking medications   Victim of crime   Developmental Delay  Unable to manage personal needs    Age 65 or older   Homeless XX  No transportation   Readmission within 30 days  Unemployment  Traumatic Event    Comments:   Psychiatric Advanced Directives: n/a    Family to Involve in Treatment: denies    Sexual Orientation: n/a     Patient Strengths: Patient has insurance, linked to [facility], seeking group home placement    Patient Barriers: Homelessness, lack of social support      Opiate Education Provided: n/a       CMHC/mental health history: Linked to [facility]    Plan of Care   medication management, group/individual therapies, family meetings, psycho -education, treatment team meetings to assist with stabilization    Initial Discharge Plan: Return to [facility] and continue established services with [facility]       Clinical Summary: Patient is a 49 year old female admitted to [facility] for suicidal ideation. Patient has a hx of admissions. Patient's last admission being 10/17/2024-10/. Patient states she has been staying at the [facility] and plans to return there at the time of discharge. Patient states she has a  at the [facility] who is helping her seek group home placement and she has toured several group homes. Patient has income and insurance. Patient denies having social support. Patient denies substance abuse of any type. Patient denies legal issues. Patient reports a hx of suicide attempts stating there are \"too many to count\". Patient denies suicidal ideation and homicidal ideation during social work

## 2025-03-22 NOTE — GROUP NOTE
Group Therapy Note    Date: 3/22/2025    Group Start Time: 1400  Group End Time: 1450  Group Topic: Cognitive Skills    [facility]    Faiza Villarreal CTRS        Group Therapy Note    Attendees: 5/12    Cognitive Skills Group Note        Date: March 22, 2025         Start Time: 2pm  End Time: 2:50pm      Number of Participants in Group & Unit Census:  5/12    Topic: interpersonal skills, decision-making, concentration     Goal of Group: To improve interpersonal skills and decision-making through collaborating with peers and concentrating on a presented task.       Comments:     Patient did not participate in Cognitive Skills group, despite staff encouragement and explanation of benefits.  Patient remain seclusive to self.  Q15 minute safety checks maintained for patient safety and will continue to encourage patient to attend unit programming.        Signature:  LEV Mares

## 2025-03-22 NOTE — GROUP NOTE
Group Therapy Note    Date: 3/22/2025    Group Start Time: 0900  Group End Time: 0930  Group Topic: Orientation Group    Charissa Sandoval LPN        Group Therapy Note    Attendees: 4/11       Patient's Goal:   Orientation/ Goal setting group.    Notes:   Patient didn't participate in the morning goal setting group despite staff invite to attend.  Patient preferred to sleep.    S

## 2025-03-22 NOTE — BH NOTE
Okay to discontinue suicide precautions per provider patient does not meet criteria. Patient agrees to remain safe on the unit. 15 minute rounds in place for patient safety.

## 2025-03-22 NOTE — ED PROVIDER NOTES
EMERGENCY DEPARTMENT ENCOUNTER    Pt Name: Yaritza Esquivel  MRN: [id number]  Birthdate 1975  Date of evaluation: 3/21/25  CHIEF COMPLAINT       Chief Complaint   Patient presents with    Suicidal    Homicidal     HISTORY OF PRESENT ILLNESS   49-year-old presenting with suicidal and homicidal ideation and suicidal plan to play in traffic or jump off the bridge    Homicidal wants to hurt somebody who she is reporting help steal her identity    Denies any auditory visual hallucinations    Denies drug or alcohol              REVIEW OF SYSTEMS     Review of Systems   Constitutional:  Negative for chills and fever.   HENT:  Negative for rhinorrhea and sore throat.    Eyes:  Negative for discharge and redness.   Respiratory:  Negative for shortness of breath.    Cardiovascular:  Negative for chest pain.   Gastrointestinal:  Negative for abdominal pain, diarrhea, nausea and vomiting.   Genitourinary:  Negative for dysuria, frequency and urgency.   Musculoskeletal:  Negative for arthralgias and myalgias.   Skin:  Negative for rash.   Neurological:  Negative for weakness and numbness.   Psychiatric/Behavioral:  Positive for behavioral problems and suicidal ideas.    All other systems reviewed and are negative.    PASTMEDICAL HISTORY     Past Medical History:   Diagnosis Date    Depression     Headache     Hypothyroidism     MEN (multiple endocrine neoplasia) ([facility])      Past Problem List  Patient Active Problem List   Diagnosis Code    Hypothyroidism E03.9    Schizophrenia ([facility]) F20.9    Bipolar depression ([facility]) F31.9    Depression F32.A    Depression with suicidal ideation F32.A, R45.851    Major depressive disorder, recurrent, severe without psychotic behavior ([facility]) F33.2    Chronic midline low back pain without sciatica M54.50, G89.29    Scoliosis of lumbar spine M41.9     SURGICAL HISTORY       Past Surgical History:   Procedure Laterality Date    THYROIDECTOMY      TONSILLECTOMY      TOTAL HIP ARTHROPLASTY Right

## 2025-03-23 LAB
T4 FREE SERPL-MCNC: 0.5 NG/DL (ref 0.9–1.7)
TSH SERPL DL<=0.05 MIU/L-ACNC: 138 UIU/ML (ref 0.27–4.2)

## 2025-03-23 PROCEDURE — 84443 ASSAY THYROID STIM HORMONE: CPT

## 2025-03-23 PROCEDURE — 6370000000 HC RX 637 (ALT 250 FOR IP): Performed by: NURSE PRACTITIONER

## 2025-03-23 PROCEDURE — 36415 COLL VENOUS BLD VENIPUNCTURE: CPT

## 2025-03-23 PROCEDURE — 6370000000 HC RX 637 (ALT 250 FOR IP): Performed by: PSYCHIATRY & NEUROLOGY

## 2025-03-23 PROCEDURE — 6370000000 HC RX 637 (ALT 250 FOR IP): Performed by: INTERNAL MEDICINE

## 2025-03-23 PROCEDURE — 6370000000 HC RX 637 (ALT 250 FOR IP)

## 2025-03-23 PROCEDURE — 1240000000 HC EMOTIONAL WELLNESS R&B

## 2025-03-23 PROCEDURE — 99232 SBSQ HOSP IP/OBS MODERATE 35: CPT | Performed by: INTERNAL MEDICINE

## 2025-03-23 PROCEDURE — 84439 ASSAY OF FREE THYROXINE: CPT

## 2025-03-23 RX ORDER — VENLAFAXINE HYDROCHLORIDE 37.5 MG/1
37.5 CAPSULE, EXTENDED RELEASE ORAL
Status: DISCONTINUED | OUTPATIENT
Start: 2025-03-24 | End: 2025-03-24

## 2025-03-23 RX ADMIN — HYDROCORTISONE 15 MG: 10 TABLET ORAL at 18:27

## 2025-03-23 RX ADMIN — FOLIC ACID 1 MG: 1 TABLET ORAL at 08:45

## 2025-03-23 RX ADMIN — FLUDROCORTISONE ACETATE 0.1 MG: 0.1 TABLET ORAL at 08:46

## 2025-03-23 RX ADMIN — CYANOCOBALAMIN TAB 1000 MCG 1000 MCG: 1000 TAB at 08:45

## 2025-03-23 RX ADMIN — PRAZOSIN HYDROCHLORIDE 1 MG: 1 CAPSULE ORAL at 20:40

## 2025-03-23 RX ADMIN — HYDROXYZINE HYDROCHLORIDE 50 MG: 50 TABLET, FILM COATED ORAL at 20:40

## 2025-03-23 RX ADMIN — LEVOTHYROXINE SODIUM 150 MCG: 0.15 TABLET ORAL at 06:46

## 2025-03-23 RX ADMIN — FERROUS SULFATE TAB 325 MG (65 MG ELEMENTAL FE) 325 MG: 325 (65 FE) TAB at 20:40

## 2025-03-23 RX ADMIN — FERROUS SULFATE TAB 325 MG (65 MG ELEMENTAL FE) 325 MG: 325 (65 FE) TAB at 08:45

## 2025-03-23 RX ADMIN — SERTRALINE 25 MG: 25 TABLET, FILM COATED ORAL at 08:45

## 2025-03-23 RX ADMIN — PANTOPRAZOLE SODIUM 40 MG: 40 TABLET, DELAYED RELEASE ORAL at 08:45

## 2025-03-23 ASSESSMENT — PAIN SCALES - GENERAL: PAINLEVEL_OUTOF10: 3

## 2025-03-23 NOTE — GROUP NOTE
Group Therapy Note    Date: 3/23/2025    Group Start Time: 1430  Group End Time: 1515  Group Topic: Cognitive Skills    KASHIF BHMark Edwards        Group Therapy Note    Attendees: 4/15       Cognitive Skills Group Note        Date: 3/23/2025  Start Time: 1430  End Time: 1515      Number of Participants in Group & Unit Census:  4/15    Topic: afternoon coping skills group    Goal of Group: offer time for patients to discuss suitable coping skills      Comments:     Patient did not participate in Cognitive Skills group, despite staff encouragement and explanation of benefits.  Patient remain seclusive to self.  Q15 minute safety checks maintained for patient safety and will continue to encourage patient to attend unit programming.       Modes of Intervention: Support and Socialization      Discipline Responsible: Behavorial Health Tech      Signature:  Mark Powers

## 2025-03-23 NOTE — PLAN OF CARE
Problem: Self Harm/Suicidality  Goal: Will have no self-injury during hospital stay  Description: INTERVENTIONS:  1.  Ensure constant observer at bedside with Q15M safety checks  2.  Maintain a safe environment  3.  Secure patient belongings  4.  Ensure family/visitors adhere to safety recommendations  5.  Ensure safety tray has been added to patient's diet order  6.  Every shift and PRN: Re-assess suicidal risk via Frequent Screener    Outcome: Progressing     Problem: Depression  Goal: Will be euthymic at discharge  Description: INTERVENTIONS:  1. Administer medication as ordered  2. Provide emotional support via 1:1 interaction with staff  3. Encourage involvement in milieu/groups/activities  4. Monitor for social isolation  Outcome: Progressing,  Patient pleasant and cooperative. Patient need much encouragement with ADL's and group therapy. Patient reports depression with mild anxiety due to life stressors. Patient denies self harm. 100% meal and fluid intake. 15 MIN safety check.

## 2025-03-23 NOTE — BH NOTE
Patient didn't participate in the morning goal group despite staff invite to attend, Patient preferred to sleep

## 2025-03-23 NOTE — BH NOTE
Patient is scheduled to receive prazosin (minipress) 1 mg this evening. Patient's blood pressure is 98/69 with a heart rate of 109. Writer contacted on call internal medicine provider via perfect serve. Per provider this medication is to be held this evening and parameters were added to the MAR.

## 2025-03-23 NOTE — PLAN OF CARE
Problem: Self Harm/Suicidality  Goal: Will have no self-injury during hospital stay  Description: INTERVENTIONS:  1.  Ensure constant observer at bedside with Q15M safety checks  2.  Maintain a safe environment  3.  Secure patient belongings  4.  Ensure family/visitors adhere to safety recommendations  5.  Ensure safety tray has been added to patient's diet order  6.  Every shift and PRN: Re-assess suicidal risk via Frequent Screener    3/22/2025 2226 by Kendy Scales RN  Outcome: Progressing     Problem: Depression  Goal: Will be euthymic at discharge  Description: INTERVENTIONS:  1. Administer medication as ordered  2. Provide emotional support via 1:1 interaction with staff  3. Encourage involvement in milieu/groups/activities  4. Monitor for social isolation  3/22/2025 2226 by Kendy Scales RN  Outcome: Progressing     Patient alert and oriented. Patient remains free from self harm throughout this shift. Patient agrees to seek out staff if thoughts of self harm arise.  Patient endorses some symptoms of anxiety and depression but states they are improving at this time.  Patient is mostly isolative and sleeping for majority of the evening, coming out for a short period during snack time. Patient is medication compliant and behavior remains controlled at this time.  Safe environment provided. Q15 minute checks maintained.

## 2025-03-23 NOTE — GROUP NOTE
Patient a cooperative participant with Sunday Safety checks.  No contraband found at this time.

## 2025-03-23 NOTE — GROUP NOTE
Group Therapy Note    Date: 3/23/2025    Group Start Time: 1030  Group End Time: 1110  Group Topic: Psychoeducation    KASHIF BHKendy Dawn, LSW        Group Therapy Note    Attendees: 6/15       patient refused to attend psychoeducation group at 1030a after encouragement from staff.  1:1 talk time provided as alternative to group session

## 2025-03-24 PROCEDURE — 99231 SBSQ HOSP IP/OBS SF/LOW 25: CPT

## 2025-03-24 PROCEDURE — 6370000000 HC RX 637 (ALT 250 FOR IP): Performed by: INTERNAL MEDICINE

## 2025-03-24 PROCEDURE — 6370000000 HC RX 637 (ALT 250 FOR IP): Performed by: NURSE PRACTITIONER

## 2025-03-24 PROCEDURE — 1240000000 HC EMOTIONAL WELLNESS R&B

## 2025-03-24 PROCEDURE — APPSS30 APP SPLIT SHARED TIME 16-30 MINUTES

## 2025-03-24 PROCEDURE — 6370000000 HC RX 637 (ALT 250 FOR IP)

## 2025-03-24 PROCEDURE — 6370000000 HC RX 637 (ALT 250 FOR IP): Performed by: PSYCHIATRY & NEUROLOGY

## 2025-03-24 RX ORDER — VENLAFAXINE HYDROCHLORIDE 75 MG/1
75 CAPSULE, EXTENDED RELEASE ORAL
Status: DISCONTINUED | OUTPATIENT
Start: 2025-03-25 | End: 2025-03-26

## 2025-03-24 RX ADMIN — FOLIC ACID 1 MG: 1 TABLET ORAL at 09:29

## 2025-03-24 RX ADMIN — LEVOTHYROXINE SODIUM 150 MCG: 0.15 TABLET ORAL at 06:19

## 2025-03-24 RX ADMIN — HYDROCORTISONE 15 MG: 10 TABLET ORAL at 17:00

## 2025-03-24 RX ADMIN — FERROUS SULFATE TAB 325 MG (65 MG ELEMENTAL FE) 325 MG: 325 (65 FE) TAB at 20:53

## 2025-03-24 RX ADMIN — VENLAFAXINE HYDROCHLORIDE 37.5 MG: 37.5 CAPSULE, EXTENDED RELEASE ORAL at 09:29

## 2025-03-24 RX ADMIN — FERROUS SULFATE TAB 325 MG (65 MG ELEMENTAL FE) 325 MG: 325 (65 FE) TAB at 09:30

## 2025-03-24 RX ADMIN — PANTOPRAZOLE SODIUM 40 MG: 40 TABLET, DELAYED RELEASE ORAL at 09:30

## 2025-03-24 RX ADMIN — PRAZOSIN HYDROCHLORIDE 1 MG: 1 CAPSULE ORAL at 20:53

## 2025-03-24 RX ADMIN — FLUDROCORTISONE ACETATE 0.1 MG: 0.1 TABLET ORAL at 09:30

## 2025-03-24 RX ADMIN — CYANOCOBALAMIN TAB 1000 MCG 1000 MCG: 1000 TAB at 09:29

## 2025-03-24 NOTE — PLAN OF CARE
[facility]  Day 3 Interdisciplinary Treatment Plan NOTE    Review Date & Time: 3/24/2025   1245    Admission Type:   Admission Type: Voluntary    Reason for admission:  Reason for Admission: Patient admitted for depression with suicidal ideation with a plan to run into traffic or jump off the bridge. Patient reports homicidal ideation toward a peer staying at [facility] and reports wanting to \"curb stomp\" them.  Estimated Length of Stay: 5-7 days  Estimated Discharge Date Update: to be determined by physician    PATIENT STRENGTHS:  Patient Strengths    Patient Strengths and Limitations:Limitations: Multiple barriers to leisure interests, Difficult relationships / poor social skills, Tendency to isolate self, External locus of control, Difficulty problem solving/relies on others to help solve problems  Addictive Behavior:Addictive Behavior  In the Past 3 Months, Have You Felt or Has Someone Told You That You Have a Problem With  : None  Medical Problems:  Past Medical History:   Diagnosis Date    Adrenal insufficiency     Depression     Diabetes mellitus type 2, diet-controlled ([facility])     Headache     Hypothyroidism     MEN (multiple endocrine neoplasia) ([facility])        Risk:  Fall Risk   Bart Scale Bart Scale Score: 22  BVC    Change in scores no Changes to plan of Care no    Status EXAM:   Mental Status and Behavioral Exam  Normal: No  Level of Assistance: Independent/Self  Facial Expression: Flat, Worried  Affect: Appropriate  Level of Consciousness: Alert  Frequency of Checks: 4 times per hour, close  Mood:Normal: No  Mood: Depressed, Anxious  Motor Activity:Normal: Yes  Eye Contact: Good  Observed Behavior: Withdrawn, Cooperative  Sexual Misconduct History: Current - no  Preception: [place] to person, [place] to time, [place] to place, [place] to situation  Attention:Normal: No  Attention: Unable to concentrate  Thought Processes: Blocking  Thought Content:Normal: Yes  Depression Symptoms:

## 2025-03-24 NOTE — PLAN OF CARE
Problem: Self Harm/Suicidality  Goal: Will have no self-injury during hospital stay  Description: INTERVENTIONS:  1.  Ensure constant observer at bedside with Q15M safety checks  2.  Maintain a safe environment  3.  Secure patient belongings  4.  Ensure family/visitors adhere to safety recommendations  5.  Ensure safety tray has been added to patient's diet order  6.  Every shift and PRN: Re-assess suicidal risk via Frequent Screener    3/23/2025 2103 by Kendy Denton LPN  Note: Patient denies thoughts of self harm at this time. Patient agrees to seek out staff if they begin having thoughts of harming self or they need to talk. Q15min safety checks continue     Problem: Depression  Goal: Will be euthymic at discharge  Description: INTERVENTIONS:  1. Administer medication as ordered  2. Provide emotional support via 1:1 interaction with staff  3. Encourage involvement in milieu/groups/activities  4. Monitor for social isolation  3/23/2025 2103 by Kendy Denton LPN  Note: Patient denies suicidal thoughts and hallucinations. She reports depression and anxiety are \"pretty much the same\", and rates her depression 9/10 and her anxiety 7/10. She was mostly isolative but out for needs and calm. Q15min safety checks continue

## 2025-03-24 NOTE — GROUP NOTE
Cognitive Skills Group Note        Date: March 24, 2025 Start Time:  1330   End Time:  1400      Number of Participants in Group & Unit Census:  5/15      Topic: Coping Skills     Goal of Group:Identify positive coping skills based on random letters of the alphabet       Comments:     Patient did not participate in Cognitive Skills group, despite staff encouragement and explanation of benefits.  Patient remain seclusive to self.  Q15 minute safety checks maintained for patient safety and will continue to encourage patient to attend unit programming.

## 2025-03-24 NOTE — GROUP NOTE
Group Therapy Note    Date: 3/24/2025    Group Start Time: 1000  Group End Time: 1045  Group Topic: Psychoeducation    [facility] StepKendy Crouch, LSW        Group Therapy Note    Attendees: 5/15       patient refused to attend psychoeducation group at 10a after encouragement from staff.  1:1 talk time provided as alternative to group session

## 2025-03-24 NOTE — GROUP NOTE
Group Therapy Note    Date: 3/23/2025    Group Start Time: 2000  Group End Time: 2020  Group Topic: Topic Group    [facility]    Clint Koehler RN        Group Therapy Note    Attendees: 9    patient refused to attend ABCs of coping skills group at 8 pm after encouragement from staff.  1:1 talk time was offered but declined as alternative to group session          Signature:  Clint Koehler RN

## 2025-03-24 NOTE — GROUP NOTE
Group Therapy Note    Date: 3/23/2025    Group Start Time: 2020  Group End Time: 2035  Group Topic: Topic Group    [facility] StepClint Vences RN        Group Therapy Note    Attendees: 9   patient refused to attend discharge planning  group at 820 pm after encouragement from staff.  1:1 talk time was offered but declined as alternative to group session            Signature:  Clint Koehler RN

## 2025-03-24 NOTE — PLAN OF CARE
Problem: Self Harm/Suicidality  Goal: Will have no self-injury during hospital stay  Description: INTERVENTIONS:  1.  Ensure constant observer at bedside with Q15M safety checks  2.  Maintain a safe environment  3.  Secure patient belongings  4.  Ensure family/visitors adhere to safety recommendations  5.  Ensure safety tray has been added to patient's diet order  6.  Every shift and PRN: Re-assess suicidal risk via Frequent Screener    3/24/2025 1349 by Katlin Tony, RN  Outcome: Progressing     Problem: Depression  Goal: Will be euthymic at discharge  Description: INTERVENTIONS:  1. Administer medication as ordered  2. Provide emotional support via 1:1 interaction with staff  3. Encourage involvement in milieu/groups/activities  4. Monitor for social isolation  3/24/2025 1349 by Katlin Tony, RN  Outcome: Progressing    Patient endorses fleeting suicidal thoughts with no active plan. Patient verbalizes ability to contract for safety while inpatient. Patient denies thoughts to self harm and has remained free from self injury thus far.  Patient has been observed as mostly isolative to room and is aloof of peers when out.  Patient endorses increased feelings of tiredness and \"just over all feeling down\"   Patient compliant with medications thus far.  Patient encouraged to seek staff for any needs or concerns that may arise.  Safe environment maintained.  Safety checks in place q15 min per protocol and at irregular intervals.

## 2025-03-24 NOTE — GROUP NOTE
Group Therapy Note    Date: 3/24/2025    Group Start Time: 1105  Group End Time: 1200  Group Topic: Music Therapy    KASHIF Perez    Pa Hung    Music Therapy Group Note        Date: 3/24/2025   Start Time: 1105  End Time: 1200      Number of Participants in Group & Unit Census:  8/16    Topic: Patients shared songs to dedicate to important people in their lives, answering questions about these people/relationship based on their music/sharing as asked by this writer.     Goal of Group: Goals to reflect on relationships; Increase sense of community; Increase socialization; Demonstrate positive use of time; Increase self-expression;      Comments:     Patient did not participate in Music Therapy group, despite staff encouragement and explanation of benefits.  Patient remain seclusive to self.  Q15 minute safety checks maintained for patient safety and will continue to encourage patient to attend unit programming.

## 2025-03-25 PROCEDURE — 6370000000 HC RX 637 (ALT 250 FOR IP): Performed by: PSYCHIATRY & NEUROLOGY

## 2025-03-25 PROCEDURE — 6370000000 HC RX 637 (ALT 250 FOR IP): Performed by: NURSE PRACTITIONER

## 2025-03-25 PROCEDURE — 1240000000 HC EMOTIONAL WELLNESS R&B

## 2025-03-25 PROCEDURE — 6370000000 HC RX 637 (ALT 250 FOR IP): Performed by: INTERNAL MEDICINE

## 2025-03-25 PROCEDURE — 99231 SBSQ HOSP IP/OBS SF/LOW 25: CPT

## 2025-03-25 PROCEDURE — APPSS30 APP SPLIT SHARED TIME 16-30 MINUTES

## 2025-03-25 PROCEDURE — 6370000000 HC RX 637 (ALT 250 FOR IP)

## 2025-03-25 RX ADMIN — FERROUS SULFATE TAB 325 MG (65 MG ELEMENTAL FE) 325 MG: 325 (65 FE) TAB at 21:02

## 2025-03-25 RX ADMIN — PRAZOSIN HYDROCHLORIDE 1 MG: 1 CAPSULE ORAL at 21:02

## 2025-03-25 RX ADMIN — CYANOCOBALAMIN TAB 1000 MCG 1000 MCG: 1000 TAB at 08:13

## 2025-03-25 RX ADMIN — PANTOPRAZOLE SODIUM 40 MG: 40 TABLET, DELAYED RELEASE ORAL at 08:13

## 2025-03-25 RX ADMIN — LEVOTHYROXINE SODIUM 150 MCG: 0.15 TABLET ORAL at 06:35

## 2025-03-25 RX ADMIN — VENLAFAXINE HYDROCHLORIDE 75 MG: 75 CAPSULE, EXTENDED RELEASE ORAL at 08:13

## 2025-03-25 RX ADMIN — FERROUS SULFATE TAB 325 MG (65 MG ELEMENTAL FE) 325 MG: 325 (65 FE) TAB at 08:13

## 2025-03-25 RX ADMIN — FOLIC ACID 1 MG: 1 TABLET ORAL at 08:13

## 2025-03-25 RX ADMIN — FLUDROCORTISONE ACETATE 0.1 MG: 0.1 TABLET ORAL at 08:13

## 2025-03-25 RX ADMIN — HYDROCORTISONE 15 MG: 10 TABLET ORAL at 18:02

## 2025-03-25 NOTE — PLAN OF CARE
Problem: Self Harm/Suicidality  Goal: Will have no self-injury during hospital stay  Description: INTERVENTIONS:  1.  Ensure constant observer at bedside with Q15M safety checks  2.  Maintain a safe environment  3.  Secure patient belongings  4.  Ensure family/visitors adhere to safety recommendations  5.  Ensure safety tray has been added to patient's diet order  6.  Every shift and PRN: Re-assess suicidal risk via Frequent Screener    3/25/2025 1505 by Katlin Tony, RN  Outcome: Progressing     Problem: Depression  Goal: Will be euthymic at discharge  Description: INTERVENTIONS:  1. Administer medication as ordered  2. Provide emotional support via 1:1 interaction with staff  3. Encourage involvement in milieu/groups/activities  4. Monitor for social isolation  3/25/2025 1505 by Katlin Tony, RN  Outcome: Progressing     Patient endorses fleeting suicidal ideations this AM stating that she woke up without them, but as she was began her morning, the thoughts started to pop up.  Patient verbalizes ability to contract for safety while inpatient.   Patient has been observed as isolative to room and is aloof when in the milieu.  Patient reports anxiety and depression, stating that she is unsure if she feels any improvement.  While she did wake at 4am, she believes she feels more rested than yesterday.  Patient encouraged to seek staff for any needs or concerns that may arise.  Safe environment maintained.  Safety checks in place q15 min per protocol and at irregular intervals.

## 2025-03-25 NOTE — GROUP NOTE
Group Therapy Note    Date: 3/25/2025    Group Start Time: 1330  Group End Time: 1420  Group Topic: Music Therapy    KASHIF Perez    Pa Hung    Music Therapy Group Note        Date: 3/25/2025   Start Time: 1330  End Time: 1420      Number of Participants in Group & Unit Census:  5/12    Topic: Patients shared songs analyzing lyrics for themes, sharing advice based on music selections.     Goal of Group:Patient Goals to engage in peer support; Increase sense of community; Increase socialization; Demonstrate positive use of time; Increase rapport with staff      Comments:     Patient did not participate in Music Therapy group, despite staff encouragement and explanation of benefits.  Patient remain seclusive to self.  Q15 minute safety checks maintained for patient safety and will continue to encourage patient to attend unit programming.

## 2025-03-25 NOTE — GROUP NOTE
Group Therapy Note    Date: 3/25/2025    Group Start Time: 1000  Group End Time: 1045  Group Topic: Psychoeducation    [facility] StepKendy Crouch, LSW        Group Therapy Note    Attendees: 5/13       patient refused to attend psychoeducation group at 10a after encouragement from staff.  1:1 talk time provided as alternative to group session

## 2025-03-25 NOTE — PLAN OF CARE
Problem: Self Harm/Suicidality  Goal: Will have no self-injury during hospital stay  Description: INTERVENTIONS:  1.  Ensure constant observer at bedside with Q15M safety checks  2.  Maintain a safe environment  3.  Secure patient belongings  4.  Ensure family/visitors adhere to safety recommendations  5.  Ensure safety tray has been added to patient's diet order  6.  Every shift and PRN: Re-assess suicidal risk via Frequent Screener    3/25/2025 0116 by Dominique Patricio LPN  Note: Patient reports some fleeting suicidal ideation with no current plan. Patient contracts for safety on the unit. Patient accepting of 1:1 talk time, support provided. 15 minute safety checks continue.     Problem: Depression  Goal: Will be euthymic at discharge  Description: INTERVENTIONS:  1. Administer medication as ordered  2. Provide emotional support via 1:1 interaction with staff  3. Encourage involvement in milieu/groups/activities  4. Monitor for social isolation  3/25/2025 0116 by Dominique Patricio LPN  Note: Patient endorses feeling depressed. Patient has been isolative to room this shift. Patient reports sleep and appetite have been adequate. Patient has been compliant with medications and care. Patient reports feeling hopeless about her living situation contributing to increased depression.

## 2025-03-26 PROCEDURE — APPSS30 APP SPLIT SHARED TIME 16-30 MINUTES: Performed by: NURSE PRACTITIONER

## 2025-03-26 PROCEDURE — 6370000000 HC RX 637 (ALT 250 FOR IP): Performed by: PSYCHIATRY & NEUROLOGY

## 2025-03-26 PROCEDURE — 6370000000 HC RX 637 (ALT 250 FOR IP)

## 2025-03-26 PROCEDURE — 6370000000 HC RX 637 (ALT 250 FOR IP): Performed by: NURSE PRACTITIONER

## 2025-03-26 PROCEDURE — 6370000000 HC RX 637 (ALT 250 FOR IP): Performed by: INTERNAL MEDICINE

## 2025-03-26 PROCEDURE — 1240000000 HC EMOTIONAL WELLNESS R&B

## 2025-03-26 RX ADMIN — FOLIC ACID 1 MG: 1 TABLET ORAL at 08:40

## 2025-03-26 RX ADMIN — VENLAFAXINE HYDROCHLORIDE 75 MG: 75 CAPSULE, EXTENDED RELEASE ORAL at 08:40

## 2025-03-26 RX ADMIN — HYDROXYZINE HYDROCHLORIDE 50 MG: 50 TABLET, FILM COATED ORAL at 21:05

## 2025-03-26 RX ADMIN — PANTOPRAZOLE SODIUM 40 MG: 40 TABLET, DELAYED RELEASE ORAL at 08:40

## 2025-03-26 RX ADMIN — LEVOTHYROXINE SODIUM 150 MCG: 0.15 TABLET ORAL at 06:35

## 2025-03-26 RX ADMIN — PRAZOSIN HYDROCHLORIDE 1 MG: 1 CAPSULE ORAL at 21:04

## 2025-03-26 RX ADMIN — CYANOCOBALAMIN TAB 1000 MCG 1000 MCG: 1000 TAB at 08:40

## 2025-03-26 RX ADMIN — FLUDROCORTISONE ACETATE 0.1 MG: 0.1 TABLET ORAL at 08:40

## 2025-03-26 RX ADMIN — FERROUS SULFATE TAB 325 MG (65 MG ELEMENTAL FE) 325 MG: 325 (65 FE) TAB at 21:05

## 2025-03-26 RX ADMIN — HYDROCORTISONE 15 MG: 10 TABLET ORAL at 21:04

## 2025-03-26 RX ADMIN — FERROUS SULFATE TAB 325 MG (65 MG ELEMENTAL FE) 325 MG: 325 (65 FE) TAB at 08:40

## 2025-03-26 NOTE — GROUP NOTE
Group Therapy Note    Date: 3/25/2025    Group Start Time: 2000  Group End Time: 2030  Group Topic: Relaxation    [facility] Hilary Boland, RN        Group Therapy Note    Attendees: 4/11       Patient refused to attend group at this time.

## 2025-03-26 NOTE — PLAN OF CARE
Problem: Self Harm/Suicidality  Goal: Will have no self-injury during hospital stay  Description: INTERVENTIONS:  1.  Ensure constant observer at bedside with Q15M safety checks  2.  Maintain a safe environment  3.  Secure patient belongings  4.  Ensure family/visitors adhere to safety recommendations  5.  Ensure safety tray has been added to patient's diet order  6.  Every shift and PRN: Re-assess suicidal risk via Frequent Screener    Outcome: Progressing  Note: Patient denies suicidal ideations at this time. Patient agrees to seek staffing with negative thoughts to harm self or others. @15 minute checks continues for safety.     Problem: Depression  Goal: Will be euthymic at discharge  Description: INTERVENTIONS:  1. Administer medication as ordered  2. Provide emotional support via 1:1 interaction with staff  3. Encourage involvement in milieu/groups/activities  4. Monitor for social isolation  Outcome: Progressing  Note: Patient denies thoughts to harm self or others. Patient reports anxiety at a manageable level, stating due to life stressors. Patient advised to seek staffing with increased signs of depression / anxiety.

## 2025-03-26 NOTE — PLAN OF CARE
Problem: Self Harm/Suicidality  Goal: Will have no self-injury during hospital stay  Description: INTERVENTIONS:  1.  Ensure constant observer at bedside with Q15M safety checks  2.  Maintain a safe environment  3.  Secure patient belongings  4.  Ensure family/visitors adhere to safety recommendations  5.  Ensure safety tray has been added to patient's diet order  6.  Every shift and PRN: Re-assess suicidal risk via Frequent Screener    3/25/2025 2349 by Hilary Menard, RN  Outcome: Progressing     Problem: Depression  Goal: Will be euthymic at discharge  Description: INTERVENTIONS:  1. Administer medication as ordered  2. Provide emotional support via 1:1 interaction with staff  3. Encourage involvement in milieu/groups/activities  4. Monitor for social isolation  3/25/2025 2349 by Hilary Menard, RN  Outcome: Progressing     Patient has been isolative to her room but out for needs. Patient reports improved anxiety and depression but still rate them a 9/10. Patient denies current suicidal idealizations but states the feeling comes and goes. Patient is medication compliant and behavior controlled, Q15 minute checks continued.

## 2025-03-26 NOTE — GROUP NOTE
Group Therapy Note    Date: 3/26/2025    Group Start Time: 1100  Group End Time: 1150  Group Topic: Music Therapy    KASHIF Perez    Abhilash Pa    Music Therapy Group Note        Date: 3/26/2025   Start Time: 1100  End Time: 1150      Number of Participants in Group & Unit Census:  6/11    Topic: Patient's asked to share songs they felt reflected positive things about themself/their lives, sharing those positive things about their life after their songs. Patients engaged in education on positive affirmations and asked to frame their positive qualities using the words \"I am...\" \"I can..\" \"I will... \" \"I have...\".     Goal of Group: Goals to increase self-expression; Increase self-esteem; Increase sense of community; Increase socialization; Demonstrate positive use of time      Comments:     Patient did not participate in Music Therapy group, despite staff encouragement and explanation of benefits.  Patient remain seclusive to self.  Q15 minute safety checks maintained for patient safety and will continue to encourage patient to attend unit programming.

## 2025-03-27 PROCEDURE — 6370000000 HC RX 637 (ALT 250 FOR IP): Performed by: INTERNAL MEDICINE

## 2025-03-27 PROCEDURE — 6370000000 HC RX 637 (ALT 250 FOR IP)

## 2025-03-27 PROCEDURE — 1240000000 HC EMOTIONAL WELLNESS R&B

## 2025-03-27 PROCEDURE — 6370000000 HC RX 637 (ALT 250 FOR IP): Performed by: NURSE PRACTITIONER

## 2025-03-27 PROCEDURE — 6370000000 HC RX 637 (ALT 250 FOR IP): Performed by: PSYCHIATRY & NEUROLOGY

## 2025-03-27 PROCEDURE — APPSS30 APP SPLIT SHARED TIME 16-30 MINUTES

## 2025-03-27 RX ADMIN — PRAZOSIN HYDROCHLORIDE 1 MG: 1 CAPSULE ORAL at 21:30

## 2025-03-27 RX ADMIN — VENLAFAXINE HYDROCHLORIDE 112.5 MG: 75 CAPSULE, EXTENDED RELEASE ORAL at 08:29

## 2025-03-27 RX ADMIN — FERROUS SULFATE TAB 325 MG (65 MG ELEMENTAL FE) 325 MG: 325 (65 FE) TAB at 08:29

## 2025-03-27 RX ADMIN — FERROUS SULFATE TAB 325 MG (65 MG ELEMENTAL FE) 325 MG: 325 (65 FE) TAB at 21:30

## 2025-03-27 RX ADMIN — LEVOTHYROXINE SODIUM 150 MCG: 0.15 TABLET ORAL at 06:40

## 2025-03-27 RX ADMIN — CYANOCOBALAMIN TAB 1000 MCG 1000 MCG: 1000 TAB at 08:29

## 2025-03-27 RX ADMIN — PANTOPRAZOLE SODIUM 40 MG: 40 TABLET, DELAYED RELEASE ORAL at 08:29

## 2025-03-27 RX ADMIN — FLUDROCORTISONE ACETATE 0.1 MG: 0.1 TABLET ORAL at 08:28

## 2025-03-27 RX ADMIN — HYDROCORTISONE 15 MG: 10 TABLET ORAL at 17:38

## 2025-03-27 RX ADMIN — FOLIC ACID 1 MG: 1 TABLET ORAL at 08:33

## 2025-03-27 RX ADMIN — IBUPROFEN 400 MG: 400 TABLET, FILM COATED ORAL at 21:30

## 2025-03-27 ASSESSMENT — PAIN DESCRIPTION - DESCRIPTORS: DESCRIPTORS: ACHING

## 2025-03-27 ASSESSMENT — PAIN - FUNCTIONAL ASSESSMENT: PAIN_FUNCTIONAL_ASSESSMENT: ACTIVITIES ARE NOT PREVENTED

## 2025-03-27 ASSESSMENT — PAIN SCALES - GENERAL: PAINLEVEL_OUTOF10: 4

## 2025-03-27 NOTE — PLAN OF CARE
Problem: Self Harm/Suicidality  Goal: Will have no self-injury during hospital stay  Description: INTERVENTIONS:  1.  Ensure constant observer at bedside with Q15M safety checks  2.  Maintain a safe environment  3.  Secure patient belongings  4.  Ensure family/visitors adhere to safety recommendations  5.  Ensure safety tray has been added to patient's diet order  6.  Every shift and PRN: Re-assess suicidal risk via Frequent Screener    3/26/2025 2247 by Kendy Scales RN  Outcome: Progressing     Problem: Depression  Goal: Will be euthymic at discharge  Description: INTERVENTIONS:  1. Administer medication as ordered  2. Provide emotional support via 1:1 interaction with staff  3. Encourage involvement in milieu/groups/activities  4. Monitor for social isolation  3/26/2025 2247 by Kendy Scales RN  Outcome: Progressing     Patient alert and oriented. Patient remains free from self harm throughout this shift. Patient agrees to seek out staff if thoughts of self harm arise.  Patient endorses some symptoms of anxiety and depression but states they are improving at this time.  Patient remains isolative to room this evening and reports feeling overly tired. Patient is medication compliant and behavior remains controlled at this time.  Safe environment provided. Q15 minute checks maintained.

## 2025-03-27 NOTE — PLAN OF CARE
Problem: Self Harm/Suicidality  Goal: Will have no self-injury during hospital stay  Description: INTERVENTIONS:  1.  Ensure constant observer at bedside with Q15M safety checks  2.  Maintain a safe environment  3.  Secure patient belongings  4.  Ensure family/visitors adhere to safety recommendations  5.  Ensure safety tray has been added to patient's diet order  6.  Every shift and PRN: Re-assess suicidal risk via Frequent Screener    Outcome: Progressing     Problem: Depression  Goal: Will be euthymic at discharge  Description: INTERVENTIONS:  1. Administer medication as ordered  2. Provide emotional support via 1:1 interaction with staff  3. Encourage involvement in milieu/groups/activities  4. Monitor for social isolation  Outcome: Progressing, Patient pleasant and cooperative. Patient encouraged to

## 2025-03-27 NOTE — GROUP NOTE
Group Therapy Note    Date: 3/26/2025    Group Start Time: 2000  Group End Time: 2030  Group Topic: Topic Group    [facility] StepClint Vences RN        Group Therapy Note    Attendees: 9       Patient's Goal:  safety group    Notes:  patient's educated on any contraband found in rooms and why it is considered contraband    Signature:  Clint Koehler RN

## 2025-03-27 NOTE — GROUP NOTE
Group Therapy Note    Date: 3/27/2025    Group Start Time: 1000  Group End Time: 1030  Group Topic: Psychoeducation    [facility] StepKendy Crouch, LSW        Group Therapy Note    Attendees: 6/12       patient refused to attend psychoeducation group at 10a after encouragement from staff.  1:1 talk time provided as alternative to group session

## 2025-03-27 NOTE — BH NOTE
Patient didn't participate in the morning Goals group despite staff invite to attend. Patient preferred to sleep.

## 2025-03-28 PROCEDURE — 6370000000 HC RX 637 (ALT 250 FOR IP): Performed by: NURSE PRACTITIONER

## 2025-03-28 PROCEDURE — 6370000000 HC RX 637 (ALT 250 FOR IP)

## 2025-03-28 PROCEDURE — 6370000000 HC RX 637 (ALT 250 FOR IP): Performed by: PSYCHIATRY & NEUROLOGY

## 2025-03-28 PROCEDURE — 1240000000 HC EMOTIONAL WELLNESS R&B

## 2025-03-28 PROCEDURE — 6370000000 HC RX 637 (ALT 250 FOR IP): Performed by: INTERNAL MEDICINE

## 2025-03-28 PROCEDURE — 99232 SBSQ HOSP IP/OBS MODERATE 35: CPT | Performed by: PSYCHIATRY & NEUROLOGY

## 2025-03-28 PROCEDURE — 90833 PSYTX W PT W E/M 30 MIN: CPT | Performed by: PSYCHIATRY & NEUROLOGY

## 2025-03-28 RX ORDER — FLUDROCORTISONE ACETATE 0.1 MG/1
0.1 TABLET ORAL DAILY
Qty: 30 TABLET | Refills: 0 | Status: SHIPPED | OUTPATIENT
Start: 2025-03-28

## 2025-03-28 RX ORDER — OMEPRAZOLE 20 MG/1
20 CAPSULE, DELAYED RELEASE ORAL DAILY
Qty: 30 CAPSULE | Refills: 0 | Status: SHIPPED | OUTPATIENT
Start: 2025-03-28

## 2025-03-28 RX ORDER — LEVOTHYROXINE SODIUM 150 UG/1
150 TABLET ORAL DAILY
Qty: 30 TABLET | Refills: 3 | Status: SHIPPED | OUTPATIENT
Start: 2025-03-29

## 2025-03-28 RX ORDER — LANOLIN ALCOHOL/MO/W.PET/CERES
1000 CREAM (GRAM) TOPICAL DAILY
Qty: 30 TABLET | Refills: 0 | Status: SHIPPED | OUTPATIENT
Start: 2025-03-28

## 2025-03-28 RX ORDER — PRAZOSIN HYDROCHLORIDE 1 MG/1
1 CAPSULE ORAL NIGHTLY
Qty: 30 CAPSULE | Refills: 0 | Status: SHIPPED | OUTPATIENT
Start: 2025-03-28 | End: 2025-03-31 | Stop reason: HOSPADM

## 2025-03-28 RX ORDER — HYDROCORTISONE 5 MG/1
15 TABLET ORAL EVERY EVENING
Qty: 90 TABLET | Refills: 0 | Status: SHIPPED | OUTPATIENT
Start: 2025-03-28

## 2025-03-28 RX ORDER — VENLAFAXINE HYDROCHLORIDE 37.5 MG/1
112.5 CAPSULE, EXTENDED RELEASE ORAL
Qty: 90 CAPSULE | Refills: 0 | Status: SHIPPED | OUTPATIENT
Start: 2025-03-29 | End: 2025-03-31 | Stop reason: HOSPADM

## 2025-03-28 RX ORDER — FOLIC ACID 1 MG/1
1 TABLET ORAL DAILY
Qty: 30 TABLET | Refills: 0 | Status: SHIPPED | OUTPATIENT
Start: 2025-03-28

## 2025-03-28 RX ORDER — FERROUS SULFATE 325(65) MG
325 TABLET ORAL 2 TIMES DAILY
Qty: 30 TABLET | Refills: 0 | Status: SHIPPED | OUTPATIENT
Start: 2025-03-28

## 2025-03-28 RX ADMIN — FERROUS SULFATE TAB 325 MG (65 MG ELEMENTAL FE) 325 MG: 325 (65 FE) TAB at 21:14

## 2025-03-28 RX ADMIN — VENLAFAXINE HYDROCHLORIDE 112.5 MG: 75 CAPSULE, EXTENDED RELEASE ORAL at 08:19

## 2025-03-28 RX ADMIN — CYANOCOBALAMIN TAB 1000 MCG 1000 MCG: 1000 TAB at 08:20

## 2025-03-28 RX ADMIN — PANTOPRAZOLE SODIUM 40 MG: 40 TABLET, DELAYED RELEASE ORAL at 08:19

## 2025-03-28 RX ADMIN — HYDROCORTISONE 15 MG: 10 TABLET ORAL at 17:30

## 2025-03-28 RX ADMIN — FERROUS SULFATE TAB 325 MG (65 MG ELEMENTAL FE) 325 MG: 325 (65 FE) TAB at 08:20

## 2025-03-28 RX ADMIN — IBUPROFEN 400 MG: 400 TABLET, FILM COATED ORAL at 08:19

## 2025-03-28 RX ADMIN — FLUDROCORTISONE ACETATE 0.1 MG: 0.1 TABLET ORAL at 08:20

## 2025-03-28 RX ADMIN — FOLIC ACID 1 MG: 1 TABLET ORAL at 08:20

## 2025-03-28 RX ADMIN — LEVOTHYROXINE SODIUM 150 MCG: 0.15 TABLET ORAL at 06:26

## 2025-03-28 RX ADMIN — HYDROXYZINE HYDROCHLORIDE 50 MG: 50 TABLET, FILM COATED ORAL at 21:14

## 2025-03-28 RX ADMIN — PRAZOSIN HYDROCHLORIDE 1 MG: 1 CAPSULE ORAL at 21:14

## 2025-03-28 ASSESSMENT — PAIN SCALES - GENERAL
PAINLEVEL_OUTOF10: 2
PAINLEVEL_OUTOF10: 3
PAINLEVEL_OUTOF10: 4

## 2025-03-28 ASSESSMENT — PAIN - FUNCTIONAL ASSESSMENT: PAIN_FUNCTIONAL_ASSESSMENT: ACTIVITIES ARE NOT PREVENTED

## 2025-03-28 NOTE — GROUP NOTE
Group Therapy Note     Date: 3/28/25     Group Start Time: 1100  Group End Time: 1130     Topic: Chat Pack           [facility] C     Attendees: 5/14     Patient did not participate in Unit Programming, despite staff encouragement and explanation of benefits.  Patient remain seclusive to self.  Q15 minute safety checks maintained for patient safety and will continue to encourage patient to attend unit programming.            Signature:  Jen Jamison LPN

## 2025-03-28 NOTE — GROUP NOTE
Group Therapy Note    Date: 3/28/2025    Group Start Time: 0900  Group End Time: 0930  Group Topic: Group Documentation    [facility] BHMark Edwards    Goal-Setting Group Note        Date: 3/28/2025  Start Time: 0900  End Time: 0930      Number of Participants in Group & Unit Census:  5/13    Topic: goal-setting     Goal of Group: patients can discuss ways to successfully plan goals      Comments:     Patient did not participate in Goal-Setting group, despite staff encouragement and explanation of benefits.  Patient remain seclusive to self.  Q15 minute safety checks maintained for patient safety and will continue to encourage patient to attend unit programming.     Modes of Intervention: Education, Support, and Socialization      Discipline Responsible: Behavorial Health Tech      Signature:  Mark Powers

## 2025-03-28 NOTE — GROUP NOTE
Group Therapy Note    Date: 3/28/2025    Group Start Time: 1000  Group End Time: 1025  Group Topic: Psychoeducation    [facility] StepKendy Crouch, LSW        Group Therapy Note    Attendees: 4/13       patient refused to attend psychoeducation group at 10a after encouragement from staff.  1:1 talk time provided as alternative to group session

## 2025-03-28 NOTE — PLAN OF CARE
Problem: Self Harm/Suicidality  Goal: Will have no self-injury during hospital stay  Description: INTERVENTIONS:  1.  Ensure constant observer at bedside with Q15M safety checks  2.  Maintain a safe environment  3.  Secure patient belongings  4.  Ensure family/visitors adhere to safety recommendations  5.  Ensure safety tray has been added to patient's diet order  6.  Every shift and PRN: Re-assess suicidal risk via Frequent Screener    3/28/2025 0927 by Kaden Munroe, RN  Outcome: Progressing     Problem: Depression  Goal: Will be euthymic at discharge  Description: INTERVENTIONS:  1. Administer medication as ordered  2. Provide emotional support via 1:1 interaction with staff  3. Encourage involvement in milieu/groups/activities  4. Monitor for social isolation  3/28/2025 0927 by Kaden Munroe, RN  Outcome: Progressing   Patient currently denies thoughts of self harm or suicidal ideation. Denies any hallucinations or delusions. Patient has slept well . Is eating well patient attends no  groups. Mood is isolative, cooperative. Safety checks continue every 15 minutes. Patient has remained safe. Will continue to support and monitor.

## 2025-03-28 NOTE — GROUP NOTE
Group Therapy Note    Date: 3/28/2025    Group Start Time: 0315  Group End Time: 1600  Group Topic: Community Meeting    [facility] Kaden Hernandez, RN        Group Therapy Note    Attendees: 4/13       Patient was offered group therapy today but declined to participate despite encouragement from staff.  1:1 was offered.

## 2025-03-28 NOTE — PLAN OF CARE
Problem: Self Harm/Suicidality  Goal: Will have no self-injury during hospital stay  Description: INTERVENTIONS:  1.  Ensure constant observer at bedside with Q15M safety checks  2.  Maintain a safe environment  3.  Secure patient belongings  4.  Ensure family/visitors adhere to safety recommendations  5.  Ensure safety tray has been added to patient's diet order  6.  Every shift and PRN: Re-assess suicidal risk via Frequent Screener    3/27/2025 2219 by Kendy Denton LPN  Note: Patient denies thoughts of self harm at this time. Patient agrees to seek out staff if they begin having thoughts of harming self or they need to talk. Q15min safety checks continue     Problem: Depression  Goal: Will be euthymic at discharge  Description: INTERVENTIONS:  1. Administer medication as ordered  2. Provide emotional support via 1:1 interaction with staff  3. Encourage involvement in milieu/groups/activities  4. Monitor for social isolation  3/27/2025 2219 by Kendy Denton LPN  Note: Patient denies suicidal thoughts and hallucinations. She reports mild depression and anxiety, she stated its improving slightly. She stated she was \"achy\", when writer suggested she come out in the dayroom for a bit or maybe walk around the unit to stretch out she stated \"I've done all that and it doesn't work\". She was isolative to her room all evening and only came out to get her night medication when encouraged by writer. Q15min safety checks continue

## 2025-03-28 NOTE — GROUP NOTE
Group Therapy Note    Date: 3/28/2025    Group Start Time: 1330  Group End Time: 1400  Group Topic: Activity    [facility]    Herringa, Aubreigh    Activity Group Note        Date: 3/28/2025  Start Time: 1330  End Time: 1400      Number of Participants in Group & Unit Census:  5/14    Topic: afternoon activity    Goal of Group:participation      Comments:     Patient did not participate in afternoon activity group, despite staff encouragement and explanation of benefits.  Patient remain seclusive to self.  Q15 minute safety checks maintained for patient safety and will continue to encourage patient to attend unit programming.       Modes of Intervention: Activity      Discipline Responsible: Behavorial Health Tech      Signature:  Mark Powers

## 2025-03-29 PROCEDURE — 6370000000 HC RX 637 (ALT 250 FOR IP): Performed by: PSYCHIATRY & NEUROLOGY

## 2025-03-29 PROCEDURE — 6370000000 HC RX 637 (ALT 250 FOR IP): Performed by: NURSE PRACTITIONER

## 2025-03-29 PROCEDURE — 6370000000 HC RX 637 (ALT 250 FOR IP): Performed by: INTERNAL MEDICINE

## 2025-03-29 PROCEDURE — 99232 SBSQ HOSP IP/OBS MODERATE 35: CPT | Performed by: PSYCHIATRY & NEUROLOGY

## 2025-03-29 PROCEDURE — 1240000000 HC EMOTIONAL WELLNESS R&B

## 2025-03-29 RX ORDER — PRAZOSIN HYDROCHLORIDE 1 MG/1
2 CAPSULE ORAL NIGHTLY
Status: DISCONTINUED | OUTPATIENT
Start: 2025-03-29 | End: 2025-04-01 | Stop reason: HOSPADM

## 2025-03-29 RX ORDER — VENLAFAXINE HYDROCHLORIDE 150 MG/1
150 CAPSULE, EXTENDED RELEASE ORAL
Status: DISCONTINUED | OUTPATIENT
Start: 2025-03-30 | End: 2025-04-01 | Stop reason: HOSPADM

## 2025-03-29 RX ORDER — RISPERIDONE 1 MG/1
1 TABLET, ORALLY DISINTEGRATING ORAL 2 TIMES DAILY
Status: DISCONTINUED | OUTPATIENT
Start: 2025-03-29 | End: 2025-04-01 | Stop reason: HOSPADM

## 2025-03-29 RX ADMIN — FERROUS SULFATE TAB 325 MG (65 MG ELEMENTAL FE) 325 MG: 325 (65 FE) TAB at 23:07

## 2025-03-29 RX ADMIN — FLUDROCORTISONE ACETATE 0.1 MG: 0.1 TABLET ORAL at 08:42

## 2025-03-29 RX ADMIN — VENLAFAXINE HYDROCHLORIDE 112.5 MG: 75 CAPSULE, EXTENDED RELEASE ORAL at 08:41

## 2025-03-29 RX ADMIN — FERROUS SULFATE TAB 325 MG (65 MG ELEMENTAL FE) 325 MG: 325 (65 FE) TAB at 08:42

## 2025-03-29 RX ADMIN — HYDROCORTISONE 15 MG: 10 TABLET ORAL at 18:31

## 2025-03-29 RX ADMIN — TRAZODONE HYDROCHLORIDE 50 MG: 50 TABLET ORAL at 23:07

## 2025-03-29 RX ADMIN — HYDROXYZINE HYDROCHLORIDE 50 MG: 50 TABLET, FILM COATED ORAL at 23:07

## 2025-03-29 RX ADMIN — PANTOPRAZOLE SODIUM 40 MG: 40 TABLET, DELAYED RELEASE ORAL at 06:34

## 2025-03-29 RX ADMIN — FOLIC ACID 1 MG: 1 TABLET ORAL at 08:42

## 2025-03-29 RX ADMIN — RISPERIDONE 1 MG: 1 TABLET, ORALLY DISINTEGRATING ORAL at 14:28

## 2025-03-29 RX ADMIN — RISPERIDONE 1 MG: 1 TABLET, ORALLY DISINTEGRATING ORAL at 23:07

## 2025-03-29 RX ADMIN — CYANOCOBALAMIN TAB 1000 MCG 1000 MCG: 1000 TAB at 08:42

## 2025-03-29 RX ADMIN — PRAZOSIN HYDROCHLORIDE 2 MG: 1 CAPSULE ORAL at 23:06

## 2025-03-29 RX ADMIN — LEVOTHYROXINE SODIUM 150 MCG: 0.15 TABLET ORAL at 06:37

## 2025-03-29 NOTE — PLAN OF CARE
Problem: Self Harm/Suicidality  Goal: Will have no self-injury during hospital stay  Description: INTERVENTIONS:  1.  Ensure constant observer at bedside with Q15M safety checks  2.  Maintain a safe environment  3.  Secure patient belongings  4.  Ensure family/visitors adhere to safety recommendations  5.  Ensure safety tray has been added to patient's diet order  6.  Every shift and PRN: Re-assess suicidal risk via Frequent Screener    Outcome: Progressing     Problem: Depression  Goal: Will be euthymic at discharge  Description: INTERVENTIONS:  1. Administer medication as ordered  2. Provide emotional support via 1:1 interaction with staff  3. Encourage involvement in milieu/groups/activities  4. Monitor for social isolation  Outcome: Progressing     Note: Patient denies suicidal ideation, homicidal ideation and hallucinations at this time.

## 2025-03-29 NOTE — GROUP NOTE
Group Therapy Note    Date: 3/29/2025    Group Start Time: 1000  Group End Time: 1030  Group Topic: Psychoeducation    [facility]    Hermelinda Villar MSW, H[place]        Group Therapy Note    Attendees: 4/12       Patient was offered group therapy today but declined to participate despite encouragement from staff.  1:1 was offered.       Signature:  SULEMA Olson LSW

## 2025-03-29 NOTE — CARE COORDINATION
Discharge Arrangements:      Guardian notified:N/A    Discharge destination/address:[facility] shelter  [address] [place] [address]    Transported by:  [employer] cab [phone]    Patient was not] accepting of referral. No noted substance use  Follow up appointment is scheduled for   [facility]  [address] [facility] [address]   4/1 @815am with Dr. Lennon   *RYLIE resources were offered to patient throughout admission and at time of discharge. This list of [facility] RYLIE providers was provided to patient:     [facility]  [address] [facility] [address]   [address] [facility] [address]  Phone: [phone]     Phone: [phone]    [facility]  [place]   [address] [address]   [address] [facility] [address]  Phone: [phone]     Phone: [phone]    [facility] [place], [facility]  [address] [facility] [address]    [address] [facility] [address]  Phone: [phone]     Phone: [phone]    [facility]     [facility]   [address] [place]   [address] [place], [place] [address] [address]  [place] [address]      Phone: [phone]  Phone: [phone]        [facility]  [address] [facility] [address]   [address] [facility] [address]  Phone: [phone]     Phone:  [phone]    [employer]      [facility], [facility]  [address] [facility] [address]   [address] [facility] [address]  Phone: [phone]     Phone: [phone]    [facility]  [address] [facility] [address]   [address] [facility] [address]  Phone: [phone]     Phone: [phone]    [facility]  [facility]  [address] [facility] [address] [address] [facility] [address]  Phone: [phone]     Phone: [phone]    Joseph

## 2025-03-29 NOTE — PLAN OF CARE
Problem: Self Harm/Suicidality  Goal: Will have no self-injury during hospital stay  Description: INTERVENTIONS:  1.  Ensure constant observer at bedside with Q15M safety checks  2.  Maintain a safe environment  3.  Secure patient belongings  4.  Ensure family/visitors adhere to safety recommendations  5.  Ensure safety tray has been added to patient's diet order  6.  Every shift and PRN: Re-assess suicidal risk via Frequent Screener    3/29/2025 1445 by Kami Cain LPN  Note: Patient reports suicial ideations with no plan but contrats for safety and encouraged to alert staff of any negative or overwhelming thoughts. Patient verbalized feeling a moderate amount of depression 9/10 and anxiety 6/10 that's not improving her mood or racing thoughts. Patient is behaviorally controlled and medication compliant as programming is encouraged and Q15 minute safeety checks continue to be conducted throughout shift.     Problem: Depression  Goal: Will be euthymic at discharge  Description: INTERVENTIONS:  1. Administer medication as ordered  2. Provide emotional support via 1:1 interaction with staff  3. Encourage involvement in milieu/groups/activities  4. Monitor for social isolation  3/29/2025 1445 by Kami Cain LPN  Outcome: Progressing

## 2025-03-30 PROCEDURE — 6370000000 HC RX 637 (ALT 250 FOR IP): Performed by: PSYCHIATRY & NEUROLOGY

## 2025-03-30 PROCEDURE — 99232 SBSQ HOSP IP/OBS MODERATE 35: CPT

## 2025-03-30 PROCEDURE — 6370000000 HC RX 637 (ALT 250 FOR IP): Performed by: NURSE PRACTITIONER

## 2025-03-30 PROCEDURE — 6370000000 HC RX 637 (ALT 250 FOR IP): Performed by: INTERNAL MEDICINE

## 2025-03-30 PROCEDURE — 1240000000 HC EMOTIONAL WELLNESS R&B

## 2025-03-30 RX ADMIN — FOLIC ACID 1 MG: 1 TABLET ORAL at 08:38

## 2025-03-30 RX ADMIN — HYDROXYZINE HYDROCHLORIDE 50 MG: 50 TABLET, FILM COATED ORAL at 21:30

## 2025-03-30 RX ADMIN — HYDROCORTISONE 15 MG: 10 TABLET ORAL at 17:56

## 2025-03-30 RX ADMIN — RISPERIDONE 1 MG: 1 TABLET, ORALLY DISINTEGRATING ORAL at 21:30

## 2025-03-30 RX ADMIN — FERROUS SULFATE TAB 325 MG (65 MG ELEMENTAL FE) 325 MG: 325 (65 FE) TAB at 08:39

## 2025-03-30 RX ADMIN — RISPERIDONE 1 MG: 1 TABLET, ORALLY DISINTEGRATING ORAL at 08:39

## 2025-03-30 RX ADMIN — CYANOCOBALAMIN TAB 1000 MCG 1000 MCG: 1000 TAB at 08:39

## 2025-03-30 RX ADMIN — LEVOTHYROXINE SODIUM 150 MCG: 0.15 TABLET ORAL at 07:05

## 2025-03-30 RX ADMIN — PRAZOSIN HYDROCHLORIDE 2 MG: 1 CAPSULE ORAL at 21:30

## 2025-03-30 RX ADMIN — PANTOPRAZOLE SODIUM 40 MG: 40 TABLET, DELAYED RELEASE ORAL at 07:05

## 2025-03-30 RX ADMIN — TRAZODONE HYDROCHLORIDE 50 MG: 50 TABLET ORAL at 21:30

## 2025-03-30 RX ADMIN — IBUPROFEN 400 MG: 400 TABLET, FILM COATED ORAL at 18:20

## 2025-03-30 RX ADMIN — VENLAFAXINE HYDROCHLORIDE 150 MG: 150 CAPSULE, EXTENDED RELEASE ORAL at 08:39

## 2025-03-30 RX ADMIN — FLUDROCORTISONE ACETATE 0.1 MG: 0.1 TABLET ORAL at 08:40

## 2025-03-30 RX ADMIN — FERROUS SULFATE TAB 325 MG (65 MG ELEMENTAL FE) 325 MG: 325 (65 FE) TAB at 21:30

## 2025-03-30 ASSESSMENT — PAIN DESCRIPTION - DESCRIPTORS: DESCRIPTORS: ACHING

## 2025-03-30 ASSESSMENT — PAIN - FUNCTIONAL ASSESSMENT: PAIN_FUNCTIONAL_ASSESSMENT: ACTIVITIES ARE NOT PREVENTED

## 2025-03-30 ASSESSMENT — PAIN DESCRIPTION - LOCATION: LOCATION: GENERALIZED

## 2025-03-30 ASSESSMENT — PAIN SCALES - GENERAL: PAINLEVEL_OUTOF10: 4

## 2025-03-30 NOTE — GROUP NOTE
Group Therapy Note    Date: 3/30/2025    Group Start Time: 1400  Group End Time: 1445  Group Topic: Recreational    KASHIF HuntPa Hunt    Recreation Group Note        Date: 3/30/2025   Start Time: 1400  End Time: 1445      Number of Participants in Group & Unit Census:  8/12    Topic: Patients shown examples of word clouds, and instructed to make one about themselves; writing their name in the center of a piece of paper and surrounding their name with positive qualities about themself/their life. Also able to share music they felt reflected something good about themselves. Engaged in reflection at the end of group, and able to share some of the words they chose about themselves.     Goal of Group: Goals to increase sense of community; Increase self-esteem; Increase self-expression; Demonstrate positive use of time;       Comments:     Patient did not participate in Recreation group, despite staff encouragement and explanation of benefits.  Patient remain seclusive to self.  Q15 minute safety checks maintained for patient safety and will continue to encourage patient to attend unit programming.

## 2025-03-30 NOTE — PLAN OF CARE
Problem: Self Harm/Suicidality  Goal: Will have no self-injury during hospital stay  Description: INTERVENTIONS:  1.  Ensure constant observer at bedside with Q15M safety checks  2.  Maintain a safe environment  3.  Secure patient belongings  4.  Ensure family/visitors adhere to safety recommendations  5.  Ensure safety tray has been added to patient's diet order  6.  Every shift and PRN: Re-assess suicidal risk via Frequent Screener    3/30/2025 1328 by Kami Cain LPN  Note: Patient verbalizes thoughts of fleeting suicidal ideations with no plan and contracts for safety. Also, she expresses feeling anxious and depressed with uncertainty as to whether or not her medications are effective. Patient is isolative and aloof among her peers with a flat affect. Safety checks continue to be conducted Q15 minutes and more frequent.     Problem: Depression  Goal: Will be euthymic at discharge  Description: INTERVENTIONS:  1. Administer medication as ordered  2. Provide emotional support via 1:1 interaction with staff  3. Encourage involvement in milieu/groups/activities  4. Monitor for social isolation  3/30/2025 1328 by Kami Cain LPN  Outcome: Progressing

## 2025-03-31 VITALS
BODY MASS INDEX: 28.7 KG/M2 | TEMPERATURE: 96.9 F | HEIGHT: 71 IN | DIASTOLIC BLOOD PRESSURE: 85 MMHG | WEIGHT: 205 LBS | OXYGEN SATURATION: 96 % | HEART RATE: 86 BPM | RESPIRATION RATE: 16 BRPM | SYSTOLIC BLOOD PRESSURE: 105 MMHG

## 2025-03-31 PROCEDURE — 6370000000 HC RX 637 (ALT 250 FOR IP): Performed by: PSYCHIATRY & NEUROLOGY

## 2025-03-31 PROCEDURE — 6370000000 HC RX 637 (ALT 250 FOR IP): Performed by: NURSE PRACTITIONER

## 2025-03-31 PROCEDURE — 99231 SBSQ HOSP IP/OBS SF/LOW 25: CPT | Performed by: INTERNAL MEDICINE

## 2025-03-31 PROCEDURE — APPSS30 APP SPLIT SHARED TIME 16-30 MINUTES

## 2025-03-31 PROCEDURE — 1240000000 HC EMOTIONAL WELLNESS R&B

## 2025-03-31 PROCEDURE — 6370000000 HC RX 637 (ALT 250 FOR IP): Performed by: INTERNAL MEDICINE

## 2025-03-31 RX ORDER — RISPERIDONE 1 MG/1
1 TABLET, ORALLY DISINTEGRATING ORAL 2 TIMES DAILY
Qty: 60 TABLET | Refills: 3 | Status: SHIPPED | OUTPATIENT
Start: 2025-03-31

## 2025-03-31 RX ORDER — VENLAFAXINE HYDROCHLORIDE 150 MG/1
150 CAPSULE, EXTENDED RELEASE ORAL
Qty: 30 CAPSULE | Refills: 0 | Status: SHIPPED | OUTPATIENT
Start: 2025-04-01

## 2025-03-31 RX ORDER — PRAZOSIN HYDROCHLORIDE 2 MG/1
2 CAPSULE ORAL NIGHTLY
Qty: 30 CAPSULE | Refills: 3 | Status: SHIPPED | OUTPATIENT
Start: 2025-03-31

## 2025-03-31 RX ADMIN — RISPERIDONE 1 MG: 1 TABLET, ORALLY DISINTEGRATING ORAL at 08:21

## 2025-03-31 RX ADMIN — IBUPROFEN 400 MG: 400 TABLET, FILM COATED ORAL at 08:22

## 2025-03-31 RX ADMIN — PANTOPRAZOLE SODIUM 40 MG: 40 TABLET, DELAYED RELEASE ORAL at 08:22

## 2025-03-31 RX ADMIN — FLUDROCORTISONE ACETATE 0.1 MG: 0.1 TABLET ORAL at 08:21

## 2025-03-31 RX ADMIN — FOLIC ACID 1 MG: 1 TABLET ORAL at 08:22

## 2025-03-31 RX ADMIN — FERROUS SULFATE TAB 325 MG (65 MG ELEMENTAL FE) 325 MG: 325 (65 FE) TAB at 21:16

## 2025-03-31 RX ADMIN — VENLAFAXINE HYDROCHLORIDE 150 MG: 150 CAPSULE, EXTENDED RELEASE ORAL at 08:22

## 2025-03-31 RX ADMIN — HYDROCORTISONE 15 MG: 10 TABLET ORAL at 18:24

## 2025-03-31 RX ADMIN — CYANOCOBALAMIN TAB 1000 MCG 1000 MCG: 1000 TAB at 08:22

## 2025-03-31 RX ADMIN — TRAZODONE HYDROCHLORIDE 50 MG: 50 TABLET ORAL at 21:16

## 2025-03-31 RX ADMIN — FERROUS SULFATE TAB 325 MG (65 MG ELEMENTAL FE) 325 MG: 325 (65 FE) TAB at 08:22

## 2025-03-31 RX ADMIN — HYDROXYZINE HYDROCHLORIDE 50 MG: 50 TABLET, FILM COATED ORAL at 21:16

## 2025-03-31 RX ADMIN — RISPERIDONE 1 MG: 1 TABLET, ORALLY DISINTEGRATING ORAL at 21:16

## 2025-03-31 RX ADMIN — PRAZOSIN HYDROCHLORIDE 2 MG: 1 CAPSULE ORAL at 21:16

## 2025-03-31 RX ADMIN — LEVOTHYROXINE SODIUM 150 MCG: 0.15 TABLET ORAL at 06:45

## 2025-03-31 ASSESSMENT — PAIN SCALES - GENERAL: PAINLEVEL_OUTOF10: 3

## 2025-03-31 NOTE — GROUP NOTE
Group Therapy Note    Date: 3/30/2025    Group Start Time: 2000  Group End Time: 2015  Group Topic: Discharge Planning    [facility] RN        Group Therapy Note    Attendees: 7/12       Patient's Goal:  Discharge planning      Status After Intervention:  Unchanged    Participation Level: Active Listener and Minimal    Participation Quality: Appropriate and Attentive      Speech:  mute      Thought Process/Content: KENNETH      Affective Functioning: Flat      Mood: anxious      Level of consciousness:  Alert, Oriented x4, and Preoccupied      Response to Learning: Able to retain information      Endings: None Reported    Modes of Intervention: Education, Support, and Activity      Discipline Responsible: Registered Nurse      Signature:  Jen Pleitez RN

## 2025-03-31 NOTE — GROUP NOTE
Group Therapy Note    Date: 3/31/2025    Group Start Time: 0900  Group End Time: 0924  Group Topic: Community Meeting    [facility]    Kaden Munroe RN        Group Therapy Note    Attendees: 5/13       Patient was offered group therapy today but declined to participate despite encouragement from staff.  1:1 was offered.         Signature:  Kaden Munroe RN

## 2025-03-31 NOTE — GROUP NOTE
Group Therapy Note    Date: 3/31/2025    Group Start Time: 1100  Group End Time: 1130  Group Topic: Healthy Living/Wellness    [facility]    Hiral Andersen        Group Therapy Note    Attendees: 9/13     Health/Wellness Group Note        Date: March 31, 2025 Start Time: 11am  End Time: 11:30am      Number of Participants in Group & Unit Census:  9/13    Topic: Coping skills    Goal of Group: Identify positive coping skills and support system      Comments:     Patient did not participate in Health/Wellness group, despite staff encouragement and explanation of benefits.  Patient remain seclusive to self.  Q15 minute safety checks maintained for patient safety and will continue to encourage patient to attend unit programming.

## 2025-03-31 NOTE — GROUP NOTE
Group Therapy Note    Date: 3/31/2025    Group Start Time: 1000  Group End Time: 1045  Group Topic: Psychoeducation    [facility], LSW        Group Therapy Note    Attendees: 7/13       patient refused to attend psychoeducation group at 10a after encouragement from staff.  1:1 talk time provided as alternative to group session

## 2025-03-31 NOTE — PLAN OF CARE
Problem: Self Harm/Suicidality  Goal: Will have no self-injury during hospital stay  Description: INTERVENTIONS:  1.  Ensure constant observer at bedside with Q15M safety checks  2.  Maintain a safe environment  3.  Secure patient belongings  4.  Ensure family/visitors adhere to safety recommendations  5.  Ensure safety tray has been added to patient's diet order  6.  Every shift and PRN: Re-assess suicidal risk via Frequent Screener    3/31/2025 0848 by Kaden Munroe, RN  Outcome: Progressing     Problem: Depression  Goal: Will be euthymic at discharge  Description: INTERVENTIONS:  1. Administer medication as ordered  2. Provide emotional support via 1:1 interaction with staff  3. Encourage involvement in milieu/groups/activities  4. Monitor for social isolation  3/31/2025 0848 by Kaden Munroe, RN  Outcome: Progressing   Patient currently denies thoughts of self harm or suicidal ideation. Denies any hallucinations or delusions. Patient has slept okay stating last night it was difficult to fall asleep . Is eating well patient attends no groups. Mood is isolative, reading books in her room and resting. Safety checks continue every 15 minutes. Patient has remained safe. Will continue to support and monitor.

## 2025-03-31 NOTE — PLAN OF CARE
Problem: Self Harm/Suicidality  Goal: Will have no self-injury during hospital stay  Description: INTERVENTIONS:  1.  Ensure constant observer at bedside with Q15M safety checks  2.  Maintain a safe environment  3.  Secure patient belongings  4.  Ensure family/visitors adhere to safety recommendations  5.  Ensure safety tray has been added to patient's diet order  6.  Every shift and PRN: Re-assess suicidal risk via Frequent Screener    Outcome: Progressing     Problem: Depression  Goal: Will be euthymic at discharge  Description: INTERVENTIONS:  1. Administer medication as ordered  2. Provide emotional support via 1:1 interaction with staff  3. Encourage involvement in milieu/groups/activities  4. Monitor for social isolation  Outcome: Progressing    Note: Pt denies suicidal ideations at this time. Pt agreed to seek staff at anytime he/she felt like any urges to harm self would arise. Safety checks maintained [id number].

## 2025-03-31 NOTE — PROGRESS NOTES
Behavioral Services  Medicare Certification Upon Admission    I certify that this patient's inpatient psychiatric hospital admission is medically necessary for:    [x] (1) Treatment which could reasonably be expected to improve this patient's condition,       [x] (2) Or for diagnostic study;     AND     [x](2) The inpatient psychiatric services are provided while the individual is under the care of a physician and are included in the individualized plan of care.    Estimated length of stay/service 4 to 7 days    Plan for post-hospital care home with outpatient community mental health follow-up    Electronically signed by SONYA BOYER MD on 3/23/2025 at 7:12 AM      
  Daily Progress Note  3/24/2025    Patient Name: Yaritza Esquivel    CHIEF COMPLAINT:   Suicidal and homicidal ideation          SUBJECTIVE:      Patient is seen today for a follow up assessment. Per nursing staff report, patient is withdrawn and isolative to her room.  Patient has been very fatigue and prefers to sleep. Patient' nurse said internal medicine believes that patient is sleeping too much, because of a steroid medication she was on.  Patient was agreeable to assessment in her room today.  She reports her mood as \"anxious\".  She rates anxiety as a 7/10 (with 0 being no anxiety and 10 being the worst) and depression as a 8/10 (with 0 being no depression and 10 being the worst).  She reports fleeting S.I. but denies having a plan.  She denies any issues with rigoberto, paranoia, or appetite.  She reports she is sleeping too much, and writer expressed doctor's concerns with patient per nurse's report.  Group participation encouraged.  She continues to require inpatient hospitalization for safety and stability.     Appetite:  [x] Normal/Adequate/Unchanged  [] Increased  [] Decreased      Sleep:       [] Normal/Adequate/Unchanged  [] Fair  [] Poor    Reports sleeping too much.    Group Attendance on Unit:   [] Yes  [] Selectively    [x] No    Medication Side Effects:  Patient denies any medication side effects at the time of assessment.         Mental Status Exam  Level of consciousness: Alert and awake.   Appearance: Appropriate attire for setting, resting in bed, with fair  grooming and hygiene.   Behavior/Motor: Approachable, cooperative.   Attitude toward examiner: Cooperative, attentive, good eye contact.  Speech: Normal rate, normal volume, normal tone.  Mood:  Patient reports \"anxious\".   Affect: anxious, depressed  Thought processes: Linear and coherent.   Thought content: Denies homicidal ideation.  Suicidal Ideation: Fleeting suicidal ideations  Delusions: No evidence of delusions. Denies 
  Daily Progress Note  3/25/2025    Patient Name: Yaritza Esquivel    CHIEF COMPLAINT:  Suicidal and homicidal ideation           SUBJECTIVE:    Patient is seen today for a follow up assessment. Patient resting in bed upon approach. Patient states that she is \"sleepy\" and \"depressed.\" She rates her depression a 9/10 and anxiety a 7/10. She reports fleeting suicidal thoughts with plan to jump off bridge or \"play\" on the highway. She reports that her suicidal ideation has not improved since admission. She reports improvement in homicidal ideation. She denies hallucinations. She continues to be at an increased risk of harm to self and does not yet demonstrate stability. She requires continued inpatient hospitalization for safety and stabilization.     Appetite:  [x] Adequate/Unchanged  [] Increased  [] Decreased      Sleep:       Reports sleeping excessively.     Group Attendance on Unit:   [] Yes   [] Selectively    [x] No    Compliant with scheduled medications: [x] Yes  [] No    Received emergency medications in past 24 hrs: [] Yes   [x] No    Medication Side Effects: Denies         Mental Status Exam  Level of consciousness: Alert and awake   Appearance: Appropriate attire for setting, resting in bed, with fair  grooming and hygiene   Behavior/Motor: Approachable, engages with interviewer, no psychomotor abnormalities   Attitude toward examiner: Cooperative, attentive, good eye contact  Speech: normal rate and normal volume   Mood:  \"Depressed\"  Affect: Mood congruent   Thought processes: linear, goal directed, and coherent   Thought content: Reports improvement in homicidal ideation  Suicidal Ideation: Fleeting suicidal ideations, contracts for safety on the unit.   Delusions: No evidence of delusions.   Perceptual Disturbance: Patient does not appear to be responding to internal stimuli.   Cognition: Oriented to self, location, time, and situation  Memory: intact  Insight: poor   Judgement: poor     Data   height is 
  Daily Progress Note  3/26/2025    Patient Name: Yaritza Esquivel    CHIEF COMPLAINT:  Suicidal and homicidal ideation           SUBJECTIVE:    Patient was seen for follow-up assessment today.  Vitals reviewed and within normal limits today.  No new blood work since admission.  Patient remains compliant with scheduled medications.  She has been behaviorally in control and has not required any emergency medications for agitation in the past 24 hours.  Nursing staff report patient is reporting better sleep.  She continues to report significant anxiety.  She remains isolative, coming out for meals and needs.  She was approached in her room where she was found to be resting in bed reading.  She was calm and cooperative with assessment.  Patient continues to report that her suicidal thoughts are \"fleeting\".  She continues to feel hopeless and helpless.  She stated she is no longer having homicidal thoughts towards the person at [facility].  She was asked to identify ways in which she has improved since hospitalization.  She is reporting improved focus and thoughts not as racing and she has been able to read for extended periods of time.  Thoughts and speech were logical and linear.  Patient reported that she woke up with an earache this morning and that has been bothering her today.  She also described her anxiety as being \"through the roof\".  She was encouraged to utilize as needed Atarax.  She was also encouraged to spend more time in the day area watching TV and socializing with peers.  At this time patient cannot contract for safety in the community and warrants further hospitalization for safety and stabilization.    Appetite:  [x] Adequate/Unchanged  [] Increased  [] Decreased      Sleep:       Reports sleeping excessively.     Group Attendance on Unit:   [] Yes   [] Selectively    [x] No    Compliant with scheduled medications: [x] Yes  [] No    Received emergency medications in past 24 hrs: [] Yes   [x] 
  Daily Progress Note  3/30/2025    Patient Name: Yaritza Esquivel    CHIEF COMPLAINT:  Depression with suicidal ideation          SUBJECTIVE:      Patient is seen today for a follow up assessment. Vitals and labs reviewed and appear within normal limits.  Yaritza is compliant with scheduled medications.  She remains behaviorally in control and has not required emergency medications in the past 24 hours.  Yaritza is agreeable to assessment at bedside.  She reports increased depression today and rates her depression as a 9 (0-10  scale with 0 being none and 10 being the worst).  She reports that she slept well last night but feels overly tired today.  She believes Risperdal is making her tired. This appears to have been started last night.  Yaritza denies  issues with her appetite.     She reports fleeting suicidal ideation stating the thoughts are coming and going throughout the day. She cannot contract for safety outside of the hospital at this time. She denies homicidal ideation.  She denies both auditory and visual hallucinations. She denies paranoia.  Other than increased fatigue she denies side effects to medications at this time.     Appetite:  [x] Normal/Adequate/Unchanged  [] Increased  [] Decreased      Sleep:       [x] Normal/Adequate/Unchanged  [] Fair  [] Poor      Group Attendance on Unit:   [] Yes  [] Selectively    [x] No    Medication Side Effects:  Patient denies any medication side effects at the time of assessment.         Mental Status Exam  Level of consciousness: Alert and awake.   Appearance: Appropriate attire for setting, resting in bed, with fair  grooming and hygiene.   Behavior/Motor: Approachable, calm  Attitude toward examiner: Cooperative, attentive, good eye contact.  Speech: Normal rate, normal volume, normal tone.  Mood:  Patient reports \"Anxious\".   Affect: Mood-congruent  Thought processes: Linear and coherent.   Thought content: Denies homicidal ideation.  Suicidal Ideation: Fleeting 
  Daily Progress Note  3/31/2025    Patient Name: Yaritza Esquivel    CHIEF COMPLAINT: Depression with suicidal ideation         SUBJECTIVE:    Patient is seen today for a follow up assessment. She agrees to conduct interview in private room with door open.  She is guarded but willing to engage in discussion. Describes mood as \"better\". She reports improvement in depression but continues to struggle with motivation and energy. She endorses fleeting suicidal ideation. She denies homicidal ideation. She denies hallucinations, paranoia, or delusions.  She expresses concerns of recent adjustment to Risperdal 1 mg twice daily due to history of tardive dyskinesia.  She endorses involuntary lip puckering and restlessness over the past two days. She reports sleeping 4 hours last night but admits to overall improvement of sleep quality. She reports attending evening yesterday.  She has not been  in groups today.  She is compliant with taking scheduled psychotropic medications.  She has not required emergency medications on the unit.  Medication management per attending.    Appetite:  [x] Adequate/Unchanged  [] Increased  [] Decreased      Sleep:       [] Adequate/Unchanged  [x] Improving [] Poor      Group Attendance on Unit:   [] Yes   [x] Selectively    [] No    Compliant with scheduled medications: [x] Yes  [] No    Received emergency medications in past 24 hrs: [] Yes   [x] No    Medication Side Effects: Reports lip puckering and restlessness over the past two days          Mental Status Exam  Level of consciousness: Alert and awake   Appearance: Appropriate attire for setting, resting in bed, with fair  grooming and hygiene   Behavior/Motor: Approachable, engages with interviewer, no psychomotor abnormalities   Attitude toward examiner: Cooperative, attentive, good eye contact  Speech: normal rate, normal volume, and normal tone  Mood: \"Better\"  Affect: Flat  Thought processes: linear and coherent   Thought 
CLINICAL PHARMACY NOTE: MEDS TO BEDS    Total # of Prescriptions Filled: 3   The following medications were delivered to the patient:  Venlafaxine HCL ER 150MG Capsules   Risperidone 1MG Tablets   Prazosin HCL 2MG Capsules    Additional Documentation:  Delivered to ZAYRA Alfonso RN at 3:24PM 3/31/25. Cancelled rxs retrieved, Prazosin 1MG and Venlafaxine 37.5MG, and brought back to pharmacy.   
CLINICAL PHARMACY NOTE: MEDS TO BEDS    Total # of Prescriptions Filled: 9   The following medications were delivered to the patient:  Venlafaxine ER 37.5mg capsule  Fludrocortisone 0.1mg  Omeprazole 20mg  Prazosin 1mg  Levothyroxine 150mcg  Hydrocortisone 5mg  Folic Acid 1mg  Ferosul 325(65FE)mg(Iron)  Vitamin B-12 1000mcg    Additional Documentation: 3/28/25 4:58pm kbg delivered to ZAYRA Gallo   
Daily Progress Note  David Avelar MD  3/23/2025  CHIEF COMPLAINT: Depression with suicidal ideation    Reviewed patient's current plan of care and vital signs with nursing staff.  Sleep:  several hours last night  Attending groups: No:     SUBJECTIVE:    Patient reporting continued struggles with suicidal ideation as well as homicidal ideation towards an individual she used to be friends with.  She is not on any antidepressant other than the Zoloft which she reports have not worked for her in the past.  She reports doing well with Pristiq in the past and after discussion of risk benefits and alternatives she is agreeable to start Effexor while here.  She is reporting poor sleep.  Reporting poor appetite depressed mood and suicidal ideation.  Low energy and low focus and concentration.  Reports feeling safe on the unit but unable to contract for safety and the community.  Spent time in supportive psychotherapy discussing conflict    Mental Status Exam  Level of consciousness:  Within normal limits  Appearance: Hospital attire, seated in chair, with poor grooming and hygiene   Behavior/Motor: No abnormalities noted  Attitude toward examiner:  Cooperative, attentive, fair eye contact  Speech:  spontaneous, normal rate, normal volume and well articulated  Mood: Depressed  Affect: Congruent with stated mood  Thought processes:  linear, goal directed and coherent  Thought content: Endorses homicidal ideation  Suicidal Ideation: Endorses suicidal ideation  Delusions:  no evidence of delusions  Perceptual Disturbance:  denies any perceptual disturbance  Cognition:  Oriented to self, location, time, and situation  Memory: age appropriate  Insight & Judgement: Poor  Medication side effects:  denies       Data   height is 1.803 m (5' 11\") and weight is 93 kg (205 lb). Her temporal temperature is 97.4 °F (36.3 °C). Her blood pressure is 108/93 (abnormal) and her pulse is 67. Her respiration is 15 and oxygen saturation is 95%. 
Daily Progress Note  David Avelar MD  3/28/2025  CHIEF COMPLAINT: Depression and suicidal ideation    Reviewed patient's current plan of care and vital signs with nursing staff.  Sleep:  several hours last night  Attending groups: Intermittently    SUBJECTIVE:    Patient is denying side effects to medication.  She is adamantly denying any homicidal ideation at this time.  Denies suicidal ideation intent or plan.  More forward-looking and constructive.  She reports her mood is better.  She is denying auditory visual hallucinations.  She does not appear to attend to internal stimuli.  Her thoughts are organized.  She is more forward-looking and constructive.  Spent time in supportive psychotherapy discussing stressors and difficulties in following up in outpatient community    Mental Status Exam  Level of consciousness:  Within normal limits  Appearance: Hospital attire, seated in chair, with good grooming and hygiene   Behavior/Motor: No abnormalities noted  Attitude toward examiner:  Cooperative, attentive, good eye contact  Speech:  spontaneous, normal rate, normal volume and well articulated  Mood: \"Good\"  Affect: Fair   Thought processes:  linear, goal directed and coherent  Thought content:  denies homicidal ideation  Suicidal Ideation: Denies suicidal ideation  Delusions:  no evidence of delusions  Perceptual Disturbance:  denies any perceptual disturbance  Cognition:  Oriented to self, location, time, and situation  Memory: age appropriate  Insight & Judgement: improving  Medication side effects:  denies       Data   height is 1.803 m (5' 11\") and weight is 93 kg (205 lb). Her temporal temperature is 97.8 °F (36.6 °C). Her blood pressure is 130/73 and her pulse is 67. Her respiration is 15 and oxygen saturation is 99%.   Labs:   Admission on 03/21/2025   Component Date Value Ref Range Status    WBC 03/21/2025 10.3  3.5 - 11.0 k/uL Final    RBC 03/21/2025 4.37  4.0 - 5.2 m/uL Final    Hemoglobin 03/21/2025 
Daily Progress Note  David Avelar MD  3/29/2025  CHIEF COMPLAINT: Depression with suicidal ideation    Reviewed patient's current plan of care and vital signs with nursing staff.  Sleep:  several hours last night  Attending groups: No:     SUBJECTIVE:    Patient was scheduled to be discharged today.  She is somewhat abrasive and abrupt with this author.  She is now claiming that she is suicidal and has been for days.  This is in direct contradiction to what she has been saying over the last several days.  She claims that this author has been waking her up from sleep and that is why she has not been able to tell me what is really going on.  I reviewed with the patient that in fact the past several days she has been awake and reading in bed.  It seems that little progress is to be made in developing insight to these disparities.  She is not claiming she is depressed and suicidal and unable to contract for safety.  We discussed further titration of her medication.  She is also stating that she is having nightmares.  Discussed we could increase her prazosin.  Will also augment treatment of her mood with atypical antipsychotic in the evening    Mental Status Exam  Level of consciousness:  Within normal limits  Appearance: Hospital attire, seated in chair, with good grooming and hygiene   Behavior/Motor: Abrasive  Attitude toward examiner: Accusatory and somewhat aggressive  Speech:  spontaneous, normal rate, normal volume and well articulated, irritable tone  Mood: Depressed  Affect: Withdrawn and irritable  Thought processes: Circumferential at times  Thought content:  denies homicidal ideation  Suicidal Ideation: Endorsing active suicidal ideation with inability to contract for safety  Delusions:  no evidence of delusions  Perceptual Disturbance:  denies any perceptual disturbance  Cognition:  Oriented to self, location, time, and situation  Memory: age appropriate  Insight & Judgement: Poor  Medication side effects: 
IN-PATIENT SERVICE  [facility] Internal Medicine    CONSULTATION / HISTORY AND PHYSICAL EXAMINATION            Date:   3/31/2025  Patient name:  Yaritza Esquivel  Date of admission:  3/21/2025  9:52 PM  MRN:   [id number]  Account:  [id number]  YOB: 1975  PCP:    Jimena, Unspecified (Inactive)  Room:   [address] [place] [address]  Code Status:    Full Code    Physician Requesting Consult: David Avelar MD    Reason for Consult:  medical management    Chief Complaint:     Chief Complaint   Patient presents with    Suicidal    Homicidal       History Obtained From:     Patient medical record nursing staff    History of Present Illness:   Patient, has past medical history of multiple medical problem patient with hypothyroidism, overweight, addisonian disease, family history of M EN 2 a syndrome, status post parathyroidectomy, bilateral adrenalectomy ,  bilateral hip replacement  Patient told me that she is not taking her medications for the last 2 weeks  Denying complaints of chest pain, shortness of breath    Past Medical History:     Past Medical History:   Diagnosis Date    Adrenal insufficiency     Depression     Diabetes mellitus type 2, diet-controlled ([facility])     Headache     Hypothyroidism     MEN (multiple endocrine neoplasia) ([facility])         Past Surgical History:     Past Surgical History:   Procedure Laterality Date    THYROIDECTOMY      TONSILLECTOMY      TOTAL HIP ARTHROPLASTY Right         Medications Prior to Admission:     Prior to Admission medications    Medication Sig Start Date End Date Taking? Authorizing Provider   prazosin (MINIPRESS) 2 MG capsule Take 1 capsule by mouth nightly 3/31/25  Yes David Avelar MD   risperiDONE (RISPERDAL M-TABS) 1 MG disintegrating tablet Take 1 tablet by mouth 2 times daily 3/31/25  Yes David Avelar MD   venlafaxine (EFFEXOR XR) 150 MG extended release capsule Take 1 capsule by mouth daily (with breakfast) 4/1/25  Yes David Avlear MD 
IN-PATIENT SERVICE  [facility] Internal Medicine    Progress Note            Date:   3/25/2025  Patient name:  Yaritza Esquivel  Date of admission:  3/21/2025  9:52 PM  MRN:   [id number]  Account:  [id number]  YOB: 1975  PCP:    Jimena, Unspecified (Inactive)  Room:   [address] [place] [address]  Code Status:    Full Code    Physician Requesting Consult: David Avelar MD    Reason for Consult:  medical management    Chief Complaint:     Chief Complaint   Patient presents with    Suicidal    Homicidal       History Obtained From:     Patient medical record nursing staff    History of Present Illness:   Patient, has past medical history of multiple medical problem patient with hypothyroidism, overweight, addisonian disease, family history of M EN 2 a syndrome, status post parathyroidectomy, bilateral adrenalectomy ,  bilateral hip replacement  Patient told me that she is not taking her medications for the last 2 weeks  Denying complaints of chest pain, shortness of breath    Past Medical History:     Past Medical History:   Diagnosis Date    Adrenal insufficiency     Depression     Diabetes mellitus type 2, diet-controlled (HCC)     Headache     Hypothyroidism     MEN (multiple endocrine neoplasia) (HCC)         Past Surgical History:     Past Surgical History:   Procedure Laterality Date    THYROIDECTOMY      TONSILLECTOMY      TOTAL HIP ARTHROPLASTY Right         Medications Prior to Admission:     Prior to Admission medications    Medication Sig Start Date End Date Taking? Authorizing Provider   amitriptyline (ELAVIL) 25 MG tablet Take 1 tablet by mouth nightly 10/23/24   Siddharth Cruz MD   ferrous sulfate (IRON 325) 325 (65 Fe) MG tablet Take 1 tablet by mouth in the morning and at bedtime 10/23/24   Siddharth Cruz MD   fludrocortisone (FLORINEF) 0.1 MG tablet Take 1 tablet by mouth daily 10/23/24   Siddharth Cruz MD   FLUoxetine (PROZAC) 20 MG capsule Take 3 capsules by mouth daily 10/23/24   
IN-PATIENT SERVICE  [facility] Internal Medicine    CONSULTATION / HISTORY AND PHYSICAL EXAMINATION            Date:   3/23/2025  Patient name:  Yaritza Esquivel  Date of admission:  3/21/2025  9:52 PM  MRN:   [id number]  Account:  [id number]  YOB: 1975  PCP:    Jimena, Unspecified (Inactive)  Room:   [address] [place] [address]  Code Status:    Full Code    Physician Requesting Consult: David Avelar MD    Reason for Consult:  medical management    Chief Complaint:     Chief Complaint   Patient presents with    Suicidal    Homicidal       History Obtained From:     Patient medical record nursing staff    History of Present Illness:   Patient, has past medical history of multiple medical problem patient with hypothyroidism, overweight, addisonian disease, family history of M EN 2 a syndrome, status post parathyroidectomy, bilateral adrenalectomy ,  bilateral hip replacement  Patient told me that she is not taking her medications for the last 2 weeks  Denying complaints of chest pain, shortness of breath    Past Medical History:     Past Medical History:   Diagnosis Date    Adrenal insufficiency     Depression     Diabetes mellitus type 2, diet-controlled (HCC)     Headache     Hypothyroidism     MEN (multiple endocrine neoplasia) (HCC)         Past Surgical History:     Past Surgical History:   Procedure Laterality Date    THYROIDECTOMY      TONSILLECTOMY      TOTAL HIP ARTHROPLASTY Right         Medications Prior to Admission:     Prior to Admission medications    Medication Sig Start Date End Date Taking? Authorizing Provider   amitriptyline (ELAVIL) 25 MG tablet Take 1 tablet by mouth nightly 10/23/24   Siddharth Cruz MD   ferrous sulfate (IRON 325) 325 (65 Fe) MG tablet Take 1 tablet by mouth in the morning and at bedtime 10/23/24   Siddharth Cruz MD   fludrocortisone (FLORINEF) 0.1 MG tablet Take 1 tablet by mouth daily 10/23/24   Siddharth Cruz MD   FLUoxetine (PROZAC) 20 MG capsule Take 3 
IN-PATIENT SERVICE  [facility] Internal Medicine    Progress Note            Date:   3/24/2025  Patient name:  Yaritza Esquivel  Date of admission:  3/21/2025  9:52 PM  MRN:   [id number]  Account:  [id number]  YOB: 1975  PCP:    Jimena, Unspecified (Inactive)  Room:   [address] [place] [address]  Code Status:    Full Code    Physician Requesting Consult: David Avelar MD    Reason for Consult:  medical management    Chief Complaint:     Chief Complaint   Patient presents with    Suicidal    Homicidal       History Obtained From:     Patient medical record nursing staff    History of Present Illness:   Patient, has past medical history of multiple medical problem patient with hypothyroidism, overweight, addisonian disease, family history of M EN 2 a syndrome, status post parathyroidectomy, bilateral adrenalectomy ,  bilateral hip replacement  Patient told me that she is not taking her medications for the last 2 weeks  Denying complaints of chest pain, shortness of breath    Past Medical History:     Past Medical History:   Diagnosis Date    Adrenal insufficiency     Depression     Diabetes mellitus type 2, diet-controlled (HCC)     Headache     Hypothyroidism     MEN (multiple endocrine neoplasia) (HCC)         Past Surgical History:     Past Surgical History:   Procedure Laterality Date    THYROIDECTOMY      TONSILLECTOMY      TOTAL HIP ARTHROPLASTY Right         Medications Prior to Admission:     Prior to Admission medications    Medication Sig Start Date End Date Taking? Authorizing Provider   amitriptyline (ELAVIL) 25 MG tablet Take 1 tablet by mouth nightly 10/23/24   Siddharth Cruz MD   ferrous sulfate (IRON 325) 325 (65 Fe) MG tablet Take 1 tablet by mouth in the morning and at bedtime 10/23/24   Siddharth Cruz MD   fludrocortisone (FLORINEF) 0.1 MG tablet Take 1 tablet by mouth daily 10/23/24   Siddharth Cruz MD   FLUoxetine (PROZAC) 20 MG capsule Take 3 capsules by mouth daily 10/23/24   
Patient is non smoker no smoking cessation needed at this time.   
RT ASSESSMENT TREATMENT GOALS    [x]Pt Goal:  Pt will identify 1-2 positive coping skills by time of discharge.    []Pt Goal:  Pt will identify 1-2 positive aspects of self by time of discharge.    []Pt Goal:  Pt will remain on task/topic for 15-30 minutes during group by time of discharge.    []Pt Goal:  Pt will identify 1-2 aspects of relapse prevention plan by time of discharge.    [x]Pt Goal:  Pt will join in conversation with peers 1-2 times per group by time of discharge.    []Pt Goal:  Pt will identify 1-2 new leisure interests by time of discharge.    []Pt Goal:  Pt will not voice any delusional content by time of discharge.   
Daily  hydrocortisone (CORTEF) tablet 15 mg, 15 mg, Oral, QPM  prazosin (MINIPRESS) capsule 1 mg, 1 mg, Oral, Nightly    ASSESSMENT  Major depressive disorder, recurrent, severe without psychotic behavior (HCC)         HANDOFF  Patient symptoms are: Remains Unstable.  Medications are reviewed per attending physician.  Monitor need and frequency of PRN medications.  Encourage participation in groups and milieu.  Probable discharge is to be determined by MD.     Electronically signed by EDGAR Gomez CNP on 3/27/2025 at 6:00 PM    **This report has been created using voice recognition software. It may contain minor errors which are inherent in voice recognition technology.**     I independently saw and evaluated the patient.  I reviewed the nurse practitioners documentation above. Principle diagnosis we are treating for is Major depressive disorder, recurrent, severe without psychotic behavior (HCC).  Any additional comments or changes to the nurse practitioners documentation are stated below otherwise agree with assessment. Spent 17 minutes with the patient in supportive psychotherapy.  Plan will be as follows:  Patient reports to this author and improvement in her mood and decreased suicidal ideation.  She is starting to feel safe outside of the hospital.  Denying auditory visual hallucinations.  Denying any homicidal ideation intent or plan.  Denying side effects to the increased Effexor.  Discussed possible discharge on Saturday and patient was in agreement    PLAN  Patient s symptoms   are improving  Continue with current medication for now  Attempt to develop insight  Psycho-education conducted.  Supportive Therapy conducted.  Probable discharge is Saturday with continued improvement  Follow-up daily while on inpatient unit  Electronically signed by SONYA BOYER MD on 3/27/2025 at 7:23 PM

## 2025-03-31 NOTE — GROUP NOTE
Psych-Ed/Relapse Prevention Group Note        Date: March 31, 2025 Start Time: 1:30pm  End Time:  2:00pm      Number of Participants in Group & Unit Census:  7/15    Topic: Socialization    Goal of Group:Patient will demonstrate improved interpersonal skills      Comments:     Patient did not participate in Psych-Ed/Relapse Prevention group, despite staff encouragement and explanation of benefits.  Patient remain seclusive to self.  Q15 minute safety checks maintained for patient safety and will continue to encourage patient to attend unit programming.         Signature:  ILYA RodriguezS

## 2025-03-31 NOTE — GROUP NOTE
Group Therapy Note    Date: 3/31/2025    Group Start Time: 1430  Group End Time: 1505  Group Topic: Psychoeducation    [facility] H[facility] Jaime        Group Therapy Note    Attendees: 8/15     Psych-Ed/Relapse Prevention Group Note        Date: March 31, 2025 Start Time: 2:30pm  End Time:  1505      Number of Participants in Group & Unit Census:  8/15    Topic: Coping skills    Goal of Group: list positive affirmations      Comments:     Patient did not participate in Psych-Ed/Relapse Prevention group, despite staff encouragement and explanation of benefits.  Patient remain seclusive to self.  Q15 minute safety checks maintained for patient safety and will continue to encourage patient to attend unit programming.

## 2025-03-31 NOTE — TRANSITION OF CARE
Psychiatric Advance Directive? No  If the patient does not have a surrogate or Medical Advance Directive AND Psychiatric Advance Directive, the patient was offered information on these advance directives Patient declined to complete    Patient Instructions: Please continue all medications until otherwise directed by physician.      Tobacco Cessation Discharge Plan:   Is the patient a tobacco user  and needs referral for tobacco cessation? No  Patient referred to the following for tobacco cessation with an appointment? No  Patient was offered medication to assist with tobacco cessation at discharge? No    Alcohol/Substance Abuse Discharge Plan:   Does the patient have a history of substance/alcohol abuse and requires a referral for treatment? No  Patient referred to the following for substance/alcohol abuse treatment with an appointment? No  Patient was offered medication to assist with substance/alcohol abuse cessation at discharge? No      Patient discharged to: Home; Transition record discussed with patient/caregiver and provided this record in hard copy or electronically

## 2025-04-01 PROCEDURE — 6370000000 HC RX 637 (ALT 250 FOR IP): Performed by: NURSE PRACTITIONER

## 2025-04-01 RX ADMIN — LEVOTHYROXINE SODIUM 150 MCG: 0.15 TABLET ORAL at 07:11

## 2025-04-01 NOTE — DISCHARGE SUMMARY
Provider Discharge Summary     Patient ID:  Yaritza Esquivel  [id number]  49 y.o.  1975    Admit date: 3/21/2025    Discharge date and time: 4/1/2025  5:40 PM     Admitting Physician: David Avelar MD     Discharge Physician: David Avelar MD    Admission Diagnoses: Suicidal ideation [R45.851]  Depression with suicidal ideation [F32.A, R45.851]    Discharge Diagnoses:      Major depressive disorder, recurrent, severe without psychotic behavior (HCC)     Patient Active Problem List   Diagnosis Code    Hypothyroidism E03.9    Schizophrenia F20.9    Bipolar depression (HCC) F31.9    Depression F32.A    Depression with suicidal ideation F32.A, R45.851    Major depressive disorder, recurrent, severe without psychotic behavior (HCC) F33.2    Chronic midline low back pain without sciatica M54.50, G89.29    Scoliosis of lumbar spine M41.9    PTSD (post-traumatic stress disorder) F43.10    Suicidal ideation R45.851        Admission Condition: poor    Discharged Condition: stable    Indication for Admission: threat to self    History of Present Illnes (present tense wording is of findings from admission exam and are not necessarily indicative of current findings):   Yaritza Esquivel is a 49 y.o. female who has a past medical history of bipolar disorder, schizophrenia, seizures, COPD, hypothyroidism, CVA, parathyroidectomy, and bilateral adrenalectomy with pheochromocytoma. Patient presented to [place] ED endorsing suicidal ideations to jump off a bridge or run into traffic and homicidal ideation towards someone at [facility] shelter, expressing thoughts to \"curb stop\" someone.  She did not indicate a specific target.  She is admitted to [facility] on a voluntary basis.     Patient is known to facility and was most recently admitted in October 2024.  At that time, she was discharged on fluoxetine.  Patient is following up with Dr. Lennon at [facility].  Reviewed PDMP, has been prescribed Klonopin 0.5 mg once daily starting

## 2025-04-01 NOTE — CARE COORDINATION
Name: Yaritza Esquivel    : 1975    Auth number: [id number]     Discharge Date: 25    Destination: home     Discharge Medications:      Medication List        START taking these medications      prazosin 2 MG capsule  Commonly known as: MINIPRESS  Take 1 capsule by mouth nightly  Notes to patient: Nightmares sleep aid      risperiDONE 1 MG disintegrating tablet  Commonly known as: RISPERDAL M-TABS  Take 1 tablet by mouth 2 times daily  Notes to patient: Mood      venlafaxine 150 MG extended release capsule  Commonly known as: EFFEXOR XR  Take 1 capsule by mouth daily (with breakfast)  Notes to patient: Mood             CHANGE how you take these medications      hydrocortisone 5 MG tablet  Commonly known as: CORTEF  Take 3 tablets by mouth every evening  What changed: Another medication with the same name was removed. Continue taking this medication, and follow the directions you see here.  Notes to patient: Cream for skin             CONTINUE taking these medications      ferrous sulfate 325 (65 Fe) MG tablet  Commonly known as: IRON 325  Take 1 tablet by mouth in the morning and at bedtime  Notes to patient: Iron replacement      fludrocortisone 0.1 MG tablet  Commonly known as: FLORINEF  Take 1 tablet by mouth daily  Notes to patient: Aid adrenal function     folic acid 1 MG tablet  Commonly known as: FOLVITE  Take 1 tablet by mouth daily  Notes to patient: Help replace red blood cells      levothyroxine 150 MCG tablet  Commonly known as: SYNTHROID  Take 1 tablet by mouth Daily  Notes to patient: Thyroid replacement      omeprazole 20 MG delayed release capsule  Commonly known as: PRILOSEC  Take 1 capsule by mouth daily  Notes to patient: Acid reducer      vitamin B-12 1000 MCG tablet  Commonly known as: CYANOCOBALAMIN  Take 1 tablet by mouth daily  Notes to patient: Help with energy            STOP taking these medications      amitriptyline 25 MG tablet  Commonly known as: ELAVIL     FLUoxetine 20 MG

## 2025-04-01 NOTE — GROUP NOTE
Group Therapy Note    Date: 3/31/2025    Group Start Time:   Group End Time:   Group Topic: Relaxation    [facility] I Stepdown    Clint Koehler RN        Group Therapy Note    Attendees: 7    patient refused to attend recreational group at 8 PM after encouragement from staff.  1:1 talk time was offered but declined as alternative to group session        Patient's Goal:  ***    Notes:  ***    Status After Intervention:  {Status After Intervention:207997973}    Participation Level: {Participation Level:757776788}    Participation Quality: {[facility] PARTICIPATION QUALITY:451316464}      Speech:  {[facility] CD_SPEECH:33708}      Thought Process/Content: {Thought Process/Content:292179191}      Affective Functioning: {Affective Functionin}      Mood: {Mood:000817269}      Level of consciousness:  {Level of consciousness:950395689}      Response to Learning: {[facility] Responses to Learnin}      Endings: {[facility] Endings:37421}    Modes of Intervention: {[facility] Modes of Intervention:834945824}      Discipline Responsible: {[facility] Multidisciplinary:770799890}      Signature:  Clint Koehler, RN     01-May-2020 21:32

## 2025-04-01 NOTE — CARE COORDINATION
Discharge Arrangements:  [free text here if needed/wanted]    Guardian notified: n/a    Discharge destination/address: [facility] [address] [place]    Transported by:  [facility]    Follow up appointment is scheduled with [facility] 04/01/2025 at 815am with Dr. Lennon. She did not accept a substance abuse referral.       *RYLIE resources were offered to patient throughout admission and at time of discharge. This list of [facility] RYLIE providers was provided to patient:     \[facility] [place] [facility]\"" of [facility]  [address] [facility] [address]   [address] [facility] [address]  Phone: [phone]     Phone: [phone]    [facility]  [place]   [address] [facility] [address]   [address] [facility] [address]  Phone: [phone]     Phone: [phone]    [facility] [place], [address] [address]    [address] [facility] [facility] [address]  Phone: [phone]     Phone: [phone]    [facility]     [facility]   [address] [facility] [facility] [address]  [place] [address]      Phone: [phone]  Phone: [phone]        [facility]  [address] [facility] [facility] [address]   [address] [facility] [address]  Phone: [phone]     Phone:  [phone]    [employer]      [facility]  [address] [facility] [address]   [address] [facility] [address]  Phone: [phone]     Phone: [phone]    [facility]  [address] [facility] [address]   [address] [facility] [address]  Phone: [phone]     Phone: [phone]    [facility]  [facility]  [address] [facility] [address] [address] [place] [address]  Phone: [phone]     Phone: [phone]    [facility]     [facility]  [facility] [facility] [address]

## 2025-04-01 NOTE — BH NOTE
[facility]  Discharge Note    Pt discharged with followings belongings:   Dental Appliances: None  Vision - Corrective Lenses: None  Hearing Aid: None  Jewelry: Bracelet, Necklace, Ring, Watch  Body Piercings Removed: N/A  Clothing: Jacket/Coat, Pants, Shirt, Socks, Undergarments, Footwear  Other Valuables: Credit/Debit Card   Valuables sent home with or returned to patient. Patient educated on aftercare instructions: yes  Information faxed to [facility] by staff  at 7:19 AM .Patient verbalize understanding of AVS:  yes.    Status EXAM upon discharge:  Mental Status and Behavioral Exam  Normal: No (unmotovated)  Level of Assistance: Independent/Self  Facial Expression: Flat  Affect: Appropriate  Level of Consciousness: Alert  Frequency of Checks: 4 times per hour, close  Mood:Normal: No  Mood: Depressed  Motor Activity:Normal: No  Motor Activity: Decreased  Eye Contact: Good  Observed Behavior: Cooperative, Preoccupied  Sexual Misconduct History: Current - no  Preception: [place] to person, [place] to time, [place] to place, [place] to situation  Attention:Normal: No  Attention: Distractible  Thought Processes: Circumstantial  Thought Content:Normal: No  Thought Content: Preoccupations  Depression Symptoms: Isolative  Anxiety Symptoms: Generalized  Marni Symptoms: No problems reported or observed.  Hallucinations: None  Delusions: No  Memory:Normal: Yes  Insight and Judgment: No  Insight and Judgment: Poor judgment, Poor insight    Tobacco Screening:  Practical Counseling, on admission, ko X, if applicable and completed (first 3 are required if patient doesn't refuse):            ( ) Recognizing danger situations (included triggers and roadblocks)                    ( ) Coping skills (new ways to manage stress,relaxation techniques, changing routine, distraction)                                                           ( ) Basic information about quitting (benefits of quitting, techniques in how to quit,

## 2025-04-08 ENCOUNTER — HOSPITAL ENCOUNTER (INPATIENT)
Age: 50
LOS: 14 days | Discharge: HOME HEALTH | DRG: 751 | End: 2025-04-23
Attending: STUDENT IN AN ORGANIZED HEALTH CARE EDUCATION/TRAINING PROGRAM | Admitting: PSYCHIATRY & NEUROLOGY
Payer: COMMERCIAL

## 2025-04-08 DIAGNOSIS — R45.851 SUICIDAL IDEATION: Primary | ICD-10-CM

## 2025-04-08 LAB
ALBUMIN SERPL-MCNC: 4.6 G/DL (ref 3.5–5.2)
ALP SERPL-CCNC: 158 U/L (ref 35–104)
ALT SERPL-CCNC: 19 U/L (ref 10–35)
ANION GAP SERPL CALCULATED.3IONS-SCNC: 18 MMOL/L (ref 9–16)
AST SERPL-CCNC: 20 U/L (ref 10–35)
BASOPHILS # BLD: 0.1 K/UL (ref 0–0.2)
BASOPHILS NFR BLD: 1 % (ref 0–2)
BILIRUB SERPL-MCNC: 0.3 MG/DL (ref 0–1.2)
BUN SERPL-MCNC: 14 MG/DL (ref 6–20)
CALCIUM SERPL-MCNC: 10.5 MG/DL (ref 8.6–10.4)
CHLORIDE SERPL-SCNC: 100 MMOL/L (ref 98–107)
CO2 SERPL-SCNC: 17 MMOL/L (ref 20–31)
CREAT SERPL-MCNC: 0.9 MG/DL (ref 0.7–1.2)
EOSINOPHIL # BLD: 0.3 K/UL (ref 0–0.4)
EOSINOPHILS RELATIVE PERCENT: 2 % (ref 0–4)
ERYTHROCYTE [DISTWIDTH] IN BLOOD BY AUTOMATED COUNT: 15.1 % (ref 11.5–14.9)
ETHANOL PERCENT: NORMAL %
ETHANOLAMINE SERPL-MCNC: <10 MG/DL (ref 0–0.08)
GFR, ESTIMATED: 78 ML/MIN/1.73M2
GLUCOSE SERPL-MCNC: 107 MG/DL (ref 74–99)
HCG SERPL QL: NEGATIVE
HCT VFR BLD AUTO: 46 % (ref 36–46)
HGB BLD-MCNC: 15.8 G/DL (ref 12–16)
LYMPHOCYTES NFR BLD: 3.7 K/UL (ref 1–4.8)
LYMPHOCYTES RELATIVE PERCENT: 24 % (ref 24–44)
MCH RBC QN AUTO: 30.9 PG (ref 26–34)
MCHC RBC AUTO-ENTMCNC: 34.2 G/DL (ref 31–37)
MCV RBC AUTO: 90.2 FL (ref 80–100)
MONOCYTES NFR BLD: 1.1 K/UL (ref 0.1–1.3)
MONOCYTES NFR BLD: 7 % (ref 1–7)
NEUTROPHILS NFR BLD: 66 % (ref 36–66)
NEUTS SEG NFR BLD: 10.5 K/UL (ref 1.3–9.1)
PLATELET # BLD AUTO: 432 K/UL (ref 150–450)
PMV BLD AUTO: 6.6 FL (ref 6–12)
POTASSIUM SERPL-SCNC: 4 MMOL/L (ref 3.7–5.3)
PROT SERPL-MCNC: 8.9 G/DL (ref 6.6–8.7)
RBC # BLD AUTO: 5.1 M/UL (ref 4–5.2)
SODIUM SERPL-SCNC: 135 MMOL/L (ref 136–145)
WBC OTHER # BLD: 15.6 K/UL (ref 3.5–11)

## 2025-04-08 PROCEDURE — 85025 COMPLETE CBC W/AUTO DIFF WBC: CPT

## 2025-04-08 PROCEDURE — G0480 DRUG TEST DEF 1-7 CLASSES: HCPCS

## 2025-04-08 PROCEDURE — 81001 URINALYSIS AUTO W/SCOPE: CPT

## 2025-04-08 PROCEDURE — 99285 EMERGENCY DEPT VISIT HI MDM: CPT

## 2025-04-08 PROCEDURE — 80307 DRUG TEST PRSMV CHEM ANLYZR: CPT

## 2025-04-08 PROCEDURE — 80053 COMPREHEN METABOLIC PANEL: CPT

## 2025-04-08 PROCEDURE — 36415 COLL VENOUS BLD VENIPUNCTURE: CPT

## 2025-04-08 PROCEDURE — 84703 CHORIONIC GONADOTROPIN ASSAY: CPT

## 2025-04-08 ASSESSMENT — PAIN - FUNCTIONAL ASSESSMENT: PAIN_FUNCTIONAL_ASSESSMENT: NONE - DENIES PAIN

## 2025-04-09 ENCOUNTER — APPOINTMENT (OUTPATIENT)
Dept: GENERAL RADIOLOGY | Age: 50
DRG: 751 | End: 2025-04-09
Payer: COMMERCIAL

## 2025-04-09 LAB
AMPHET UR QL SCN: NEGATIVE
APAP SERPL-MCNC: <5 UG/ML (ref 10–30)
B-OH-BUTYR SERPL-MCNC: 0.13 MMOL/L (ref 0.02–0.27)
BACTERIA URNS QL MICRO: ABNORMAL
BARBITURATES UR QL SCN: NEGATIVE
BENZODIAZ UR QL: NEGATIVE
BILIRUB UR QL STRIP: NEGATIVE
CANNABINOIDS UR QL SCN: NEGATIVE
CASTS #/AREA URNS LPF: ABNORMAL /LPF
CLARITY UR: CLEAR
COCAINE UR QL SCN: NEGATIVE
COLOR UR: YELLOW
EPI CELLS #/AREA URNS HPF: ABNORMAL /HPF
FENTANYL UR QL: NEGATIVE
GLUCOSE BLD-MCNC: 95 MG/DL (ref 65–105)
GLUCOSE UR STRIP-MCNC: NEGATIVE MG/DL
HGB UR QL STRIP.AUTO: ABNORMAL
KETONES UR STRIP-MCNC: NEGATIVE MG/DL
LACTATE BLDV-SCNC: 1.1 MMOL/L (ref 0.5–2.2)
LEUKOCYTE ESTERASE UR QL STRIP: NEGATIVE
METHADONE UR QL: NEGATIVE
NITRITE UR QL STRIP: NEGATIVE
OPIATES UR QL SCN: NEGATIVE
OXYCODONE UR QL SCN: NEGATIVE
PCP UR QL SCN: NEGATIVE
PH UR STRIP: 5 [PH] (ref 5–8)
PROT UR STRIP-MCNC: NEGATIVE MG/DL
RBC #/AREA URNS HPF: ABNORMAL /HPF
SALICYLATES SERPL-MCNC: <1 MG/DL (ref 0–10)
SP GR UR STRIP: 1.01 (ref 1–1.03)
T4 FREE SERPL-MCNC: 1.1 NG/DL (ref 0.9–1.7)
TEST INFORMATION: NORMAL
TSH SERPL DL<=0.05 MIU/L-ACNC: 17.6 UIU/ML (ref 0.27–4.2)
UROBILINOGEN UR STRIP-ACNC: NORMAL EU/DL (ref 0–1)
WBC #/AREA URNS HPF: ABNORMAL /HPF

## 2025-04-09 PROCEDURE — 84443 ASSAY THYROID STIM HORMONE: CPT

## 2025-04-09 PROCEDURE — 82947 ASSAY GLUCOSE BLOOD QUANT: CPT

## 2025-04-09 PROCEDURE — 84439 ASSAY OF FREE THYROXINE: CPT

## 2025-04-09 PROCEDURE — 6370000000 HC RX 637 (ALT 250 FOR IP): Performed by: PSYCHIATRY & NEUROLOGY

## 2025-04-09 PROCEDURE — 99222 1ST HOSP IP/OBS MODERATE 55: CPT | Performed by: INTERNAL MEDICINE

## 2025-04-09 PROCEDURE — 80143 DRUG ASSAY ACETAMINOPHEN: CPT

## 2025-04-09 PROCEDURE — 83605 ASSAY OF LACTIC ACID: CPT

## 2025-04-09 PROCEDURE — 6370000000 HC RX 637 (ALT 250 FOR IP): Performed by: INTERNAL MEDICINE

## 2025-04-09 PROCEDURE — 82010 KETONE BODYS QUAN: CPT

## 2025-04-09 PROCEDURE — 99222 1ST HOSP IP/OBS MODERATE 55: CPT | Performed by: PSYCHIATRY & NEUROLOGY

## 2025-04-09 PROCEDURE — 71045 X-RAY EXAM CHEST 1 VIEW: CPT

## 2025-04-09 PROCEDURE — 1240000000 HC EMOTIONAL WELLNESS R&B

## 2025-04-09 PROCEDURE — 36415 COLL VENOUS BLD VENIPUNCTURE: CPT

## 2025-04-09 PROCEDURE — APPSS60 APP SPLIT SHARED TIME 46-60 MINUTES

## 2025-04-09 PROCEDURE — 80179 DRUG ASSAY SALICYLATE: CPT

## 2025-04-09 RX ORDER — HYDROCORTISONE 10 MG/1
15 TABLET ORAL EVERY EVENING
Status: DISCONTINUED | OUTPATIENT
Start: 2025-04-09 | End: 2025-04-16

## 2025-04-09 RX ORDER — ACETAMINOPHEN 325 MG/1
650 TABLET ORAL EVERY 4 HOURS PRN
Status: DISCONTINUED | OUTPATIENT
Start: 2025-04-09 | End: 2025-04-09

## 2025-04-09 RX ORDER — VENLAFAXINE HYDROCHLORIDE 150 MG/1
150 CAPSULE, EXTENDED RELEASE ORAL
Status: DISCONTINUED | OUTPATIENT
Start: 2025-04-10 | End: 2025-04-13

## 2025-04-09 RX ORDER — MAGNESIUM HYDROXIDE/ALUMINUM HYDROXICE/SIMETHICONE 120; 1200; 1200 MG/30ML; MG/30ML; MG/30ML
30 SUSPENSION ORAL EVERY 6 HOURS PRN
Status: DISCONTINUED | OUTPATIENT
Start: 2025-04-09 | End: 2025-04-23 | Stop reason: HOSPADM

## 2025-04-09 RX ORDER — FLUDROCORTISONE ACETATE 0.1 MG/1
0.1 TABLET ORAL DAILY
Status: DISCONTINUED | OUTPATIENT
Start: 2025-04-09 | End: 2025-04-23 | Stop reason: HOSPADM

## 2025-04-09 RX ORDER — HALOPERIDOL 5 MG/1
5 TABLET ORAL EVERY 4 HOURS PRN
Status: DISCONTINUED | OUTPATIENT
Start: 2025-04-09 | End: 2025-04-23 | Stop reason: HOSPADM

## 2025-04-09 RX ORDER — TRAZODONE HYDROCHLORIDE 50 MG/1
50 TABLET ORAL NIGHTLY PRN
Status: DISCONTINUED | OUTPATIENT
Start: 2025-04-09 | End: 2025-04-09

## 2025-04-09 RX ORDER — POLYETHYLENE GLYCOL 3350 17 G/17G
17 POWDER, FOR SOLUTION ORAL DAILY PRN
Status: DISCONTINUED | OUTPATIENT
Start: 2025-04-09 | End: 2025-04-23 | Stop reason: HOSPADM

## 2025-04-09 RX ORDER — HYDROXYZINE HYDROCHLORIDE 50 MG/1
50 TABLET, FILM COATED ORAL 3 TIMES DAILY PRN
Status: DISCONTINUED | OUTPATIENT
Start: 2025-04-09 | End: 2025-04-09

## 2025-04-09 RX ORDER — TRAZODONE HYDROCHLORIDE 50 MG/1
50 TABLET ORAL NIGHTLY PRN
Status: DISCONTINUED | OUTPATIENT
Start: 2025-04-09 | End: 2025-04-09 | Stop reason: ALTCHOICE

## 2025-04-09 RX ORDER — HALOPERIDOL 5 MG/ML
5 INJECTION INTRAMUSCULAR EVERY 4 HOURS PRN
Status: DISCONTINUED | OUTPATIENT
Start: 2025-04-09 | End: 2025-04-23 | Stop reason: HOSPADM

## 2025-04-09 RX ORDER — IBUPROFEN 400 MG/1
400 TABLET, FILM COATED ORAL EVERY 6 HOURS PRN
Status: DISCONTINUED | OUTPATIENT
Start: 2025-04-09 | End: 2025-04-23 | Stop reason: HOSPADM

## 2025-04-09 RX ORDER — HYDROXYZINE HYDROCHLORIDE 50 MG/1
50 TABLET, FILM COATED ORAL 3 TIMES DAILY PRN
Status: DISCONTINUED | OUTPATIENT
Start: 2025-04-09 | End: 2025-04-23 | Stop reason: HOSPADM

## 2025-04-09 RX ORDER — DIPHENHYDRAMINE HYDROCHLORIDE 50 MG/ML
50 INJECTION, SOLUTION INTRAMUSCULAR; INTRAVENOUS EVERY 4 HOURS PRN
Status: DISCONTINUED | OUTPATIENT
Start: 2025-04-09 | End: 2025-04-23 | Stop reason: HOSPADM

## 2025-04-09 RX ORDER — LEVOTHYROXINE SODIUM 150 UG/1
150 TABLET ORAL DAILY
Status: DISCONTINUED | OUTPATIENT
Start: 2025-04-09 | End: 2025-04-23 | Stop reason: HOSPADM

## 2025-04-09 RX ORDER — POLYETHYLENE GLYCOL 3350 17 G
2 POWDER IN PACKET (EA) ORAL
Status: DISCONTINUED | OUTPATIENT
Start: 2025-04-09 | End: 2025-04-23 | Stop reason: HOSPADM

## 2025-04-09 RX ADMIN — HYDROCORTISONE 15 MG: 10 TABLET ORAL at 20:45

## 2025-04-09 RX ADMIN — FLUDROCORTISONE ACETATE 0.1 MG: 0.1 TABLET ORAL at 14:46

## 2025-04-09 RX ADMIN — HYDROXYZINE HYDROCHLORIDE 50 MG: 50 TABLET, FILM COATED ORAL at 20:44

## 2025-04-09 RX ADMIN — HYDROXYZINE HYDROCHLORIDE 50 MG: 50 TABLET, FILM COATED ORAL at 01:12

## 2025-04-09 ASSESSMENT — PATIENT HEALTH QUESTIONNAIRE - PHQ9
SUM OF ALL RESPONSES TO PHQ QUESTIONS 1-9: 16
4. FEELING TIRED OR HAVING LITTLE ENERGY: MORE THAN HALF THE DAYS
2. FEELING DOWN, DEPRESSED OR HOPELESS: MORE THAN HALF THE DAYS
10. IF YOU CHECKED OFF ANY PROBLEMS, HOW DIFFICULT HAVE THESE PROBLEMS MADE IT FOR YOU TO DO YOUR WORK, TAKE CARE OF THINGS AT HOME, OR GET ALONG WITH OTHER PEOPLE: VERY DIFFICULT
SUM OF ALL RESPONSES TO PHQ QUESTIONS 1-9: 18
8. MOVING OR SPEAKING SO SLOWLY THAT OTHER PEOPLE COULD HAVE NOTICED. OR THE OPPOSITE, BEING SO FIGETY OR RESTLESS THAT YOU HAVE BEEN MOVING AROUND A LOT MORE THAN USUAL: MORE THAN HALF THE DAYS
9. THOUGHTS THAT YOU WOULD BE BETTER OFF DEAD, OR OF HURTING YOURSELF: MORE THAN HALF THE DAYS
7. TROUBLE CONCENTRATING ON THINGS, SUCH AS READING THE NEWSPAPER OR WATCHING TELEVISION: MORE THAN HALF THE DAYS
SUM OF ALL RESPONSES TO PHQ QUESTIONS 1-9: 18
SUM OF ALL RESPONSES TO PHQ QUESTIONS 1-9: 18
3. TROUBLE FALLING OR STAYING ASLEEP: MORE THAN HALF THE DAYS
6. FEELING BAD ABOUT YOURSELF - OR THAT YOU ARE A FAILURE OR HAVE LET YOURSELF OR YOUR FAMILY DOWN: MORE THAN HALF THE DAYS
5. POOR APPETITE OR OVEREATING: MORE THAN HALF THE DAYS
1. LITTLE INTEREST OR PLEASURE IN DOING THINGS: MORE THAN HALF THE DAYS

## 2025-04-09 ASSESSMENT — SLEEP AND FATIGUE QUESTIONNAIRES
AVERAGE NUMBER OF SLEEP HOURS: 5
SLEEP PATTERN: DIFFICULTY FALLING ASLEEP;DISTURBED/INTERRUPTED SLEEP
DO YOU HAVE DIFFICULTY SLEEPING: YES
DO YOU USE A SLEEP AID: YES
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
## 2025-04-09 NOTE — BH NOTE
[facility]  Admission Note     Admission Type:   Voluntary     Reason for admission:  Reason for Admission: Patient reports suicidal ideation with plan to jump off bridge or run into traffic.      Addictive Behavior:   Addictive Behavior  In the Past 3 Months, Have You Felt or Has Someone Told You That You Have a Problem With  : None    Medical Problems:   Past Medical History:   Diagnosis Date    Adrenal insufficiency     Depression     Diabetes mellitus type 2, diet-controlled (HCC)     Headache     Hypothyroidism     MEN (multiple endocrine neoplasia) ([facility])        Status EXAM:  Mental Status and Behavioral Exam  Normal: No  Level of Assistance: Independent/Self  Facial Expression: Flat, Sad, Avoids Gaze  Affect: Appropriate  Level of Consciousness: Alert  Frequency of Checks: 4 times per hour, close  Mood:Normal: No  Mood: Depressed, Anxious, Sad, Helpless  Motor Activity:Normal: No  Motor Activity: Decreased  Eye Contact: Poor  Observed Behavior: Preoccupied, Cooperative  Sexual Misconduct History: Current - no  Preception: [place] to person, [place] to time, [place] to place, [place] to situation  Attention:Normal: No  Attention: Distractible  Thought Processes: Circumstantial  Thought Content:Normal: No  Thought Content: Preoccupations  Depression Symptoms: Feelings of worthlessness, Impaired concentration, Isolative, Feelings of hopelessess, Feelings of helplessness  Anxiety Symptoms: Generalized  Marni Symptoms: No problems reported or observed.  Hallucinations: None  Delusions: No  Memory:Normal: Yes  Insight and Judgment: No  Insight and Judgment: Poor judgment, Poor insight    Tobacco Screening:  Practical Counseling, on admission, ko X, if applicable and completed (first 3 are required if patient doesn't refuse):            ( ) Recognizing danger situations (included triggers and roadblocks)                    ( ) Coping skills (new ways to manage stress,relaxation techniques, changing

## 2025-04-09 NOTE — BH NOTE
[facility]  Initial Interdisciplinary Treatment Plan Note      Original treatment plan Date & Time: 4-9-25 1300    Admission Type:  Admission Type: Voluntary    Reason for admission:   Reason for Admission: Patient reports suicidal ideation with plan to jump off bridge or run into traffic.    Estimated Length of Stay:  5-7days  Estimated Discharge Date: To be determined by physician.    PATIENT STRENGTHS:  Patient Strengths:   Patient Strengths and Limitations:   Addictive Behavior: Addictive Behavior  In the Past 3 Months, Have You Felt or Has Someone Told You That You Have a Problem With  : None  Medical Problems:  Past Medical History:   Diagnosis Date    Adrenal insufficiency     Depression     Diabetes mellitus type 2, diet-controlled ([facility])     Headache     Hypothyroidism     MEN (multiple endocrine neoplasia) ([facility])      Status EXAM:Mental Status and Behavioral Exam  Normal: No  Level of Assistance: Independent/Self  Facial Expression: Flat, Avoids Gaze  Affect: Blunt  Level of Consciousness: Alert  Frequency of Checks: 4 times per hour, close  Mood:Normal: No  Mood: Depressed, Anxious, Sad  Motor Activity:Normal: No  Motor Activity: Decreased  Eye Contact: Poor  Observed Behavior: Cooperative  Sexual Misconduct History: Current - no  Preception: [place] to person, [place] to time, [place] to place  Attention:Normal: No  Attention: Distractible  Thought Processes: Circumstantial  Thought Content:Normal: No  Thought Content: Preoccupations  Depression Symptoms: Feelings of helplessness, Feelings of hopelessess, Feelings of worthlessness, Isolative  Anxiety Symptoms: Generalized  Marni Symptoms: No problems reported or observed.  Hallucinations: None  Delusions: No  Memory:Normal: Yes  Insight and Judgment: No  Insight and Judgment: Poor judgment, Poor insight    EDUCATION:   Learner Progress Toward Treatment Goals: Will review group plans and strategies for care.    Method: Group therapy,

## 2025-04-09 NOTE — PLAN OF CARE
Problem: Self Harm/Suicidality  Goal: Will have no self-injury during hospital stay  Description: INTERVENTIONS:  1.  Ensure constant observer at bedside with Q15M safety checks  2.  Maintain a safe environment  3.  Secure patient belongings  4.  Ensure family/visitors adhere to safety recommendations  5.  Ensure safety tray has been added to patient's diet order  6.  Every shift and PRN: Re-assess suicidal risk via Frequent Screener    Outcome: Progressing  Flowsheets (Taken 4/9/2025 1757)  Will have no self-injury during hospital stay: Maintain a safe environment  Note: Patient isolative to room,up for meals only, patient lacks energy and motivation into self care, and programming. Patient denies thoughts of self harm,appears thought blocked and preoccupied. Instructed on seeking help when needed, and communicate with treatment team. No violent or negative behaviors noted at this time.  Will cont to provide safe, calm, environment and use verbal de-escalation techniques when needed.  Support and reassurance given as needed.
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
## 2025-04-09 NOTE — H&P
Department of Psychiatry  Attending Physician Psychiatric Assessment   Patient: Yaritza Esquivel  MRN: [id number]  Reason for Admission to Psychiatric Unit:  Threat to self requiring 24 hour professional observation  A mental disorder causing major disability in social, interpersonal, occupational, and/or educational functioning that is leading to dangerous or life-threatening functioning, and that can only be addressed in an acute inpatient setting   Concerns about patient's safety in the community  Past Mental Health Diagnosis: a history of  Major Depression, Prior suicide attempt, and Alcohol and/or Drug Use Disorder  Triggering event or precipitating factor: Housing instability and Financial instability  Length of time/duration of triggering event: Days  Legal Status: Voluntary    CHIEF COMPLAINT: Depression with suicidal ideation with plans to run into traffic or jump off a bridge    History obtained from: Patient, electronic medical record          HISTORY OF PRESENT ILLNESS:    Yaritza Esquivel is a 49 y.o. female who has a past medical history of bipolar disorder, schizophrenia, seizures, COPD, hypothyroidism, CVA, parathyroidectomy, and bilateral adrenalectomy with pheochromocytoma . Patient presented to the ED via law enforcement. She reports calling 911 requesting help. She endorses suicidal ideation with plan to jump off a bridge or run into traffic. She denies homicidal ideation. She reports compliance taking medications and follow up at [facility]. She was recently discharge from [facility] on 4/1/2025. She admits on voluntary status.    Patient is seen today for initial assessment. She is found lying in bed awake. She requests to conduct interview at bedside with door open. She denies need for privacy. Roommate present duration of encounter. She is guarded but willingly answers questions. She describes her mood as \"depressed\". She denies stabilization prior to discharge from [facility] on 4/1/2025. At this time, she was

## 2025-04-09 NOTE — H&P
[facility] Internal Medicine  Johnathan Rosario MD; Otis Acosta MD, Yahir Valenzuela MD, Anne Segal MD, Dr Lisa Melgar MD; Rylie Garcia MD    [facility] Internal Medicine   IN-PATIENT SERVICE   [facility]     HISTORY AND PHYSICAL EXAMINATION            Date:   4/9/2025  Patient name:  Yaritza Esquivel  Date of admission:  4/8/2025  9:52 PM  MRN:   [id number]  Account:  [id number]  YOB: 1975  PCP:    C-Jacob, Unspecified (Inactive)  Room:   [address] [place] [address]  Code Status:    Full Code      Chief Complaint:     Suicidal /Ac Psychosis    History Obtained From:     Patient/EMR/bedside RN     History of Present Illness:     Patient is 49-year-old female multiple comorbidities including hypothyroidism, [place]'s disease, family history of men 2 syndrome status post parathyroidectomy, bilateral adrenalectomy, on hormone replacement, has been admitted at [facility] for further management of depression suicidal ideation    Past Medical History:     Past Medical History:   Diagnosis Date    Adrenal insufficiency     Depression     Diabetes mellitus type 2, diet-controlled (HCC)     Headache     Hypothyroidism     MEN (multiple endocrine neoplasia) (HCC)         Past Surgical History:     Past Surgical History:   Procedure Laterality Date    THYROIDECTOMY      TONSILLECTOMY      TOTAL HIP ARTHROPLASTY Right         Medications Prior to Admission:     Prior to Admission medications    Medication Sig Start Date End Date Taking? Authorizing Provider   prazosin (MINIPRESS) 2 MG capsule Take 1 capsule by mouth nightly 3/31/25   David Avelar MD   risperiDONE (RISPERDAL M-TABS) 1 MG disintegrating tablet Take 1 tablet by mouth 2 times daily 3/31/25   David Avelar MD   venlafaxine (EFFEXOR XR) 150 MG extended release capsule Take 1 capsule by mouth daily (with breakfast) 4/1/25   David Avelar MD   folic acid (FOLVITE) 1 MG tablet Take 1 tablet by mouth

## 2025-04-09 NOTE — ED PROVIDER NOTES
EMERGENCY DEPARTMENT ENCOUNTER    Pt Name: Yaritza Esquivel  MRN: [id number]  Birthdate 1975  Date of evaluation: 4/8/25  CHIEF COMPLAINT       Chief Complaint   Patient presents with    Suicidal     'I know how to go play in traffic\"     HISTORY OF PRESENT ILLNESS   49-year-old presenting with suicidal ideation    Depressed.  Suicidal plan to jump off a bridge or run into traffic    Denies homicidal ideation.  No auditory visualizations.  No drugs or alcohol              REVIEW OF SYSTEMS     Review of Systems   Constitutional:  Negative for chills and fever.   HENT:  Negative for rhinorrhea and sore throat.    Eyes:  Negative for discharge and redness.   Respiratory:  Negative for shortness of breath.    Cardiovascular:  Negative for chest pain.   Gastrointestinal:  Negative for abdominal pain, diarrhea, nausea and vomiting.   Genitourinary:  Negative for dysuria, frequency and urgency.   Musculoskeletal:  Negative for arthralgias and myalgias.   Skin:  Negative for rash.   Neurological:  Negative for weakness and numbness.   Psychiatric/Behavioral:  Positive for suicidal ideas.    All other systems reviewed and are negative.    PASTMEDICAL HISTORY     Past Medical History:   Diagnosis Date    Adrenal insufficiency     Depression     Diabetes mellitus type 2, diet-controlled ([facility])     Headache     Hypothyroidism     MEN (multiple endocrine neoplasia) ([facility])      Past Problem List  Patient Active Problem List   Diagnosis Code    Hypothyroidism E03.9    Schizophrenia F20.9    Bipolar depression ([facility]) F31.9    Depression F32.A    Depression with suicidal ideation F32.A, R45.851    Major depressive disorder, recurrent, severe without psychotic behavior ([facility]) F33.2    Chronic midline low back pain without sciatica M54.50, G89.29    Scoliosis of lumbar spine M41.9    PTSD (post-traumatic stress disorder) F43.10    Suicidal ideation R45.851     SURGICAL HISTORY       Past Surgical History:   Procedure Laterality Date

## 2025-04-10 PROCEDURE — 99232 SBSQ HOSP IP/OBS MODERATE 35: CPT | Performed by: PSYCHIATRY & NEUROLOGY

## 2025-04-10 PROCEDURE — 6370000000 HC RX 637 (ALT 250 FOR IP): Performed by: INTERNAL MEDICINE

## 2025-04-10 PROCEDURE — 6370000000 HC RX 637 (ALT 250 FOR IP): Performed by: PSYCHIATRY & NEUROLOGY

## 2025-04-10 PROCEDURE — 99232 SBSQ HOSP IP/OBS MODERATE 35: CPT | Performed by: INTERNAL MEDICINE

## 2025-04-10 PROCEDURE — 1240000000 HC EMOTIONAL WELLNESS R&B

## 2025-04-10 RX ADMIN — LEVOTHYROXINE SODIUM 150 MCG: 0.15 TABLET ORAL at 06:40

## 2025-04-10 RX ADMIN — FLUDROCORTISONE ACETATE 0.1 MG: 0.1 TABLET ORAL at 09:50

## 2025-04-10 RX ADMIN — VENLAFAXINE HYDROCHLORIDE 150 MG: 150 CAPSULE, EXTENDED RELEASE ORAL at 09:50

## 2025-04-10 NOTE — PLAN OF CARE
Problem: Risk for Elopement  Goal: Patient will not exit the unit/facility without proper excort  Outcome: Progressing  Flowsheets (Taken 4/9/2025 2317)  Nursing Interventions for Elopement Risk:   Assist with personal care needs such as toileting, eating, dressing, as needed to reduce the risk of wandering   Collaborate with family members/caregivers to mitigate the elopement risk   Collaborate with treatment team for drug withdrawal symptoms treatment   Communicate/escalate to charge nurse the risk of elopement   Collaborate with treatment team for nicotine replacement   Communicate/escalate to /other team member the risk of elopement   Communicate/escalate to nursing supervisor the risk of elopement   Make sure patient has all necessary personal care items   Escort with two staff members if patient must leave the unit   Communicate to physician the risk for elopement   Reduce environmental triggers   Place patient in room far away from exits and stairways   Shoes and clothing collected and placed in gown attire  Note: Patient is absent of exit seeking behaviors. Staff ensures safety by providing safety checks on the unit intermittently and every 15 minutes. Staff continues to provide a safe environment.       Problem: Anxiety  Goal: Will report anxiety at manageable levels  Description: INTERVENTIONS:  1. Administer medication as ordered  2. Teach and rehearse alternative coping skills  3. Provide emotional support with 1:1 interaction with staff  Outcome: Not Progressing  Flowsheets (Taken 4/9/2025 2317)  Will report anxiety at manageable levels:   Administer medication as ordered   Teach and rehearse alternative coping skills   Provide emotional support with 1:1 interaction with staff  Note: Patient remains free from harm to self or others but admits to continued anxiety and depression. Medications available upon request. Staff ensures safety by providing safety checks on the unit intermittently

## 2025-04-10 NOTE — PLAN OF CARE
Problem: Risk for Elopement  Goal: Patient will not exit the unit/facility without proper excort  Outcome: Progressing     Problem: Anxiety  Goal: Will report anxiety at manageable levels  Description: INTERVENTIONS:  1. Administer medication as ordered  2. Teach and rehearse alternative coping skills  3. Provide emotional support with 1:1 interaction with staff  Outcome: Progressing     Problem: Self Harm/Suicidality  Goal: Will have no self-injury during hospital stay  Description: INTERVENTIONS:  1.  Ensure constant observer at bedside with Q15M safety checks  2.  Maintain a safe environment  3.  Secure patient belongings  4.  Ensure family/visitors adhere to safety recommendations  5.  Ensure safety tray has been added to patient's diet order  6.  Every shift and PRN: Re-assess suicidal risk via Frequent Screener    Note: Patient denies suicidal/homicidal ideations, she is easily irritated but in control of her behavior/emotions. Patient is thought blocked, evasive and isolative to her room with a flat affect. Programming and 1:1 talk offered but ineffective at time of assessment. Q15 minute checks will continue to be conducted throughout shift.

## 2025-04-11 LAB
ALBUMIN SERPL-MCNC: 4 G/DL (ref 3.5–5.2)
ALP SERPL-CCNC: 110 U/L (ref 35–104)
ALT SERPL-CCNC: 13 U/L (ref 10–35)
ANION GAP SERPL CALCULATED.3IONS-SCNC: 9 MMOL/L (ref 9–16)
AST SERPL-CCNC: 14 U/L (ref 10–35)
BASOPHILS # BLD: 0.1 K/UL (ref 0–0.2)
BASOPHILS NFR BLD: 1 % (ref 0–2)
BILIRUB SERPL-MCNC: 0.2 MG/DL (ref 0–1.2)
BUN SERPL-MCNC: 17 MG/DL (ref 6–20)
CALCIUM SERPL-MCNC: 10.1 MG/DL (ref 8.6–10.4)
CHLORIDE SERPL-SCNC: 102 MMOL/L (ref 98–107)
CO2 SERPL-SCNC: 26 MMOL/L (ref 20–31)
CREAT SERPL-MCNC: 0.8 MG/DL (ref 0.7–1.2)
EOSINOPHIL # BLD: 0.4 K/UL (ref 0–0.4)
EOSINOPHILS RELATIVE PERCENT: 5 % (ref 0–4)
ERYTHROCYTE [DISTWIDTH] IN BLOOD BY AUTOMATED COUNT: 14.7 % (ref 11.5–14.9)
GFR, ESTIMATED: >90 ML/MIN/1.73M2
GLUCOSE SERPL-MCNC: 86 MG/DL (ref 74–99)
HCT VFR BLD AUTO: 40.6 % (ref 36–46)
HGB BLD-MCNC: 13.7 G/DL (ref 12–16)
LYMPHOCYTES NFR BLD: 2.8 K/UL (ref 1–4.8)
LYMPHOCYTES RELATIVE PERCENT: 33 % (ref 24–44)
MCH RBC QN AUTO: 30.7 PG (ref 26–34)
MCHC RBC AUTO-ENTMCNC: 33.8 G/DL (ref 31–37)
MCV RBC AUTO: 90.8 FL (ref 80–100)
MONOCYTES NFR BLD: 0.6 K/UL (ref 0.1–1.3)
MONOCYTES NFR BLD: 7 % (ref 1–7)
NEUTROPHILS NFR BLD: 54 % (ref 36–66)
NEUTS SEG NFR BLD: 4.6 K/UL (ref 1.3–9.1)
PLATELET # BLD AUTO: 322 K/UL (ref 150–450)
PMV BLD AUTO: 6.7 FL (ref 6–12)
POTASSIUM SERPL-SCNC: 4.6 MMOL/L (ref 3.7–5.3)
PROT SERPL-MCNC: 7.3 G/DL (ref 6.6–8.7)
RBC # BLD AUTO: 4.47 M/UL (ref 4–5.2)
SODIUM SERPL-SCNC: 137 MMOL/L (ref 136–145)
WBC OTHER # BLD: 8.4 K/UL (ref 3.5–11)

## 2025-04-11 PROCEDURE — APPSS30 APP SPLIT SHARED TIME 16-30 MINUTES: Performed by: NURSE PRACTITIONER

## 2025-04-11 PROCEDURE — 80053 COMPREHEN METABOLIC PANEL: CPT

## 2025-04-11 PROCEDURE — 85025 COMPLETE CBC W/AUTO DIFF WBC: CPT

## 2025-04-11 PROCEDURE — 6370000000 HC RX 637 (ALT 250 FOR IP): Performed by: INTERNAL MEDICINE

## 2025-04-11 PROCEDURE — 1240000000 HC EMOTIONAL WELLNESS R&B

## 2025-04-11 PROCEDURE — 6370000000 HC RX 637 (ALT 250 FOR IP): Performed by: PSYCHIATRY & NEUROLOGY

## 2025-04-11 PROCEDURE — 99232 SBSQ HOSP IP/OBS MODERATE 35: CPT | Performed by: PSYCHIATRY & NEUROLOGY

## 2025-04-11 PROCEDURE — 99232 SBSQ HOSP IP/OBS MODERATE 35: CPT | Performed by: INTERNAL MEDICINE

## 2025-04-11 PROCEDURE — 36415 COLL VENOUS BLD VENIPUNCTURE: CPT

## 2025-04-11 RX ADMIN — FLUDROCORTISONE ACETATE 0.1 MG: 0.1 TABLET ORAL at 08:38

## 2025-04-11 RX ADMIN — LEVOTHYROXINE SODIUM 150 MCG: 0.15 TABLET ORAL at 06:52

## 2025-04-11 RX ADMIN — VENLAFAXINE HYDROCHLORIDE 150 MG: 150 CAPSULE, EXTENDED RELEASE ORAL at 08:38

## 2025-04-11 ASSESSMENT — PAIN SCALES - GENERAL: PAINLEVEL_OUTOF10: 4

## 2025-04-11 ASSESSMENT — PAIN DESCRIPTION - LOCATION: LOCATION: OTHER (COMMENT)

## 2025-04-11 NOTE — PLAN OF CARE
Problem: Risk for Elopement  Goal: Patient will not exit the unit/facility without proper excort  4/11/2025 1143 by Yoselin Dang LPN  Outcome: Progressing   Patient exhibits no exit seeking behaviors  Problem: Self Harm/Suicidality  Goal: Will have no self-injury during hospital stay  Description: INTERVENTIONS:  1.  Ensure constant observer at bedside with Q15M safety checks  2.  Maintain a safe environment  3.  Secure patient belongings  4.  Ensure family/visitors adhere to safety recommendations  5.  Ensure safety tray has been added to patient's diet order  6.  Every shift and PRN: Re-assess suicidal risk via Frequent Screener    4/11/2025 1143 by Yoselin Dang LPN  Outcome: Progressing   Patient denies thoughts to harm self  Problem: Anxiety  Goal: Will report anxiety at manageable levels  Description: INTERVENTIONS:  1. Administer medication as ordered  2. Teach and rehearse alternative coping skills  3. Provide emotional support with 1:1 interaction with staff  4/11/2025 1143 by Yoselin Dang LPN  Outcome: Progressing   Miss Esquivel is seen in her room, she is isolative to room, Rates depression and anxiety at 8 and 9. Comes out for meals and needs, not attending any groups, Reports sleep is okay and no issues with appetite.   Problem: Pain  Goal: Verbalizes/displays adequate comfort level or baseline comfort level  Outcome: Progressing   Patient reports some discomfort to RT side, states she always has this

## 2025-04-11 NOTE — BH NOTE
[facility]  Day 3 Interdisciplinary Treatment Plan NOTE    Review Date & Time: 4/11/2025 12:45pm    Admission Type:   Admission Type: Voluntary    Reason for admission:  Reason for Admission: Patient reports suicidal ideation with plan to jump off bridge or run into traffic.  Estimated Length of Stay: 5-7 days  Estimated Discharge Date Update: to be determined by physician    PATIENT STRENGTHS:  Patient Strengths    Patient Strengths and Limitations:Limitations: Tendency to isolate self, General negative or hopeless attitude about future/recovery, Multiple barriers to leisure interests, Difficulty problem solving/relies on others to help solve problems  Addictive Behavior:Addictive Behavior  In the Past 3 Months, Have You Felt or Has Someone Told You That You Have a Problem With  : None  Medical Problems:  Past Medical History:   Diagnosis Date    Adrenal insufficiency     Depression     Diabetes mellitus type 2, diet-controlled (HCC)     Headache     Hypothyroidism     MEN (multiple endocrine neoplasia) (HCC)        Risk:  Fall Risk   Bart Scale Bart Scale Score: 22  BVC    Change in scores no Changes to plan of Care no    Status EXAM:   Mental Status and Behavioral Exam  Normal: No  Level of Assistance: Independent/Self  Facial Expression: Flat  Affect: Blunt  Level of Consciousness: Alert  Frequency of Checks: 4 times per hour, close  Mood:Normal: No  Mood: Depressed, Anxious  Motor Activity:Normal: No  Motor Activity: Decreased  Eye Contact: Fair  Observed Behavior: Cooperative, Withdrawn  Sexual Misconduct History: Current - no  Preception: [place] to person, [place] to time, [place] to place  Attention:Normal: Yes  Attention: Unable to concentrate  Thought Processes: Blocking  Thought Content:Normal: No  Thought Content: Poverty of content  Depression Symptoms: Feelings of helplessness, Change in energy level  Anxiety Symptoms: Generalized  Marni Symptoms: No problems reported or

## 2025-04-11 NOTE — PLAN OF CARE
Problem: Self Harm/Suicidality  Goal: Will have no self-injury during hospital stay  Description: INTERVENTIONS:  1.  Ensure constant observer at bedside with Q15M safety checks  2.  Maintain a safe environment  3.  Secure patient belongings  4.  Ensure family/visitors adhere to safety recommendations  5.  Ensure safety tray has been added to patient's diet order  6.  Every shift and PRN: Re-assess suicidal risk via Frequent Screener  4/11/2025 0322 by Emelina Zabala, RN  Outcome: Progressing  Flowsheets (Taken 4/11/2025 0322)  Will have no self-injury during hospital stay:   Maintain a safe environment   Every shift and PRN: Re-assess suicidal risk via Frequent Screener  Note: Pt denies any suicidal or homicidal ideations, denies any hallucinations. Pt agreed to seek staff and report any intrusive thoughts of hurting self or others.  Pt remains free from any self-harm, safe environment maintained. Regular rounding 4 times an hour and random patient checks done for safety and per unit policy.        Problem: Anxiety  Goal: Will report anxiety at manageable levels  Description: INTERVENTIONS:  1. Administer medication as ordered  2. Teach and rehearse alternative coping skills  3. Provide emotional support with 1:1 interaction with staff  4/11/2025 0322 by Emelina Zabala, RN  Outcome: Progressing  Flowsheets (Taken 4/11/2025 0322)  Will report anxiety at manageable levels:   Teach and rehearse alternative coping skills   Provide emotional support with 1:1 interaction with staff  Note: Patient reports anxiety, rates it at 9 on a scale of 0-10 (10 being the worst anxiety and 0 being no anxiety). Coping strategies explored with patients, emotional support and reassurance provided as needed during 1:1 interaction. Patient isolative to room throughout shift, resting in bed with eyes closed.        Problem: Risk for Elopement  Goal: Patient will not exit the unit/facility without proper excort  4/11/2025 0322 by

## 2025-04-12 PROCEDURE — 99231 SBSQ HOSP IP/OBS SF/LOW 25: CPT | Performed by: INTERNAL MEDICINE

## 2025-04-12 PROCEDURE — 99232 SBSQ HOSP IP/OBS MODERATE 35: CPT | Performed by: PSYCHIATRY & NEUROLOGY

## 2025-04-12 PROCEDURE — 6370000000 HC RX 637 (ALT 250 FOR IP): Performed by: PSYCHIATRY & NEUROLOGY

## 2025-04-12 PROCEDURE — 1240000000 HC EMOTIONAL WELLNESS R&B

## 2025-04-12 PROCEDURE — 6370000000 HC RX 637 (ALT 250 FOR IP): Performed by: INTERNAL MEDICINE

## 2025-04-12 RX ADMIN — HYDROCORTISONE 15 MG: 10 TABLET ORAL at 10:12

## 2025-04-12 RX ADMIN — FLUDROCORTISONE ACETATE 0.1 MG: 0.1 TABLET ORAL at 10:12

## 2025-04-12 RX ADMIN — LEVOTHYROXINE SODIUM 150 MCG: 0.15 TABLET ORAL at 06:46

## 2025-04-12 RX ADMIN — VENLAFAXINE HYDROCHLORIDE 150 MG: 150 CAPSULE, EXTENDED RELEASE ORAL at 10:12

## 2025-04-12 ASSESSMENT — PAIN SCALES - GENERAL: PAINLEVEL_OUTOF10: 4

## 2025-04-12 NOTE — PLAN OF CARE
Problem: Self Harm/Suicidality  Goal: Will have no self-injury during hospital stay  Description: INTERVENTIONS:  1.  Ensure constant observer at bedside with Q15M safety checks  2.  Maintain a safe environment  3.  Secure patient belongings  4.  Ensure family/visitors adhere to safety recommendations  5.  Ensure safety tray has been added to patient's diet order  6.  Every shift and PRN: Re-assess suicidal risk via Frequent Screener  4/11/2025 2216 by Emelina Zabala, RN  Outcome: Progressing  Flowsheets (Taken 4/11/2025 2213)  Will have no self-injury during hospital stay:   Maintain a safe environment   Every shift and PRN: Re-assess suicidal risk via Frequent Screener  Note: Pt denies any suicidal or homicidal ideations, denies any hallucinations. Pt agreed to seek staff and report any intrusive thoughts of hurting self or others. Pt remains free from any self-harm, safe environment maintained. Regular rounding 4 times an hour and random patient checks done for safety and per unit policy.        Problem: Anxiety  Goal: Will report anxiety at manageable levels  Description: INTERVENTIONS:  1. Administer medication as ordered  2. Teach and rehearse alternative coping skills  3. Provide emotional support with 1:1 interaction with staff  4/11/2025 2216 by Emelina Zabala, RN  Outcome: Progressing  Flowsheets (Taken 4/11/2025 2215)  Will report anxiety at manageable levels:   Teach and rehearse alternative coping skills   Provide emotional support with 1:1 interaction with staff  Note: Pt denies any suicidal or homicidal ideations, denies any hallucinations. Pt agreed to seek staff and report any intrusive thoughts of hurting self or others. Pt remains free from any self-harm, safe environment maintained. Regular rounding 4 times an hour and random patient checks done for safety and per unit policy.        Problem: Risk for Elopement  Goal: Patient will not exit the unit/facility without proper

## 2025-04-12 NOTE — PLAN OF CARE
Problem: Risk for Elopement  Goal: Patient will not exit the unit/facility without proper excort  Outcome: Progressing  Note: Patient made no attempts to elope from unit      Problem: Self Harm/Suicidality  Goal: Will have no self-injury during hospital stay  Description: INTERVENTIONS:  1.  Ensure constant observer at bedside with Q15M safety checks  2.  Maintain a safe environment  3.  Secure patient belongings  4.  Ensure family/visitors adhere to safety recommendations  5.  Ensure safety tray has been added to patient's diet order  6.  Every shift and PRN: Re-assess suicidal risk via Frequent Screener    Outcome: Progressing  Note: Patient denies suicidal/homicidal ideation. Patient agreeable to seek out staff should thoughts of self harm/harming others arise. Patient denies hallucinations. Patient is positive for anxiety/depression but does not rate. Patient is cooperative, isolative to self, aloof of peers. Patient is medication compliant and behavior controlled. Comfort and reassurance provided. Safety checks maintained every 15 minutes and as needed.

## 2025-04-13 PROCEDURE — 99232 SBSQ HOSP IP/OBS MODERATE 35: CPT | Performed by: PSYCHIATRY & NEUROLOGY

## 2025-04-13 PROCEDURE — 6370000000 HC RX 637 (ALT 250 FOR IP): Performed by: INTERNAL MEDICINE

## 2025-04-13 PROCEDURE — 99231 SBSQ HOSP IP/OBS SF/LOW 25: CPT | Performed by: INTERNAL MEDICINE

## 2025-04-13 PROCEDURE — 1240000000 HC EMOTIONAL WELLNESS R&B

## 2025-04-13 PROCEDURE — 6370000000 HC RX 637 (ALT 250 FOR IP): Performed by: PSYCHIATRY & NEUROLOGY

## 2025-04-13 RX ADMIN — LEVOTHYROXINE SODIUM 150 MCG: 0.15 TABLET ORAL at 06:05

## 2025-04-13 RX ADMIN — FLUDROCORTISONE ACETATE 0.1 MG: 0.1 TABLET ORAL at 08:55

## 2025-04-13 RX ADMIN — HYDROCORTISONE 15 MG: 10 TABLET ORAL at 08:55

## 2025-04-13 RX ADMIN — VENLAFAXINE HYDROCHLORIDE 150 MG: 150 CAPSULE, EXTENDED RELEASE ORAL at 08:55

## 2025-04-13 ASSESSMENT — PAIN SCALES - GENERAL: PAINLEVEL_OUTOF10: 3

## 2025-04-13 NOTE — PLAN OF CARE
Problem: Self Harm/Suicidality  Goal: Will have no self-injury during hospital stay  Description: INTERVENTIONS:  1.  Ensure constant observer at bedside with Q15M safety checks  2.  Maintain a safe environment  3.  Secure patient belongings  4.  Ensure family/visitors adhere to safety recommendations  5.  Ensure safety tray has been added to patient's diet order  6.  Every shift and PRN: Re-assess suicidal risk via Frequent Screener    4/13/2025 0808 by Larisa Vera LPN  Flowsheets (Taken 4/13/2025 0808)  Will have no self-injury during hospital stay: Maintain a safe environment  Note: Patient reports fleeting thoughts of self harm, no plan. Patient commits to safety on the unit, and seeking help from staff when needed. Instructed on and encouraged to spend time in the milieu and participating in programming. Environment maintained for safety, visual checks maintained.     Problem: Anxiety  Goal: Will report anxiety at manageable levels  Description: INTERVENTIONS:  1. Administer medication as ordered  2. Teach and rehearse alternative coping skills  3. Provide emotional support with 1:1 interaction with staff  4/13/2025 0808 by Larisa Vera LPN  Outcome: Progressing  Flowsheets (Taken 4/13/2025 0808)  Will report anxiety at manageable levels:   Teach and rehearse alternative coping skills   Provide emotional support with 1:1 interaction with staff  Note: Patient denies anxiety at this time, will continue t monitor.

## 2025-04-13 NOTE — PLAN OF CARE
Problem: Self Harm/Suicidality  Goal: Will have no self-injury during hospital stay  Description: INTERVENTIONS:  1.  Ensure constant observer at bedside with Q15M safety checks  2.  Maintain a safe environment  3.  Secure patient belongings  4.  Ensure family/visitors adhere to safety recommendations  5.  Ensure safety tray has been added to patient's diet order  6.  Every shift and PRN: Re-assess suicidal risk via Frequent Screener    4/12/2025 2134 by Elizabeth Sarkar LPN  Outcome: Progressing     Problem: Anxiety  Goal: Will report anxiety at manageable levels  Description: INTERVENTIONS:  1. Administer medication as ordered  2. Teach and rehearse alternative coping skills  3. Provide emotional support with 1:1 interaction with staff  Outcome: Progressing    Patient is approached in her room affect is flat and she is withdrawn during 1:1 talk time. Patient admits to fleeting suicidal ideations with no current plan, contracts for safety. Patient also rates her anxiety 7/10 at this time. Patient is encouraged to participate in programming to explore coping skills, patient verbalized understanding. Patient denies need for any as needed medications when offered. Patient safety checks are maintained every 15 minutes.

## 2025-04-14 PROCEDURE — 6370000000 HC RX 637 (ALT 250 FOR IP): Performed by: INTERNAL MEDICINE

## 2025-04-14 PROCEDURE — 6370000000 HC RX 637 (ALT 250 FOR IP): Performed by: PSYCHIATRY & NEUROLOGY

## 2025-04-14 PROCEDURE — 1240000000 HC EMOTIONAL WELLNESS R&B

## 2025-04-14 PROCEDURE — 99232 SBSQ HOSP IP/OBS MODERATE 35: CPT | Performed by: PSYCHIATRY & NEUROLOGY

## 2025-04-14 RX ADMIN — Medication 6 MG: at 20:46

## 2025-04-14 RX ADMIN — FLUDROCORTISONE ACETATE 0.1 MG: 0.1 TABLET ORAL at 08:25

## 2025-04-14 RX ADMIN — LEVOTHYROXINE SODIUM 150 MCG: 0.15 TABLET ORAL at 06:23

## 2025-04-14 RX ADMIN — HYDROCORTISONE 15 MG: 10 TABLET ORAL at 08:25

## 2025-04-14 RX ADMIN — VENLAFAXINE HYDROCHLORIDE 187.5 MG: 150 CAPSULE, EXTENDED RELEASE ORAL at 08:26

## 2025-04-14 ASSESSMENT — PAIN SCALES - GENERAL: PAINLEVEL_OUTOF10: 0

## 2025-04-14 NOTE — PLAN OF CARE
Problem: Self Harm/Suicidality  Goal: Will have no self-injury during hospital stay  Description: INTERVENTIONS:  1.  Ensure constant observer at bedside with Q15M safety checks  2.  Maintain a safe environment  3.  Secure patient belongings  4.  Ensure family/visitors adhere to safety recommendations  5.  Ensure safety tray has been added to patient's diet order  6.  Every shift and PRN: Re-assess suicidal risk via Frequent Screener    4/13/2025 2127 by Theo Marroquin LPN  Outcome: Progressing     Problem: Anxiety  Goal: Will report anxiety at manageable levels  Description: INTERVENTIONS:  1. Administer medication as ordered  2. Teach and rehearse alternative coping skills  3. Provide emotional support with 1:1 interaction with staff  4/13/2025 2127 by Theo Marroquin LPN  Outcome: Progressing     Patient denies any thoughts to self harm at this time. Patient expresses feeling symptoms of anxiety 8/10. Patient is withdrawn and guarded during 1:1 conversation, remains isolative to room, coming out for snack only. Safety checks continued Q15 minutes.

## 2025-04-14 NOTE — BH NOTE
Patient refused to attend wrap-up Group after encouragement from staff. 1:1 talk time offered but patient refused.

## 2025-04-14 NOTE — PLAN OF CARE
Problem: Anxiety  Goal: Will report anxiety at manageable levels  Description: INTERVENTIONS:  1. Administer medication as ordered  2. Teach and rehearse alternative coping skills  3. Provide emotional support with 1:1 interaction with staff  Outcome: Progressing   1:1 with pt x ten minutes.  Pt encouraged to attend unit programming and interact with peers and staff.  Pt also encouraged to tend to hygiene and ADLs.  Pt encouraged to discuss feelings with staff and feedback and reassurance provided.    Pt denies thoughts of self harm and is agreeable to seeking out should thoughts of self harm arise.  Safe environment maintained.  Q15 minute checks for safety cont per unit policy.  Will cont to monitor for safety and provides support and reassurance as needed.    Pt admits to anxiety. Seclusive to room for long intervals. Attends select groups. Out for needs. Compliant with medications.

## 2025-04-15 PROCEDURE — APPSS30 APP SPLIT SHARED TIME 16-30 MINUTES: Performed by: NURSE PRACTITIONER

## 2025-04-15 PROCEDURE — 99232 SBSQ HOSP IP/OBS MODERATE 35: CPT | Performed by: PSYCHIATRY & NEUROLOGY

## 2025-04-15 PROCEDURE — 6370000000 HC RX 637 (ALT 250 FOR IP): Performed by: INTERNAL MEDICINE

## 2025-04-15 PROCEDURE — 6370000000 HC RX 637 (ALT 250 FOR IP): Performed by: PSYCHIATRY & NEUROLOGY

## 2025-04-15 PROCEDURE — 1240000000 HC EMOTIONAL WELLNESS R&B

## 2025-04-15 RX ORDER — LANOLIN ALCOHOL/MO/W.PET/CERES
1000 CREAM (GRAM) TOPICAL DAILY
Status: DISCONTINUED | OUTPATIENT
Start: 2025-04-15 | End: 2025-04-23 | Stop reason: HOSPADM

## 2025-04-15 RX ORDER — PANTOPRAZOLE SODIUM 40 MG/1
40 TABLET, DELAYED RELEASE ORAL
Status: DISCONTINUED | OUTPATIENT
Start: 2025-04-16 | End: 2025-04-23 | Stop reason: HOSPADM

## 2025-04-15 RX ORDER — FOLIC ACID 1 MG/1
1 TABLET ORAL DAILY
Status: DISCONTINUED | OUTPATIENT
Start: 2025-04-15 | End: 2025-04-23 | Stop reason: HOSPADM

## 2025-04-15 RX ORDER — TRAZODONE HYDROCHLORIDE 100 MG/1
100 TABLET ORAL NIGHTLY
Status: DISCONTINUED | OUTPATIENT
Start: 2025-04-15 | End: 2025-04-23 | Stop reason: HOSPADM

## 2025-04-15 RX ADMIN — FOLIC ACID 1 MG: 1 TABLET ORAL at 16:03

## 2025-04-15 RX ADMIN — LEVOTHYROXINE SODIUM 150 MCG: 0.15 TABLET ORAL at 06:27

## 2025-04-15 RX ADMIN — VENLAFAXINE HYDROCHLORIDE 187.5 MG: 150 CAPSULE, EXTENDED RELEASE ORAL at 08:33

## 2025-04-15 RX ADMIN — FLUDROCORTISONE ACETATE 0.1 MG: 0.1 TABLET ORAL at 08:33

## 2025-04-15 RX ADMIN — CYANOCOBALAMIN TAB 1000 MCG 1000 MCG: 1000 TAB at 16:03

## 2025-04-15 RX ADMIN — HYDROCORTISONE 15 MG: 10 TABLET ORAL at 08:32

## 2025-04-15 ASSESSMENT — PAIN SCALES - GENERAL: PAINLEVEL_OUTOF10: 0

## 2025-04-15 NOTE — PLAN OF CARE
Problem: Risk for Elopement  Goal: Patient will not exit the unit/facility without proper excort  4/14/2025 2211 by Theo Marroquin LPN  Outcome: Progressing     Problem: Self Harm/Suicidality  Goal: Will have no self-injury during hospital stay  Description: INTERVENTIONS:  1.  Ensure constant observer at bedside with Q15M safety checks  2.  Maintain a safe environment  3.  Secure patient belongings  4.  Ensure family/visitors adhere to safety recommendations  5.  Ensure safety tray has been added to patient's diet order  6.  Every shift and PRN: Re-assess suicidal risk via Frequent Screener    4/14/2025 2211 by Theo Marroquin LPN  Outcome: Progressing     Problem: Anxiety  Goal: Will report anxiety at manageable levels  Description: INTERVENTIONS:  1. Administer medication as ordered  2. Teach and rehearse alternative coping skills  3. Provide emotional support with 1:1 interaction with staff  4/14/2025 2211 by Theo Marroquin LPN  Outcome: Progressing      Patient denies any thoughts to self harm at this time. Patient expresses feeling symptoms of anxiety but does not rate 1/10, remains isolative to room, coming out for snack only. Safety checks continued Q15 minutes.

## 2025-04-15 NOTE — PLAN OF CARE
Problem: Risk for Elopement  Goal: Patient will not exit the unit/facility without proper excort  Outcome: Progressing  Flowsheets (Taken 4/15/2025 1010)  Nursing Interventions for Elopement Risk:   Make sure patient has all necessary personal care items   Place patient in room far away from exits and stairways   Reduce environmental triggers     Problem: Self Harm/Suicidality  Goal: Will have no self-injury during hospital stay  Description: INTERVENTIONS:  1.  Ensure constant observer at bedside with Q15M safety checks  2.  Maintain a safe environment  3.  Secure patient belongings  4.  Ensure family/visitors adhere to safety recommendations  5.  Ensure safety tray has been added to patient's diet order  6.  Every shift and PRN: Re-assess suicidal risk via Frequent Screener    Outcome: Progressing  Flowsheets (Taken 4/15/2025 1010)  Will have no self-injury during hospital stay: Maintain a safe environment     Problem: Anxiety  Goal: Will report anxiety at manageable levels  Description: INTERVENTIONS:  1. Administer medication as ordered  2. Teach and rehearse alternative coping skills  3. Provide emotional support with 1:1 interaction with staff  Outcome: Progressing  Flowsheets (Taken 4/15/2025 1010)  Will report anxiety at manageable levels:   Administer medication as ordered   Teach and rehearse alternative coping skills     Problem: Pain  Goal: Verbalizes/displays adequate comfort level or baseline comfort level  Outcome: Progressing   Patient can contract for safety.  Admits to depression and anxiety but has no intention of acting on them.  Patient is pleasant but isolative.

## 2025-04-16 PROCEDURE — APPSS30 APP SPLIT SHARED TIME 16-30 MINUTES

## 2025-04-16 PROCEDURE — 6370000000 HC RX 637 (ALT 250 FOR IP): Performed by: PSYCHIATRY & NEUROLOGY

## 2025-04-16 PROCEDURE — 99232 SBSQ HOSP IP/OBS MODERATE 35: CPT | Performed by: PSYCHIATRY & NEUROLOGY

## 2025-04-16 PROCEDURE — 1240000000 HC EMOTIONAL WELLNESS R&B

## 2025-04-16 PROCEDURE — 6370000000 HC RX 637 (ALT 250 FOR IP): Performed by: INTERNAL MEDICINE

## 2025-04-16 RX ORDER — HYDROCORTISONE 10 MG/1
15 TABLET ORAL DAILY
Status: DISCONTINUED | OUTPATIENT
Start: 2025-04-17 | End: 2025-04-23 | Stop reason: HOSPADM

## 2025-04-16 RX ADMIN — FOLIC ACID 1 MG: 1 TABLET ORAL at 08:09

## 2025-04-16 RX ADMIN — LEVOTHYROXINE SODIUM 150 MCG: 0.15 TABLET ORAL at 06:23

## 2025-04-16 RX ADMIN — HYDROCORTISONE 15 MG: 10 TABLET ORAL at 08:09

## 2025-04-16 RX ADMIN — FLUDROCORTISONE ACETATE 0.1 MG: 0.1 TABLET ORAL at 08:09

## 2025-04-16 RX ADMIN — VENLAFAXINE HYDROCHLORIDE 187.5 MG: 150 CAPSULE, EXTENDED RELEASE ORAL at 08:08

## 2025-04-16 RX ADMIN — CYANOCOBALAMIN TAB 1000 MCG 1000 MCG: 1000 TAB at 08:09

## 2025-04-16 RX ADMIN — PANTOPRAZOLE SODIUM 40 MG: 40 TABLET, DELAYED RELEASE ORAL at 06:23

## 2025-04-16 NOTE — PLAN OF CARE
[facility]  Weekly Interdisciplinary Treatment Plan Note     Review Date & Time: 4/16/2025   1245    Admission Type:   Admission Type: Voluntary    Reason for admission:  Reason for Admission: Patient reports suicidal ideation with plan to jump off bridge or run into traffic.    Estimated Length of Stay :  5-7 days  Estimated Discharge Date Update: to be determined by physician    PATIENT STRENGTHS:  Patient Strengths:   Patient Strengths and Limitations:Limitations: Tendency to isolate self, General negative or hopeless attitude about future/recovery, Multiple barriers to leisure interests, Difficulty problem solving/relies on others to help solve problems  Addictive Behavior:Addictive Behavior  In the Past 3 Months, Have You Felt or Has Someone Told You That You Have a Problem With  : None  Medical Problems:   Past Medical History:   Diagnosis Date    Adrenal insufficiency     Depression     Diabetes mellitus type 2, diet-controlled (HCC)     Headache     Hypothyroidism     MEN (multiple endocrine neoplasia) (HCC)        Risk:  Fall Risk   Bart Scale Bart Scale Score: 22  BVC      Change in scores no. Changes to plan of Care no    Status EXAM:   Mental Status and Behavioral Exam  Normal: No  Level of Assistance: Independent/Self  Facial Expression: Flat  Affect: Blunt  Level of Consciousness: Alert  Frequency of Checks: 4 times per hour, close  Mood:Normal: No  Mood: Depressed, Anxious  Motor Activity:Normal: No  Motor Activity: Decreased  Eye Contact: Fair  Observed Behavior: Cooperative, Preoccupied  Sexual Misconduct History: Current - no  Preception: [place] to person, [place] to time, [place] to place, [place] to situation  Attention:Normal: Yes  Attention: Distractible  Thought Processes: Unremarkable  Thought Content:Normal: No  Thought Content: Preoccupations  Depression Symptoms: Impaired concentration, Isolative  Anxiety Symptoms: Generalized  Marni Symptoms: No problems reported or

## 2025-04-16 NOTE — PLAN OF CARE
Problem: Self Harm/Suicidality  Goal: Will have no self-injury during hospital stay  Description: INTERVENTIONS:  1.  Ensure constant observer at bedside with Q15M safety checks  2.  Maintain a safe environment  3.  Secure patient belongings  4.  Ensure family/visitors adhere to safety recommendations  5.  Ensure safety tray has been added to patient's diet order  6.  Every shift and PRN: Re-assess suicidal risk via Frequent Screener    4/16/2025 0908 by Katherine Garcia RN  Outcome: Progressing  Note: Patient endorses fleeting thoughts of self harm, patient does contract for safety on the unit and is agreeable to alert staff of changes in thoughts and mood. Patient is calm and cooperative with assessment and compliant with medications,.   4/15/2025 2348 by Gordo Cali RN  Outcome: Progressing  Note: Patient denies thoughts to harm self stating \"not at the moment\". Patient is able to contract for safety while on the unit. Patient was cooperative with assessment, she reports her mood is depressed and rates her depression a 6/10 with 10 being the worst. No exit seeking behaviors observed. Patient declined the need for sleeping aides. Patient spent most of the evening reading books isolative to self. Safe environment maintained, will continue to offer support.      Problem: Anxiety  Goal: Will report anxiety at manageable levels  Description: INTERVENTIONS:  1. Administer medication as ordered  2. Teach and rehearse alternative coping skills  3. Provide emotional support with 1:1 interaction with staff  4/16/2025 0908 by Katherine Garcia RN  Outcome: Progressing  Note: Patient endorses symptoms of anxiety, rating her anxiety 9 ou of 10 with 10 being the most severe. Patient is calm and cooperative and compliant with medications,.   4/15/2025 2348 by Gordo Cali RN  Outcome: Progressing     Problem: Pain  Goal: Verbalizes/displays adequate comfort level or baseline comfort level  4/16/2025 0908 by Radha

## 2025-04-16 NOTE — PLAN OF CARE
Problem: Anxiety  Goal: Will report anxiety at manageable levels  Description: INTERVENTIONS:  1. Administer medication as ordered  2. Teach and rehearse alternative coping skills  3. Provide emotional support with 1:1 interaction with staff  4/15/2025 2348 by Gordo Cali RN  Outcome: Progressing     Problem: Pain  Goal: Verbalizes/displays adequate comfort level or baseline comfort level  4/15/2025 2348 by Gordo Cali RN  Outcome: Progressing     Problem: Risk for Elopement  Goal: Patient will not exit the unit/facility without proper excort  4/15/2025 2348 by Gordo Cali RN  Outcome: Progressing     Problem: Self Harm/Suicidality  Goal: Will have no self-injury during hospital stay  Description: INTERVENTIONS:  1.  Ensure constant observer at bedside with Q15M safety checks  2.  Maintain a safe environment  3.  Secure patient belongings  4.  Ensure family/visitors adhere to safety recommendations  5.  Ensure safety tray has been added to patient's diet order  6.  Every shift and PRN: Re-assess suicidal risk via Frequent Screener    4/15/2025 2348 by Gordo Cali RN  Outcome: Progressing  Note: Patient denies thoughts to harm self stating \"not at the moment\". Patient is able to contract for safety while on the unit. Patient was cooperative with assessment, she reports her mood is depressed and rates her depression a 6/10 with 10 being the worst. No exit seeking behaviors observed. Patient declined the need for sleeping aides. Patient spent most of the evening reading books isolative to self. Safe environment maintained, will continue to offer support.

## 2025-04-17 PROCEDURE — 6370000000 HC RX 637 (ALT 250 FOR IP): Performed by: PSYCHIATRY & NEUROLOGY

## 2025-04-17 PROCEDURE — 6370000000 HC RX 637 (ALT 250 FOR IP): Performed by: INTERNAL MEDICINE

## 2025-04-17 PROCEDURE — 99231 SBSQ HOSP IP/OBS SF/LOW 25: CPT | Performed by: INTERNAL MEDICINE

## 2025-04-17 PROCEDURE — 1240000000 HC EMOTIONAL WELLNESS R&B

## 2025-04-17 PROCEDURE — 99232 SBSQ HOSP IP/OBS MODERATE 35: CPT | Performed by: PSYCHIATRY & NEUROLOGY

## 2025-04-17 PROCEDURE — 90833 PSYTX W PT W E/M 30 MIN: CPT | Performed by: PSYCHIATRY & NEUROLOGY

## 2025-04-17 PROCEDURE — APPSS30 APP SPLIT SHARED TIME 16-30 MINUTES

## 2025-04-17 RX ORDER — CLOZAPINE 25 MG/1
25 TABLET ORAL NIGHTLY
Status: DISCONTINUED | OUTPATIENT
Start: 2025-04-17 | End: 2025-04-18

## 2025-04-17 RX ADMIN — PANTOPRAZOLE SODIUM 40 MG: 40 TABLET, DELAYED RELEASE ORAL at 06:13

## 2025-04-17 RX ADMIN — FOLIC ACID 1 MG: 1 TABLET ORAL at 08:32

## 2025-04-17 RX ADMIN — FLUDROCORTISONE ACETATE 0.1 MG: 0.1 TABLET ORAL at 08:32

## 2025-04-17 RX ADMIN — HYDROCORTISONE 15 MG: 10 TABLET ORAL at 08:33

## 2025-04-17 RX ADMIN — VENLAFAXINE HYDROCHLORIDE 225 MG: 150 CAPSULE, EXTENDED RELEASE ORAL at 08:31

## 2025-04-17 RX ADMIN — LEVOTHYROXINE SODIUM 150 MCG: 0.15 TABLET ORAL at 06:13

## 2025-04-17 RX ADMIN — CYANOCOBALAMIN TAB 1000 MCG 1000 MCG: 1000 TAB at 08:32

## 2025-04-17 NOTE — PLAN OF CARE
Problem: Self Harm/Suicidality  Goal: Will have no self-injury during hospital stay  Description: INTERVENTIONS:  1.  Ensure constant observer at bedside with Q15M safety checks  2.  Maintain a safe environment  3.  Secure patient belongings  4.  Ensure family/visitors adhere to safety recommendations  5.  Ensure safety tray has been added to patient's diet order  6.  Every shift and PRN: Re-assess suicidal risk via Frequent Screener    Outcome: Progressing  Note: Patient denies thoughts of self-harm or harm to others during this shift. Staff encouraged patient to notify staff if thoughts of self-harm or harm to others occur. Staff ensure patient safety by intermediate checks for every 15 minutes.          Problem: Anxiety  Goal: Will report anxiety at manageable levels  Description: INTERVENTIONS:  1. Administer medication as ordered  2. Teach and rehearse alternative coping skills  3. Provide emotional support with 1:1 interaction with staff  Outcome: Progressing  Note: Patient denies feelings of depression & anxiety. Staff ensure safety by providing safety checks on the unit intermittently and every 15 minutes. Patient is encouraged to notify staff if anxiety and depression occurs.          Problem: Pain  Goal: Verbalizes/displays adequate comfort level or baseline comfort level  Outcome: Progressing  Note: Patient denies pain. Patient is aware medications can be given upon request. Patient is oriented to notify staff of changes regarding pain. Staff continues to monitor patient.

## 2025-04-17 NOTE — CARE COORDINATION
Discharge Arrangements:      Guardian notified: n/a    Discharge destination/address: discharge home- address on facesheet    Transported by: cab     Patient was not accepting of referral.  Follow up appointment is scheduled for 4/24 at 8:15AM at [facility]    *RYLIE resources were offered to patient throughout admission and at time of discharge. This list of [facility] RYLIE providers was provided to patient:     [facility] [place] [facility]  [address] [facility] [address]   [address] [facility] [address]  Phone: [phone]     Phone: [phone]    [facility]  [place]   [address] [facility] [address]   [address] [facility] [address]  Phone: [phone]     Phone: [phone]    [facility]  [address] [facility] [address]    [address] [facility] [facility] [address]  Phone: [phone]     Phone: [phone]    [facility]     [facility]   [address] [facility] [address]  [place] [address]      Phone: [phone]  Phone: [phone]        [facility]  [address] [facility] [address]   [address] [place] [address]  Phone: [phone]     Phone:  [phone]    [employer]      [facility]  [address] [facility] [address]   [address] [facility] [address]  Phone: [phone]     Phone: [phone]    [facility], [facility]  [address] [facility] [address]   [address] [facility] [address]  Phone: [phone]     Phone: [phone]    [facility]  [facility]  [address] [facility] [address] [address] [facility] [address]  Phone: [phone]     Phone: [phone]    [facility]     [facility]  [address] [place] [address] [address] [place] [address] [address] [facility] [address]  Phone:

## 2025-04-17 NOTE — PLAN OF CARE
Problem: Self Harm/Suicidality  Goal: Will have no self-injury during hospital stay  Description: INTERVENTIONS:  1.  Ensure constant observer at bedside with Q15M safety checks  2.  Maintain a safe environment  3.  Secure patient belongings  4.  Ensure family/visitors adhere to safety recommendations  5.  Ensure safety tray has been added to patient's diet order  6.  Every shift and PRN: Re-assess suicidal risk via Frequent Screener    4/17/2025 1033 by Jaimie Gomes, NIRANJAN  Note: Ms. Esquivel reports having fleeting suicidal ideation and does contract for safety. She reports not desire or intention on acting on these thoughts and denies a plan.      Problem: Anxiety  Goal: Will report anxiety at manageable levels  Description: INTERVENTIONS:  1. Administer medication as ordered  2. Teach and rehearse alternative coping skills  3. Provide emotional support with 1:1 interaction with staff  4/17/2025 1033 by Jaimie Gomes, NIRANJAN  Note: Ms. Esquivel reports her anxiety is manageable at this time. She spends most of this shift resting in bed and comes out for meals and needs.      Problem: Pain  Goal: Verbalizes/displays adequate comfort level or baseline comfort level  4/17/2025 1033 by Jaimie Gomes, NIRANJAN  Note: Ms. Esquivel denies pain and discomfort during this shift.

## 2025-04-18 LAB
BASOPHILS # BLD: 0 K/UL (ref 0–0.2)
BASOPHILS NFR BLD: 0 % (ref 0–2)
EOSINOPHIL # BLD: 0.19 K/UL (ref 0–0.4)
EOSINOPHILS RELATIVE PERCENT: 2 % (ref 0–4)
ERYTHROCYTE [DISTWIDTH] IN BLOOD BY AUTOMATED COUNT: 14.6 % (ref 11.5–14.9)
HCT VFR BLD AUTO: 37.9 % (ref 36–46)
HGB BLD-MCNC: 12.9 G/DL (ref 12–16)
LYMPHOCYTES NFR BLD: 3.23 K/UL (ref 1–4.8)
LYMPHOCYTES RELATIVE PERCENT: 34 % (ref 24–44)
MCH RBC QN AUTO: 30.4 PG (ref 26–34)
MCHC RBC AUTO-ENTMCNC: 34 G/DL (ref 31–37)
MCV RBC AUTO: 89.4 FL (ref 80–100)
MONOCYTES NFR BLD: 0.67 K/UL (ref 0.1–1.3)
MONOCYTES NFR BLD: 7 % (ref 1–7)
MORPHOLOGY: NORMAL
NEUTROPHILS NFR BLD: 57 % (ref 36–66)
NEUTS SEG NFR BLD: 5.41 K/UL (ref 1.3–9.1)
PLATELET # BLD AUTO: 326 K/UL (ref 150–450)
PMV BLD AUTO: 7 FL (ref 6–12)
RBC # BLD AUTO: 4.24 M/UL (ref 4–5.2)
WBC OTHER # BLD: 9.5 K/UL (ref 3.5–11)

## 2025-04-18 PROCEDURE — 85025 COMPLETE CBC W/AUTO DIFF WBC: CPT

## 2025-04-18 PROCEDURE — 6370000000 HC RX 637 (ALT 250 FOR IP): Performed by: PSYCHIATRY & NEUROLOGY

## 2025-04-18 PROCEDURE — 1240000000 HC EMOTIONAL WELLNESS R&B

## 2025-04-18 PROCEDURE — APPSS30 APP SPLIT SHARED TIME 16-30 MINUTES

## 2025-04-18 PROCEDURE — 6370000000 HC RX 637 (ALT 250 FOR IP): Performed by: INTERNAL MEDICINE

## 2025-04-18 PROCEDURE — 36415 COLL VENOUS BLD VENIPUNCTURE: CPT

## 2025-04-18 PROCEDURE — 99232 SBSQ HOSP IP/OBS MODERATE 35: CPT | Performed by: PSYCHIATRY & NEUROLOGY

## 2025-04-18 RX ORDER — CLOZAPINE 25 MG/1
12.5 TABLET ORAL 2 TIMES DAILY
Status: DISCONTINUED | OUTPATIENT
Start: 2025-04-18 | End: 2025-04-22

## 2025-04-18 RX ADMIN — FOLIC ACID 1 MG: 1 TABLET ORAL at 09:38

## 2025-04-18 RX ADMIN — HYDROXYZINE HYDROCHLORIDE 50 MG: 50 TABLET, FILM COATED ORAL at 20:55

## 2025-04-18 RX ADMIN — FLUDROCORTISONE ACETATE 0.1 MG: 0.1 TABLET ORAL at 09:38

## 2025-04-18 RX ADMIN — CLOZAPINE 12.5 MG: 25 TABLET ORAL at 20:55

## 2025-04-18 RX ADMIN — LEVOTHYROXINE SODIUM 150 MCG: 0.15 TABLET ORAL at 06:46

## 2025-04-18 RX ADMIN — HYDROCORTISONE 15 MG: 10 TABLET ORAL at 09:38

## 2025-04-18 RX ADMIN — CYANOCOBALAMIN TAB 1000 MCG 1000 MCG: 1000 TAB at 09:38

## 2025-04-18 RX ADMIN — VENLAFAXINE HYDROCHLORIDE 225 MG: 150 CAPSULE, EXTENDED RELEASE ORAL at 09:38

## 2025-04-18 RX ADMIN — PANTOPRAZOLE SODIUM 40 MG: 40 TABLET, DELAYED RELEASE ORAL at 06:46

## 2025-04-18 RX ADMIN — Medication 6 MG: at 20:55

## 2025-04-18 NOTE — PLAN OF CARE
Problem: Self Harm/Suicidality  Goal: Will have no self-injury during hospital stay  Description: INTERVENTIONS:  1.  Ensure constant observer at bedside with Q15M safety checks  2.  Maintain a safe environment  3.  Secure patient belongings  4.  Ensure family/visitors adhere to safety recommendations  5.  Ensure safety tray has been added to patient's diet order  6.  Every shift and PRN: Re-assess suicidal risk via Frequent Screener    Outcome: Progressing  Flowsheets (Taken 4/18/2025 0302)  Will have no self-injury during hospital stay:   Ensure constant observer at bedside with Q15M safety checks   Ensure family/visitors adhere to safety recommendations   Every shift and PRN: Re-assess suicidal risk via Frequent Screener   Maintain a safe environment   Ensure safety tray has been added to patient's diet order   Secure patient belongings  Note: Patient denies thoughts of self harm or harm to others during this shift. Staff encouraged patient to notify staff if thoughts of self harm or harm to others occur. Staff ensures patient safety by intermediate and safety checks every 15 minutes.       Problem: Anxiety  Goal: Will report anxiety at manageable levels  Description: INTERVENTIONS:  1. Administer medication as ordered  2. Teach and rehearse alternative coping skills  3. Provide emotional support with 1:1 interaction with staff  Outcome: Progressing  Flowsheets (Taken 4/18/2025 0302)  Will report anxiety at manageable levels:   Administer medication as ordered   Teach and rehearse alternative coping skills   Provide emotional support with 1:1 interaction with staff  Note: Patient remains free from harm to self or others but admits to continued anxiety and depression. Medications available upon request. Staff ensures safety by providing safety checks on the unit intermittently and every 15 minutes. Staff continues to provide a safe environment. Staff encouraged patient to notify if depression continues.

## 2025-04-18 NOTE — PLAN OF CARE
Problem: Self Harm/Suicidality  Goal: Will have no self-injury during hospital stay  Description: INTERVENTIONS:  1.  Ensure constant observer at bedside with Q15M safety checks  2.  Maintain a safe environment  3.  Secure patient belongings  4.  Ensure family/visitors adhere to safety recommendations  5.  Ensure safety tray has been added to patient's diet order  6.  Every shift and PRN: Re-assess suicidal risk via Frequent Screener    Outcome: Progressing     Problem: Anxiety  Goal: Will report anxiety at manageable levels  Description: INTERVENTIONS:  1. Administer medication as ordered  2. Teach and rehearse alternative coping skills  3. Provide emotional support with 1:1 interaction with staff  Outcome: Progressing     Problem: Pain  Goal: Verbalizes/displays adequate comfort level or baseline comfort level  Outcome: Progressing   Patient continues with anxiety and depression, denies thoughts of self harm 15 min safety checks continue.

## 2025-04-19 PROCEDURE — APPSS30 APP SPLIT SHARED TIME 16-30 MINUTES

## 2025-04-19 PROCEDURE — 1240000000 HC EMOTIONAL WELLNESS R&B

## 2025-04-19 PROCEDURE — 6370000000 HC RX 637 (ALT 250 FOR IP): Performed by: INTERNAL MEDICINE

## 2025-04-19 PROCEDURE — 6370000000 HC RX 637 (ALT 250 FOR IP): Performed by: PSYCHIATRY & NEUROLOGY

## 2025-04-19 RX ADMIN — CLOZAPINE 12.5 MG: 25 TABLET ORAL at 10:31

## 2025-04-19 RX ADMIN — HYDROCORTISONE 15 MG: 10 TABLET ORAL at 10:31

## 2025-04-19 RX ADMIN — VENLAFAXINE HYDROCHLORIDE 225 MG: 150 CAPSULE, EXTENDED RELEASE ORAL at 10:31

## 2025-04-19 RX ADMIN — HYDROXYZINE HYDROCHLORIDE 50 MG: 50 TABLET, FILM COATED ORAL at 21:29

## 2025-04-19 RX ADMIN — LEVOTHYROXINE SODIUM 150 MCG: 0.15 TABLET ORAL at 10:31

## 2025-04-19 RX ADMIN — CYANOCOBALAMIN TAB 1000 MCG 1000 MCG: 1000 TAB at 10:31

## 2025-04-19 RX ADMIN — CLOZAPINE 12.5 MG: 25 TABLET ORAL at 21:26

## 2025-04-19 RX ADMIN — PANTOPRAZOLE SODIUM 40 MG: 40 TABLET, DELAYED RELEASE ORAL at 06:51

## 2025-04-19 RX ADMIN — Medication 6 MG: at 21:27

## 2025-04-19 RX ADMIN — FOLIC ACID 1 MG: 1 TABLET ORAL at 10:31

## 2025-04-19 RX ADMIN — FLUDROCORTISONE ACETATE 0.1 MG: 0.1 TABLET ORAL at 10:31

## 2025-04-19 ASSESSMENT — PAIN SCALES - GENERAL: PAINLEVEL_OUTOF10: 0

## 2025-04-19 NOTE — PLAN OF CARE
Problem: Self Harm/Suicidality  Goal: Will have no self-injury during hospital stay  Description: INTERVENTIONS:  1.  Ensure constant observer at bedside with Q15M safety checks  2.  Maintain a safe environment  3.  Secure patient belongings  4.  Ensure family/visitors adhere to safety recommendations  5.  Ensure safety tray has been added to patient's diet order  6.  Every shift and PRN: Re-assess suicidal risk via Frequent Screener    4/19/2025 1135 by Katherine Garcia RN  Outcome: Progressing  Note: Patient does endorse fleeting thoughts of self harm at time of assessment. Patient contracts for safety and will alert staff of changes in mood, thoughts and mentation.   4/19/2025 1135 by Katherine Garcia RN  Outcome: Progressing     Problem: Anxiety  Goal: Will report anxiety at manageable levels  Description: INTERVENTIONS:  1. Administer medication as ordered  2. Teach and rehearse alternative coping skills  3. Provide emotional support with 1:1 interaction with staff  4/19/2025 1135 by Katherine Garcia RN  Outcome: Progressing  Note: Patient endorses high levels of anxiety rating it 8 out of 10 with 10 being the most severe. Patient is calm and cooperative with assessment and medications. She endorses sleeping well, is isolative to room and aloof of peers when in the dayroom.   4/19/2025 1135 by Katherine Garcia RN  Outcome: Progressing     Problem: Pain  Goal: Verbalizes/displays adequate comfort level or baseline comfort level  4/19/2025 1135 by Katherine Garcia RN  Outcome: Progressing  Note: Patient educated on 0-10 pain scale and PRN pain medications. Patient denies pain at time of assessment.   4/19/2025 1135 by Katherine Garcia RN  Outcome: Progressing

## 2025-04-19 NOTE — PLAN OF CARE
Problem: Self Harm/Suicidality  Goal: Will have no self-injury during hospital stay  Description: INTERVENTIONS:  1.  Ensure constant observer at bedside with Q15M safety checks  2.  Maintain a safe environment  3.  Secure patient belongings  4.  Ensure family/visitors adhere to safety recommendations  5.  Ensure safety tray has been added to patient's diet order  6.  Every shift and PRN: Re-assess suicidal risk via Frequent Screener    4/18/2025 2101 by Christin Lugo RN  Outcome: Progressing  Note: Patient reports having intermittent thoughts of self harm but remains safe from self injury. Safe environment ensured by staff. Rounding every 15 minutes and as needed. Will continue to monitor and offer support.     Problem: Anxiety  Goal: Will report anxiety at manageable levels  Description: INTERVENTIONS:  1. Administer medication as ordered  2. Teach and rehearse alternative coping skills  3. Provide emotional support with 1:1 interaction with staff  4/18/2025 2101 by Christin Lugo RN  Outcome: Not Progressing  Note: Patient reports anxiety at 9/10 on 0/10 scale. Attempted to explore feelings 1:1, patient not able to expand on feelings. Medication administered as ordered. Will continue to monitor and offer support.      Problem: Anxiety  Goal: Will report anxiety at manageable levels  Description: INTERVENTIONS:  1. Administer medication as ordered  2. Teach and rehearse alternative coping skills  3. Provide emotional support with 1:1 interaction with staff  4/18/2025 2101 by Christin Lugo RN  Outcome: Not Progressing  Note: Patient reports anxiety at 9/10 on 0/10 scale. Attempted to explore feelings 1:1, patient not able to expand on feelings. Medication administered as ordered. Will continue to monitor and offer support.   4/18/2025 1811 by Kendy Workman RN  Outcome: Progressing

## 2025-04-20 PROCEDURE — 6370000000 HC RX 637 (ALT 250 FOR IP): Performed by: PSYCHIATRY & NEUROLOGY

## 2025-04-20 PROCEDURE — 99232 SBSQ HOSP IP/OBS MODERATE 35: CPT

## 2025-04-20 PROCEDURE — 6370000000 HC RX 637 (ALT 250 FOR IP): Performed by: INTERNAL MEDICINE

## 2025-04-20 PROCEDURE — 1240000000 HC EMOTIONAL WELLNESS R&B

## 2025-04-20 RX ADMIN — VENLAFAXINE HYDROCHLORIDE 225 MG: 150 CAPSULE, EXTENDED RELEASE ORAL at 08:29

## 2025-04-20 RX ADMIN — HYDROXYZINE HYDROCHLORIDE 50 MG: 50 TABLET, FILM COATED ORAL at 21:21

## 2025-04-20 RX ADMIN — CYANOCOBALAMIN TAB 1000 MCG 1000 MCG: 1000 TAB at 08:29

## 2025-04-20 RX ADMIN — Medication 6 MG: at 21:20

## 2025-04-20 RX ADMIN — CLOZAPINE 12.5 MG: 25 TABLET ORAL at 08:29

## 2025-04-20 RX ADMIN — CLOZAPINE 12.5 MG: 25 TABLET ORAL at 21:21

## 2025-04-20 RX ADMIN — LEVOTHYROXINE SODIUM 150 MCG: 0.15 TABLET ORAL at 10:39

## 2025-04-20 RX ADMIN — PANTOPRAZOLE SODIUM 40 MG: 40 TABLET, DELAYED RELEASE ORAL at 07:06

## 2025-04-20 RX ADMIN — FLUDROCORTISONE ACETATE 0.1 MG: 0.1 TABLET ORAL at 10:39

## 2025-04-20 RX ADMIN — HYDROCORTISONE 15 MG: 10 TABLET ORAL at 10:39

## 2025-04-20 RX ADMIN — FOLIC ACID 1 MG: 1 TABLET ORAL at 08:29

## 2025-04-20 NOTE — PLAN OF CARE
Problem: Self Harm/Suicidality  Goal: Will have no self-injury during hospital stay  Description: INTERVENTIONS:  1.  Ensure constant observer at bedside with Q15M safety checks  2.  Maintain a safe environment  3.  Secure patient belongings  4.  Ensure family/visitors adhere to safety recommendations  5.  Ensure safety tray has been added to patient's diet order  6.  Every shift and PRN: Re-assess suicidal risk via Frequent Screener    Outcome: Progressing  Flowsheets (Taken 4/20/2025 0146)  Will have no self-injury during hospital stay:   Maintain a safe environment   Every shift and PRN: Re-assess suicidal risk via Frequent Screener  Note: Pt denies any suicidal or homicidal ideations, denies any hallucinations. Pt agreed to seek staff and report any intrusive thoughts of hurting self or others.  Pt remains free from any self-harm, safe environment maintained. Regular rounding 4 times an hour and random patient checks done for safety and per unit policy.        Problem: Anxiety  Goal: Will report anxiety at manageable levels  Description: INTERVENTIONS:  1. Administer medication as ordered  2. Teach and rehearse alternative coping skills  3. Provide emotional support with 1:1 interaction with staff  Outcome: Progressing  Flowsheets (Taken 4/20/2025 0158)  Will report anxiety at manageable levels:   Administer medication as ordered   Provide emotional support with 1:1 interaction with staff   Teach and rehearse alternative coping skills  Note: Patient reports moderate anxiety, rates it at 8 on a scale of 0-10 (10 being the worst anxiety and 0 being no anxiety). Coping strategies explored with patients, emotional support and reassurance provided as needed. PRN anti-anxiety and sleep aid medications given to help patient relax and have a restful sleep.       Problem: Pain  Goal: Verbalizes/displays adequate comfort level or baseline comfort level  Outcome: Progressing  Flowsheets (Taken 4/20/2025

## 2025-04-20 NOTE — PLAN OF CARE
Problem: Self Harm/Suicidality  Goal: Will have no self-injury during hospital stay  Description: INTERVENTIONS:  1.  Ensure constant observer at bedside with Q15M safety checks  2.  Maintain a safe environment  3.  Secure patient belongings  4.  Ensure family/visitors adhere to safety recommendations  5.  Ensure safety tray has been added to patient's diet order  6.  Every shift and PRN: Re-assess suicidal risk via Frequent Screener    4/20/2025 0928 by Mariah Seals RN  Outcome: Progressing  Note: Patient reports fleeting suicidal ideations at time of assessment. Contracts and endorses safety while on the unit. Remains medication compliant and is calm and cooperative with assessments.   4/20/2025 0200 by Emelina Zabala RN  Outcome: Progressing  Flowsheets (Taken 4/20/2025 0146)  Will have no self-injury during hospital stay:   Maintain a safe environment   Every shift and PRN: Re-assess suicidal risk via Frequent Screener  Note: Pt denies any suicidal or homicidal ideations, denies any hallucinations. Pt agreed to seek staff and report any intrusive thoughts of hurting self or others.  Pt remains free from any self-harm, safe environment maintained. Regular rounding 4 times an hour and random patient checks done for safety and per unit policy.        Problem: Anxiety  Goal: Will report anxiety at manageable levels  Description: INTERVENTIONS:  1. Administer medication as ordered  2. Teach and rehearse alternative coping skills  3. Provide emotional support with 1:1 interaction with staff  4/20/2025 0928 by Mariah Seals RN  Outcome: Progressing  Note: Patient reports anxiety at time of assessment offered as needed medications for anxiety and declined at time of assessment. Remains medication compliant and is calm and cooperative with assessments.   4/20/2025 0200 by Emelina Zabala RN  Outcome: Progressing  Flowsheets (Taken 4/20/2025 0158)  Will report anxiety at manageable levels:

## 2025-04-21 PROCEDURE — 6370000000 HC RX 637 (ALT 250 FOR IP): Performed by: INTERNAL MEDICINE

## 2025-04-21 PROCEDURE — 99232 SBSQ HOSP IP/OBS MODERATE 35: CPT | Performed by: INTERNAL MEDICINE

## 2025-04-21 PROCEDURE — 6370000000 HC RX 637 (ALT 250 FOR IP): Performed by: PSYCHIATRY & NEUROLOGY

## 2025-04-21 PROCEDURE — APPSS30 APP SPLIT SHARED TIME 16-30 MINUTES

## 2025-04-21 PROCEDURE — 1240000000 HC EMOTIONAL WELLNESS R&B

## 2025-04-21 RX ADMIN — PANTOPRAZOLE SODIUM 40 MG: 40 TABLET, DELAYED RELEASE ORAL at 06:36

## 2025-04-21 RX ADMIN — CLOZAPINE 12.5 MG: 25 TABLET ORAL at 08:40

## 2025-04-21 RX ADMIN — Medication 6 MG: at 21:13

## 2025-04-21 RX ADMIN — VENLAFAXINE HYDROCHLORIDE 225 MG: 150 CAPSULE, EXTENDED RELEASE ORAL at 08:40

## 2025-04-21 RX ADMIN — HYDROCORTISONE 15 MG: 10 TABLET ORAL at 12:21

## 2025-04-21 RX ADMIN — FLUDROCORTISONE ACETATE 0.1 MG: 0.1 TABLET ORAL at 08:40

## 2025-04-21 RX ADMIN — CYANOCOBALAMIN TAB 1000 MCG 1000 MCG: 1000 TAB at 08:40

## 2025-04-21 RX ADMIN — LEVOTHYROXINE SODIUM 150 MCG: 0.15 TABLET ORAL at 06:36

## 2025-04-21 RX ADMIN — FOLIC ACID 1 MG: 1 TABLET ORAL at 08:40

## 2025-04-21 RX ADMIN — HYDROXYZINE HYDROCHLORIDE 50 MG: 50 TABLET, FILM COATED ORAL at 21:12

## 2025-04-21 RX ADMIN — CLOZAPINE 12.5 MG: 25 TABLET ORAL at 21:12

## 2025-04-21 NOTE — PLAN OF CARE
Problem: Self Harm/Suicidality  Goal: Will have no self-injury during hospital stay  Description: INTERVENTIONS:  1.  Ensure constant observer at bedside with Q15M safety checks  2.  Maintain a safe environment  3.  Secure patient belongings  4.  Ensure family/visitors adhere to safety recommendations  5.  Ensure safety tray has been added to patient's diet order  6.  Every shift and PRN: Re-assess suicidal risk via Frequent Screener    Outcome: Progressing  Note: Patient denies thoughts of SI/HI/self-harm. Patient agreed to seek out staff if thoughts of SI/HI/self-harm arise. Safe environment maintained. Q15 minute checks for safety continued per unit policy. Will continue to monitor for safety and provide support and reassurance as needed.      Problem: Anxiety  Goal: Will report anxiety at manageable levels  Description: INTERVENTIONS:  1. Administer medication as ordered  2. Teach and rehearse alternative coping skills  3. Provide emotional support with 1:1 interaction with staff  Outcome: Progressing  Note: Patient reports anxiety at this time. Effective coping behaviors explored with patient, emotional support and reassurance provided as needed. PRN anti-anxiety and sleep aid medications given to help patient relax and have a restful sleep.

## 2025-04-21 NOTE — PLAN OF CARE
Problem: Pain  Goal: Verbalizes/displays adequate comfort level or baseline comfort level  Outcome: Progressing     Problem: Anxiety  Goal: Will report anxiety at manageable levels  Description: INTERVENTIONS:  1. Administer medication as ordered  2. Teach and rehearse alternative coping skills  3. Provide emotional support with 1:1 interaction with staff  Outcome: Progressing     Problem: Self Harm/Suicidality  Goal: Will have no self-injury during hospital stay  Description: INTERVENTIONS:  1.  Ensure constant observer at bedside with Q15M safety checks  2.  Maintain a safe environment  3.  Secure patient belongings  4.  Ensure family/visitors adhere to safety recommendations  5.  Ensure safety tray has been added to patient's diet order  6.  Every shift and PRN: Re-assess suicidal risk via Frequent Screener    Outcome: Progressing  Note: Patient reports fleeting suicidal ideation, contracts for safety. Patient denies homicidal ideation. Patient agreeable to seek out staff should thoughts of self harm/harming others worsen/arise. Patient denies hallucinations. Patient is positive for anxiety/depression but does not rate. Patient is pleasant and cooperative, social with select peers, attends some groups. Patient is medication compliant and behavior controlled. Comfort and reassurance provided. Safety checks maintained every 15 minutes and as needed.

## 2025-04-22 PROCEDURE — 6370000000 HC RX 637 (ALT 250 FOR IP): Performed by: PSYCHIATRY & NEUROLOGY

## 2025-04-22 PROCEDURE — 99232 SBSQ HOSP IP/OBS MODERATE 35: CPT | Performed by: INTERNAL MEDICINE

## 2025-04-22 PROCEDURE — APPSS30 APP SPLIT SHARED TIME 16-30 MINUTES

## 2025-04-22 PROCEDURE — 1240000000 HC EMOTIONAL WELLNESS R&B

## 2025-04-22 PROCEDURE — 6370000000 HC RX 637 (ALT 250 FOR IP): Performed by: INTERNAL MEDICINE

## 2025-04-22 RX ORDER — CLOZAPINE 25 MG/1
12.5 TABLET ORAL 3 TIMES DAILY
Status: DISCONTINUED | OUTPATIENT
Start: 2025-04-22 | End: 2025-04-23 | Stop reason: HOSPADM

## 2025-04-22 RX ADMIN — HYDROXYZINE HYDROCHLORIDE 50 MG: 50 TABLET, FILM COATED ORAL at 21:03

## 2025-04-22 RX ADMIN — HYDROCORTISONE 15 MG: 10 TABLET ORAL at 08:18

## 2025-04-22 RX ADMIN — Medication 6 MG: at 21:02

## 2025-04-22 RX ADMIN — LEVOTHYROXINE SODIUM 150 MCG: 0.15 TABLET ORAL at 05:51

## 2025-04-22 RX ADMIN — CYANOCOBALAMIN TAB 1000 MCG 1000 MCG: 1000 TAB at 08:19

## 2025-04-22 RX ADMIN — CLOZAPINE 12.5 MG: 25 TABLET ORAL at 15:41

## 2025-04-22 RX ADMIN — CLOZAPINE 12.5 MG: 25 TABLET ORAL at 08:19

## 2025-04-22 RX ADMIN — PANTOPRAZOLE SODIUM 40 MG: 40 TABLET, DELAYED RELEASE ORAL at 06:01

## 2025-04-22 RX ADMIN — FLUDROCORTISONE ACETATE 0.1 MG: 0.1 TABLET ORAL at 08:19

## 2025-04-22 RX ADMIN — FOLIC ACID 1 MG: 1 TABLET ORAL at 08:18

## 2025-04-22 RX ADMIN — VENLAFAXINE HYDROCHLORIDE 225 MG: 150 CAPSULE, EXTENDED RELEASE ORAL at 08:18

## 2025-04-22 RX ADMIN — CLOZAPINE 12.5 MG: 25 TABLET ORAL at 21:01

## 2025-04-22 NOTE — PLAN OF CARE
Problem: Self Harm/Suicidality  Goal: Will have no self-injury during hospital stay  Description: INTERVENTIONS:  1.  Ensure constant observer at bedside with Q15M safety checks  2.  Maintain a safe environment  3.  Secure patient belongings  4.  Ensure family/visitors adhere to safety recommendations  5.  Ensure safety tray has been added to patient's diet order  6.  Every shift and PRN: Re-assess suicidal risk via Frequent Screener    4/22/2025 0145 by Abiola Uriarte, RN  Note: Pt free of self-harm, denies suicidal thoughts at this time, q 15 minute safety checks maintained.      Problem: Pain  Goal: Verbalizes/displays adequate comfort level or baseline comfort level  4/22/2025 0145 by Abiola Uriarte, RN  Outcome: Progressing  Note: Pt denies pain at this time 0/10, will continue to monitor for presence of pain throughout shift, q 15 minute safety checks maintained.

## 2025-04-22 NOTE — PLAN OF CARE
Problem: Anxiety  Goal: Will report anxiety at manageable levels  Description: INTERVENTIONS:  1. Administer medication as ordered  2. Teach and rehearse alternative coping skills  3. Provide emotional support with 1:1 interaction with staff  Outcome: Progressing     Problem: Pain  Goal: Verbalizes/displays adequate comfort level or baseline comfort level  4/22/2025 1234 by Josette Pinto LPN  Outcome: Progressing     Problem: Self Harm/Suicidality  Goal: Will have no self-injury during hospital stay  Description: INTERVENTIONS:  1.  Ensure constant observer at bedside with Q15M safety checks  2.  Maintain a safe environment  3.  Secure patient belongings  4.  Ensure family/visitors adhere to safety recommendations  5.  Ensure safety tray has been added to patient's diet order  6.  Every shift and PRN: Re-assess suicidal risk via Frequent Screener    4/22/2025 1234 by Josette Pinto LPN  Outcome: Progressing  Note: Patient reports fleeting suicidal ideation, contracts for safety. Patient denies homicidal ideation. Patient agreeable to seek out staff should thoughts of self harm/harming others worsen/arise. Patient denies hallucinations. Patient is positive for anxiety/depression but does not rate. Patient is pleasant and cooperative, social with select peers, attends some groups. Patient is medication compliant and behavior controlled. Comfort and reassurance provided. Safety checks maintained every 15 minutes and as needed.

## 2025-04-23 VITALS
RESPIRATION RATE: 15 BRPM | WEIGHT: 205 LBS | SYSTOLIC BLOOD PRESSURE: 96 MMHG | OXYGEN SATURATION: 97 % | HEART RATE: 76 BPM | BODY MASS INDEX: 28.7 KG/M2 | DIASTOLIC BLOOD PRESSURE: 56 MMHG | TEMPERATURE: 98.1 F | HEIGHT: 71 IN

## 2025-04-23 PROCEDURE — 6370000000 HC RX 637 (ALT 250 FOR IP): Performed by: PSYCHIATRY & NEUROLOGY

## 2025-04-23 PROCEDURE — 6370000000 HC RX 637 (ALT 250 FOR IP): Performed by: INTERNAL MEDICINE

## 2025-04-23 PROCEDURE — 99232 SBSQ HOSP IP/OBS MODERATE 35: CPT | Performed by: INTERNAL MEDICINE

## 2025-04-23 RX ORDER — VENLAFAXINE HYDROCHLORIDE 75 MG/1
225 CAPSULE, EXTENDED RELEASE ORAL
Qty: 30 CAPSULE | Refills: 3 | Status: CANCELLED | OUTPATIENT
Start: 2025-04-23

## 2025-04-23 RX ORDER — FLUDROCORTISONE ACETATE 0.1 MG/1
0.1 TABLET ORAL DAILY
Qty: 30 TABLET | Refills: 0 | Status: SHIPPED | OUTPATIENT
Start: 2025-04-23

## 2025-04-23 RX ORDER — CLOZAPINE 25 MG/1
12.5 TABLET ORAL 3 TIMES DAILY
Qty: 30 TABLET | Refills: 3 | Status: CANCELLED | OUTPATIENT
Start: 2025-04-23

## 2025-04-23 RX ORDER — VENLAFAXINE HYDROCHLORIDE 75 MG/1
225 CAPSULE, EXTENDED RELEASE ORAL
Qty: 30 CAPSULE | Refills: 3 | Status: SHIPPED | OUTPATIENT
Start: 2025-04-24

## 2025-04-23 RX ORDER — HYDROCORTISONE 5 MG/1
15 TABLET ORAL DAILY
Qty: 30 TABLET | Refills: 0 | Status: SHIPPED | OUTPATIENT
Start: 2025-04-24 | End: 2025-04-23

## 2025-04-23 RX ORDER — LEVOTHYROXINE SODIUM 150 UG/1
150 TABLET ORAL DAILY
Qty: 30 TABLET | Refills: 3 | Status: CANCELLED | OUTPATIENT
Start: 2025-04-23

## 2025-04-23 RX ORDER — LEVOTHYROXINE SODIUM 150 UG/1
150 TABLET ORAL DAILY
Qty: 30 TABLET | Refills: 3 | Status: SHIPPED | OUTPATIENT
Start: 2025-04-23

## 2025-04-23 RX ORDER — HYDROCORTISONE 5 MG/1
15 TABLET ORAL DAILY
Qty: 90 TABLET | Refills: 0 | Status: SHIPPED | OUTPATIENT
Start: 2025-04-24

## 2025-04-23 RX ORDER — CLOZAPINE 25 MG/1
12.5 TABLET ORAL 3 TIMES DAILY
Qty: 30 TABLET | Refills: 3 | Status: SHIPPED | OUTPATIENT
Start: 2025-04-23

## 2025-04-23 RX ORDER — OMEPRAZOLE 20 MG/1
20 CAPSULE, DELAYED RELEASE ORAL DAILY
Qty: 30 CAPSULE | Refills: 0 | Status: SHIPPED | OUTPATIENT
Start: 2025-04-23

## 2025-04-23 RX ADMIN — CLOZAPINE 12.5 MG: 25 TABLET ORAL at 09:20

## 2025-04-23 RX ADMIN — CYANOCOBALAMIN TAB 1000 MCG 1000 MCG: 1000 TAB at 09:19

## 2025-04-23 RX ADMIN — FOLIC ACID 1 MG: 1 TABLET ORAL at 09:20

## 2025-04-23 RX ADMIN — LEVOTHYROXINE SODIUM 150 MCG: 0.15 TABLET ORAL at 06:11

## 2025-04-23 RX ADMIN — HYDROCORTISONE 15 MG: 10 TABLET ORAL at 09:18

## 2025-04-23 RX ADMIN — FLUDROCORTISONE ACETATE 0.1 MG: 0.1 TABLET ORAL at 09:18

## 2025-04-23 RX ADMIN — PANTOPRAZOLE SODIUM 40 MG: 40 TABLET, DELAYED RELEASE ORAL at 06:11

## 2025-04-23 RX ADMIN — VENLAFAXINE HYDROCHLORIDE 225 MG: 150 CAPSULE, EXTENDED RELEASE ORAL at 09:19

## 2025-04-23 NOTE — PROGRESS NOTES
Behavioral Services                                              Medicare Re-Certification    I certify that the inpatient psychiatric hospital services furnished since the previous certification/re-certification were, and continue to be, medically necessary for;    [x] (1) Treatment which could reasonably be expected to improve the patient's condition,    [x] (2) Or for diagnostic study.    Estimated length of stay/service 5    Plan for post-hospital care home    This patient continues to need, on a daily basis, active treatment furnished directly by or requiring the supervision of inpatient psychiatric personnel.    Electronically signed by Janice Ochoa MD on 4/16/2025 at 7:05 PM   
      Behavioral Services  Medicare Certification Upon Admission    I certify that this patient's inpatient psychiatric hospital admission is medically necessary for:    [x] (1) Treatment which could reasonably be expected to improve this patient's condition,       [x] (2) Or for diagnostic study;     AND     [x](2) The inpatient psychiatric services are provided while the individual is under the care of a physician and are included in the individualized plan of care.    Estimated length of stay/service 7    Plan for post-hospital care home    Electronically signed by Janice Ochoa MD on 4/9/2025 at 8:59 AM      
    [facility] Internal Medicine  Johnathan Rosario MD; Otis Acosta MD, Yahir Valenzuela MD, Anne Segal MD, Dr Lisa Mccurdy MD   ; Rylie Garcia MD    [facility] Internal Medicine   IN-PATIENT SERVICE   [facility]  Progress note              Date:   4/22/2025  Patient name:  Yaritza Esquivel  Date of admission:  4/8/2025  9:52 PM  MRN:   [id number]  Account:  [id number]  YOB: 1975  PCP:    Jimena, Unspecified (Inactive)  Room:   [address] [place] [address]  Code Status:    Full Code      Chief Complaint:     Suicidal /Ac Psychosis    History Obtained From:     Patient/EMR/bedside RN     History of Present Illness:     Patient is 49-year-old female multiple comorbidities including hypothyroidism, Sneads's disease, family history of men 2 syndrome status post parathyroidectomy, bilateral adrenalectomy, on hormone replacement, has been admitted at [facility] for further management of depression suicidal ideation    Past Medical History:     Past Medical History:   Diagnosis Date    Adrenal insufficiency     Depression     Diabetes mellitus type 2, diet-controlled (HCC)     Headache     Hypothyroidism     MEN (multiple endocrine neoplasia) ([facility])         Past Surgical History:     Past Surgical History:   Procedure Laterality Date    THYROIDECTOMY      TONSILLECTOMY      TOTAL HIP ARTHROPLASTY Right         Medications Prior to Admission:     Prior to Admission medications    Medication Sig Start Date End Date Taking? Authorizing Provider   prazosin (MINIPRESS) 2 MG capsule Take 1 capsule by mouth nightly 3/31/25   David Avelar MD   risperiDONE (RISPERDAL M-TABS) 1 MG disintegrating tablet Take 1 tablet by mouth 2 times daily 3/31/25   David Avelar MD   venlafaxine (EFFEXOR XR) 150 MG extended release capsule Take 1 capsule by mouth daily (with breakfast) 4/1/25   David Avelar MD   folic acid (FOLVITE) 1 MG tablet Take 1 tablet by mouth daily 3/28/25   David Avelar MD 
    [facility] Internal Medicine  Johnathan Rosario MD; tOis Acosta MD, Yahir Valenzuela MD, Anne Segal MD, Dr Lisa Melgar MD; Rylie Garcia MD    [facility] Internal Medicine   IN-PATIENT SERVICE   [facility]     HISTORY AND PHYSICAL EXAMINATION            Date:   4/10/2025  Patient name:  Yaritza Esquivel  Date of admission:  4/8/2025  9:52 PM  MRN:   [id number]  Account:  [id number]  YOB: 1975  PCP:    C-Jacob, Unspecified (Inactive)  Room:   [address] [place] [address]  Code Status:    Full Code      Chief Complaint:     Suicidal /Ac Psychosis    History Obtained From:     Patient/EMR/bedside RN     History of Present Illness:     Patient is 49-year-old female multiple comorbidities including hypothyroidism, Oumar's disease, family history of men 2 syndrome status post parathyroidectomy, bilateral adrenalectomy, on hormone replacement, has been admitted at [facility] for further management of depression suicidal ideation    Past Medical History:     Past Medical History:   Diagnosis Date    Adrenal insufficiency     Depression     Diabetes mellitus type 2, diet-controlled (HCC)     Headache     Hypothyroidism     MEN (multiple endocrine neoplasia) (HCC)         Past Surgical History:     Past Surgical History:   Procedure Laterality Date    THYROIDECTOMY      TONSILLECTOMY      TOTAL HIP ARTHROPLASTY Right         Medications Prior to Admission:     Prior to Admission medications    Medication Sig Start Date End Date Taking? Authorizing Provider   prazosin (MINIPRESS) 2 MG capsule Take 1 capsule by mouth nightly 3/31/25   David Avelar MD   risperiDONE (RISPERDAL M-TABS) 1 MG disintegrating tablet Take 1 tablet by mouth 2 times daily 3/31/25   David Avelar MD   venlafaxine (EFFEXOR XR) 150 MG extended release capsule Take 1 capsule by mouth daily (with breakfast) 4/1/25   David Avelar MD   folic acid (FOLVITE) 1 MG tablet Take 1 tablet by mouth 
    [facility] Internal Medicine  Johnathan Rosario MD; Otis Acosta MD, Yahir Valenzuela MD, Anne Segal MD, Dr Lisa Mccurdy MD   ; Rylie Garcia MD    [facility] Internal Medicine   IN-PATIENT SERVICE   [facility]  Progress note              Date:   4/21/2025  Patient name:  Yaritza Esquivel  Date of admission:  4/8/2025  9:52 PM  MRN:   [id number]  Account:  [id number]  YOB: 1975  PCP:    Jimena, Unspecified (Inactive)  Room:   [address] [place] [address]  Code Status:    Full Code      Chief Complaint:     Suicidal /Ac Psychosis    History Obtained From:     Patient/EMR/bedside RN     History of Present Illness:     Patient is 49-year-old female multiple comorbidities including hypothyroidism, Satsuma's disease, family history of men 2 syndrome status post parathyroidectomy, bilateral adrenalectomy, on hormone replacement, has been admitted at [facility] for further management of depression suicidal ideation    Past Medical History:     Past Medical History:   Diagnosis Date    Adrenal insufficiency     Depression     Diabetes mellitus type 2, diet-controlled (HCC)     Headache     Hypothyroidism     MEN (multiple endocrine neoplasia) ([facility])         Past Surgical History:     Past Surgical History:   Procedure Laterality Date    THYROIDECTOMY      TONSILLECTOMY      TOTAL HIP ARTHROPLASTY Right         Medications Prior to Admission:     Prior to Admission medications    Medication Sig Start Date End Date Taking? Authorizing Provider   prazosin (MINIPRESS) 2 MG capsule Take 1 capsule by mouth nightly 3/31/25   David Avelar MD   risperiDONE (RISPERDAL M-TABS) 1 MG disintegrating tablet Take 1 tablet by mouth 2 times daily 3/31/25   David Avelar MD   venlafaxine (EFFEXOR XR) 150 MG extended release capsule Take 1 capsule by mouth daily (with breakfast) 4/1/25   David Avelar MD   folic acid (FOLVITE) 1 MG tablet Take 1 tablet by mouth daily 3/28/25   David Avelar MD 
    [facility] Internal Medicine  Johnathan Rosario MD; Otis Acosta MD, Yahir Valenzuela MD, Anne Segal MD, Dr Lisa Mccurdy MD   ; Rylie Garcia MD    [facility] Internal Medicine   IN-PATIENT SERVICE   [facility]  Progress note              Date:   4/17/2025  Patient name:  Yaritza Esquivel  Date of admission:  4/8/2025  9:52 PM  MRN:   [id number]  Account:  [id number]  YOB: 1975  PCP:    Jimena, Unspecified (Inactive)  Room:   [address] [place] [address]  Code Status:    Full Code      Chief Complaint:     Suicidal /Ac Psychosis    History Obtained From:     Patient/EMR/bedside RN     History of Present Illness:     Patient is 49-year-old female multiple comorbidities including hypothyroidism, [place]'s disease, family history of men 2 syndrome status post parathyroidectomy, bilateral adrenalectomy, on hormone replacement, has been admitted at [facility] for further management of depression suicidal ideation    Past Medical History:     Past Medical History:   Diagnosis Date    Adrenal insufficiency     Depression     Diabetes mellitus type 2, diet-controlled (HCC)     Headache     Hypothyroidism     MEN (multiple endocrine neoplasia) ([facility])         Past Surgical History:     Past Surgical History:   Procedure Laterality Date    THYROIDECTOMY      TONSILLECTOMY      TOTAL HIP ARTHROPLASTY Right         Medications Prior to Admission:     Prior to Admission medications    Medication Sig Start Date End Date Taking? Authorizing Provider   prazosin (MINIPRESS) 2 MG capsule Take 1 capsule by mouth nightly 3/31/25   David Avelar MD   risperiDONE (RISPERDAL M-TABS) 1 MG disintegrating tablet Take 1 tablet by mouth 2 times daily 3/31/25   David Avelar MD   venlafaxine (EFFEXOR XR) 150 MG extended release capsule Take 1 capsule by mouth daily (with breakfast) 4/1/25   David Avelar MD   folic acid (FOLVITE) 1 MG tablet Take 1 tablet by mouth daily 3/28/25   David Avelar MD 
    [facility] Internal Medicine  Johnathan Rosario MD; Otis Acosta MD, Yahir Valenzuela MD, Anne Segal MD, Dr Lisa Mccurdy MD   ; Rylie Garcia MD    [facility] Internal Medicine   IN-PATIENT SERVICE   [facility]     HISTORY AND PHYSICAL EXAMINATION            Date:   4/12/2025  Patient name:  Yaritza Esquivel  Date of admission:  4/8/2025  9:52 PM  MRN:   [id number]  Account:  [id number]  YOB: 1975  PCP:    CJarvis, Unspecified (Inactive)  Room:   [address] [place] [address]  Code Status:    Full Code      Chief Complaint:     Suicidal /Ac Psychosis    History Obtained From:     Patient/EMR/bedside RN     History of Present Illness:     Patient is 49-year-old female multiple comorbidities including hypothyroidism, [place]'s disease, family history of men 2 syndrome status post parathyroidectomy, bilateral adrenalectomy, on hormone replacement, has been admitted at [facility] for further management of depression suicidal ideation    Past Medical History:     Past Medical History:   Diagnosis Date    Adrenal insufficiency     Depression     Diabetes mellitus type 2, diet-controlled (HCC)     Headache     Hypothyroidism     MEN (multiple endocrine neoplasia) ([facility])         Past Surgical History:     Past Surgical History:   Procedure Laterality Date    THYROIDECTOMY      TONSILLECTOMY      TOTAL HIP ARTHROPLASTY Right         Medications Prior to Admission:     Prior to Admission medications    Medication Sig Start Date End Date Taking? Authorizing Provider   prazosin (MINIPRESS) 2 MG capsule Take 1 capsule by mouth nightly 3/31/25   David Avelar MD   risperiDONE (RISPERDAL M-TABS) 1 MG disintegrating tablet Take 1 tablet by mouth 2 times daily 3/31/25   David Avelar MD   venlafaxine (EFFEXOR XR) 150 MG extended release capsule Take 1 capsule by mouth daily (with breakfast) 4/1/25   David Avelar MD   folic acid (FOLVITE) 1 MG tablet Take 1 tablet by mouth daily 3/28/25   
    [facility] Internal Medicine  Johnathan Rosario MD; Otis Acosta MD, Yahir Valenzuela MD, Anne Segal MD, Dr Lisa Melgar MD; Rylie Garcia MD    [facility] Internal Medicine   IN-PATIENT SERVICE   [facility]     HISTORY AND PHYSICAL EXAMINATION            Date:   4/11/2025  Patient name:  Yaritza Esquivel  Date of admission:  4/8/2025  9:52 PM  MRN:   [id number]  Account:  [id number]  YOB: 1975  PCP:    C-Jacob, Unspecified (Inactive)  Room:   [address] [place] [address]  Code Status:    Full Code      Chief Complaint:     Suicidal /Ac Psychosis    History Obtained From:     Patient/EMR/bedside RN     History of Present Illness:     Patient is 49-year-old female multiple comorbidities including hypothyroidism, Oumar's disease, family history of men 2 syndrome status post parathyroidectomy, bilateral adrenalectomy, on hormone replacement, has been admitted at [facility] for further management of depression suicidal ideation    Past Medical History:     Past Medical History:   Diagnosis Date    Adrenal insufficiency     Depression     Diabetes mellitus type 2, diet-controlled (HCC)     Headache     Hypothyroidism     MEN (multiple endocrine neoplasia) (HCC)         Past Surgical History:     Past Surgical History:   Procedure Laterality Date    THYROIDECTOMY      TONSILLECTOMY      TOTAL HIP ARTHROPLASTY Right         Medications Prior to Admission:     Prior to Admission medications    Medication Sig Start Date End Date Taking? Authorizing Provider   prazosin (MINIPRESS) 2 MG capsule Take 1 capsule by mouth nightly 3/31/25   David Avelar MD   risperiDONE (RISPERDAL M-TABS) 1 MG disintegrating tablet Take 1 tablet by mouth 2 times daily 3/31/25   David Avelar MD   venlafaxine (EFFEXOR XR) 150 MG extended release capsule Take 1 capsule by mouth daily (with breakfast) 4/1/25   David Avelar MD   folic acid (FOLVITE) 1 MG tablet Take 1 tablet by mouth 
    [facility] Internal Medicine  Johnathan Rosario MD; Otis Acosta MD, Yahir Valenzuela MD, Anne Segal MD, Dr Lisa Mccurdy MD   ; Rylie Garcia MD    [facility] Internal Medicine   IN-PATIENT SERVICE   [facility]  Progress note              Date:   4/13/2025  Patient name:  Yaritza Esquivel  Date of admission:  4/8/2025  9:52 PM  MRN:   [id number]  Account:  [id number]  YOB: 1975  PCP:    Jimena, Unspecified (Inactive)  Room:   [address] [place] [address]  Code Status:    Full Code      Chief Complaint:     Suicidal /Ac Psychosis    History Obtained From:     Patient/EMR/bedside RN     History of Present Illness:     Patient is 49-year-old female multiple comorbidities including hypothyroidism, [place]'s disease, family history of men 2 syndrome status post parathyroidectomy, bilateral adrenalectomy, on hormone replacement, has been admitted at [facility] for further management of depression suicidal ideation    Past Medical History:     Past Medical History:   Diagnosis Date    Adrenal insufficiency     Depression     Diabetes mellitus type 2, diet-controlled (HCC)     Headache     Hypothyroidism     MEN (multiple endocrine neoplasia) ([facility])         Past Surgical History:     Past Surgical History:   Procedure Laterality Date    THYROIDECTOMY      TONSILLECTOMY      TOTAL HIP ARTHROPLASTY Right         Medications Prior to Admission:     Prior to Admission medications    Medication Sig Start Date End Date Taking? Authorizing Provider   prazosin (MINIPRESS) 2 MG capsule Take 1 capsule by mouth nightly 3/31/25   David Avelar MD   risperiDONE (RISPERDAL M-TABS) 1 MG disintegrating tablet Take 1 tablet by mouth 2 times daily 3/31/25   David Avelar MD   venlafaxine (EFFEXOR XR) 150 MG extended release capsule Take 1 capsule by mouth daily (with breakfast) 4/1/25   David Avelar MD   folic acid (FOLVITE) 1 MG tablet Take 1 tablet by mouth daily 3/28/25   David Avelar MD 
  Daily Progress Note  4/11/2025    Patient Name: Yaritza Esquivel    CHIEF COMPLAINT:  Depression with suicidal ideation with plans to run into traffic or jump off a bridge           SUBJECTIVE:    Patient was seen for follow-up assessment today.  She has been compliant with scheduled Effexor.  She has remained in behavioral control and has not required any emergency medications for agitation in the past 24 hours.  Nursing staff report patient is mostly isolative to her room and has not been attending groups.  Patient was resting in bed on approach but awake.  Her roommate was present and writer offered conversation to be held in interview room for privacy, but patient declined and wanted to stay in bed.  She presented as depressed and withdrawn.  Patient reports she continues to feel very helpless and hopeless.  She described her mood as \"exhausted\".  She describes suicidal ideation has fleeting and she could not contract for safety in the community.  Depression and anxiety remain high.  She admits to having very low motivation to get up and socialize.  She has been trying to occupy her time by reading in her room.  She was encouraged to attend groups as she may find this to be beneficial to improving her mood.  At this time patient remains at increased risk of harm to self and warrants further hospitalization for safety and stabilization.    Appetite:  [x] Adequate/Unchanged  [] Increased  [] Decreased      Sleep:       [] Adequate/Unchanged  [x] Fair  [] Poor      Group Attendance on Unit:   [] Yes   [] Selectively    [x] No    Compliant with scheduled medications: [x] Yes  [] No    Received emergency medications in past 24 hrs: [] Yes   [x] No    Medication Side Effects: Denies         Mental Status Exam  Level of consciousness: Alert and awake   Appearance: Appropriate attire for setting, resting in bed, with fair  grooming and hygiene   Behavior/Motor: Approachable, engages with interviewer, no psychomotor 
  Daily Progress Note  4/12/2025    Patient Name: Yaritza Esquivel    CHIEF COMPLAINT:  Depression with suicidal ideation with plans to run into traffic or jump off a bridge           SUBJECTIVE:    Patient was seen for follow-up assessment today.  She has been compliant with scheduled Effexor.  She has remained in behavioral control and has not required any emergency medications for agitation in the past 24 hours.  Nursing staff report patient is mostly isolative to her room and has not been attending groups.  Patient was resting in bed on approach but awake.  Patient reports her depression as 8.5 out of 10.  Her sleep was okay she has been eating okay.  Has been complaining about feeling very tired she was agreeable for medication changes however at this time plan to continue the current medications at the current dose.    Appetite:  [x] Adequate/Unchanged  [] Increased  [] Decreased      Sleep:       [] Adequate/Unchanged  [x] Fair  [] Poor      Group Attendance on Unit:   [] Yes   [] Selectively    [x] No    Compliant with scheduled medications: [x] Yes  [] No    Received emergency medications in past 24 hrs: [] Yes   [x] No    Medication Side Effects: Denies         Mental Status Exam  Level of consciousness: Alert and awake   Appearance: Appropriate attire for setting, resting in bed, with fair  grooming and hygiene   Behavior/Motor: Approachable, engages with interviewer, no psychomotor abnormalities   Attitude toward examiner: Cooperative, attentive, good eye contact  Speech: monotone and low productivity   Mood: Depressed, withdrawn  Affect: Blunted  Thought processes: linear and coherent   Thought content:  Denies homicidal ideation  Suicidal Ideation: Fleeting suicidal ideations, contracts for safety on the unit.   Delusions: No evidence of delusions.   Perceptual Disturbance: Denies AVH; patient does not appear to be responding to internal stimuli.   Cognition: Oriented to self, location, time, and 
  Daily Progress Note  4/16/2025    Patient Name: Yaritza Esquivel    CHIEF COMPLAINT:  Depression with suicidal ideation with plans to run into traffic or jump off a bridge           SUBJECTIVE:    Patient is seen today for a follow up assessment.  She is found sitting on bed reading a book.  She agrees to conduct interview in room with door open.  Roommate absent duration of encounter.  She endorses depressed mood rating intensity of the 9 out of 10 (with 10 is most severe).  She endorses feelings of helplessness and hopelessness.  She continues to struggle with irritability.  She endorses fleeting suicidal ideation.  She denies hallucinations or paranoia.  She reports good appetite and fair sleep quality.  She reports experiencing vivid dreams overnight.  She admits to isolating self to room as she finds it difficult to integrate into milieu activities and socialization with peers due to her irritability.  She is compliant with taking Effexor 187.5 mg daily.  She chooses not to utilize trazodone 100 mg for sleep stating \"I do not like the way it makes me feel\".  She is encouraged to utilize melatonin 6 mg nightly as needed.    At this time, patient continues to endorse significant depression and fleeting suicidal thoughts.  She requires inpatient hospitalization for safety and stabilization.  Medication management discharge planning per attending.    Appetite:  [x] Adequate/Unchanged  [] Increased  [] Decreased      Sleep:       [] Adequate/Unchanged  [x] Fair  [] Poor      Group Attendance on Unit:   [] Yes   [] Selectively    [x] No    Compliant with scheduled medications: [x] Yes  [] No    Received emergency medications in past 24 hrs: [] Yes   [x] No    Medication Side Effects: Denies         Mental Status Exam  Level of consciousness: Alert and awake   Appearance: Appropriate attire for setting, seated on bed, with fair  grooming and hygiene   Behavior/Motor: Approachable, engages with interviewer, no 
  Daily Progress Note  4/20/2025    Patient Name: Yaritza Esquivel    CHIEF COMPLAINT:  Depression with suicidal ideation with plans to run into traffic or jump off a bridge             SUBJECTIVE:    Patient is seen today for a follow up assessment. Patient states that she is \"here.\" She rates her depression a 9/10. She continues to have fleeting suicidal ideation. She is able to contract for safety with staff. She rates her anxiety a 2/10. She denies homicidal ideation and hallucinations. She reports sleeping \"good\" last night and denies issues with appetite. She reports that \"some\" of the medications are working and denies side effects. She remains at a risk to herself due to suicidal ideation. She does not yet demonstrate stability and would likely decompensate in the community. She requires continued inpatient hospitalization for safety and stabilization.     Appetite:  [x] Adequate/Unchanged  [] Increased  [] Decreased      Sleep:       [x] Adequate/Unchanged  [] Fair  [] Poor      Group Attendance on Unit:   [] Yes   [x] Selectively    [] No    Compliant with scheduled medications: [x] Yes  [] No    Received emergency medications in past 24 hrs: [] Yes   [x] No    Medication Side Effects: Denies         Mental Status Exam  Level of consciousness: Alert and awake   Appearance: Appropriate attire for setting, resting in bed, with fair  grooming and hygiene   Behavior/Motor: Approachable, engages with interviewer, no psychomotor abnormalities   Attitude toward examiner: Cooperative, attentive, good eye contact  Speech: normal rate, normal volume, and well articulated   Mood:  Depressed  Affect: Mood congruent   Thought processes: linear, goal directed, and coherent   Thought content: Denies homicidal ideation  Suicidal Ideation: Fleeting suicidal ideations, contracts for safety on the unit.   Delusions: No evidence of delusions.   Perceptual Disturbance: Patient does not appear to be responding to internal stimuli. 
  Daily Progress Note  4/22/2025    Patient Name: Yaritza Esquivel    CHIEF COMPLAINT:  Depression with suicidal ideation with plans to run into traffic or jump off a bridge                 SUBJECTIVE:    Patient is seen today for a follow up assessment.  Patient is found sitting up in bed reading a book.  She describes mood as \"okay\".  She endorses improvement of anxiety and libido of depression.  She does raise severity of depression 8.5 out of 10 (with 10 as most severe). She rates severity of anxiety as 7 out of 10 (with 10 as most severe). She endorses persistent fleeing suicidal ideation. She denies hallucinations or paranoia.  She is selectively social. She reports attending 3 groups yesterday. She reports poor sleep and nightmares.  She denies disturbance of appetite.  She is compliant with taking Effexor 225 mg by mouth daily.  She continues to decline use of trazodone 100 mg nightly for sleep.  She has recently increased to clozapine 12.5 mg 3 times daily and reports finding it helpful.  She has been without behavioral disturbances or need for emergency medications.  Medication management discharge planning per attending.    Appetite:  [x] Adequate/Unchanged  [] Increased  [] Decreased      Sleep:       [] Adequate/Unchanged  [x] Fair  [] Poor      Group Attendance on Unit:   [] Yes   [x] Selectively    [] No    Compliant with scheduled medications: [x] Yes  [] No    Received emergency medications in past 24 hrs: [] Yes   [x] No    Medication Side Effects: Denies         Mental Status Exam  Level of consciousness: Alert and awake   Appearance: Appropriate attire for setting, seated on bed, with fair  grooming and hygiene   Behavior/Motor: Approachable, engages with interviewer, no psychomotor abnormalities   Attitude toward examiner: Cooperative, attentive, good eye contact  Speech: Normal rate, volume, and tone  Mood: \"Okay\", depressed presentation  Affect: Flat  Thought processes: linear and coherent 
BEHAVIORAL HEALTH FOLLOW-UP NOTE     4/10/2025     Patient was seen and examined in person, Chart reviewed   Patient's case discussed with staff/team    Chief Complaint: Depression with suicidal ideation with plans to run into traffic or jump off a bridge     Interim History: Patient was lying down in her bed.  Patient has missed her meals.  Tells me that she is not hungry.  Pleasant and cooperative she has been taking her Effexor.  And her prazosin was not started we will monitor her symptoms if needed prazosin can be started at a later date.  Patient is vitals are within normal limits.  Patient's TSH is high and alkaline phosphorus is high.  Internal medicine on board.  Patient's blood counts are also high.  Patient remains in behavioral control has not needed any as needed medications.      /60   Pulse 60   Temp 96.8 °F (36 °C) (Temporal)   Resp 12   Ht 1.803 m (5' 11\")   Wt 93 kg (205 lb)   SpO2 97%   BMI 28.59 kg/m²   Appetite:   [x] Normal/Unchanged  [] Increased  [] Decreased      Sleep:       [] Normal/Unchanged  [x] Fair       [] Poor              Energy:    [x] Normal/Unchanged  [] Increased  [] Decreased        Aggression:  [] yes  [x] no    Patient is [x] able  [] unable to CONTRACT FOR SAFETY ON THE UNIT    PAST MEDICAL/PSYCHIATRIC HISTORY:   Past Medical History:   Diagnosis Date    Adrenal insufficiency     Depression     Diabetes mellitus type 2, diet-controlled (HCC)     Headache     Hypothyroidism     MEN (multiple endocrine neoplasia) (HCC)        FAMILY/SOCIAL HISTORY:  Family History   Problem Relation Age of Onset    Diabetes Mother     Cancer Mother         MEN syndrome    Stroke Brother         MEN syndrome    Depression Brother         Committed suicide    Hypertension Maternal Grandfather     Hypertension Paternal Grandfather      Social History     Socioeconomic History    Marital status: Single     Spouse name: Not on file    Number of children: Not on file    Years of 
CLINICAL PHARMACY NOTE: MEDS TO BEDS    Total # of Prescriptions Filled: 6   The following medications were delivered to the patient:  Venlafaxine ER 75mg  Clozapine 25mg  Hydrocortisone 5mg  Fludrocortisone 0.1mg  Omeprazole 20mg  Levothyroxine 150mcg    Additional Documentation: 4/23/25 12:57pm edwin delivered to ZAYRA Peres   
RT ASSESSMENT TREATMENT GOALS    [x]Pt Goal: Pt will identify 1-2 positive coping skills by time of discharge.    [x]Pt Goal: Pt will identify 1-2 positive aspects of self by time of discharge.    []Pt Goal: Pt will remain on task/topic for 15-30 minutes during group by time of discharge.    []Pt Goal: Pt will identify 1-2 aspects of relapse prevention plan by time of discharge.    []Pt Goal: Pt will join in conversation with peers 1-2 times per group by time of discharge.    []Pt Goal: Pt will identify 1-2 new leisure interests by time of discharge.    []Pt Goal: Pt will not voice any delusional content by time of discharge.    
MD Janice   levothyroxine (SYNTHROID) 150 MCG tablet Take 1 tablet by mouth Daily 4/23/25  Yes Janice Ochoa MD   hydrocortisone (CORTEF) 5 MG tablet Take 3 tablets by mouth daily 4/24/25  Yes Janice Ochoa MD   folic acid (FOLVITE) 1 MG tablet Take 1 tablet by mouth daily 3/28/25   David Avelar MD   ferrous sulfate (IRON 325) 325 (65 Fe) MG tablet Take 1 tablet by mouth in the morning and at bedtime 3/28/25   David Avelar MD   vitamin B-12 (CYANOCOBALAMIN) 1000 MCG tablet Take 1 tablet by mouth daily 3/28/25   David Avelar MD        Allergies:     Meperidine, Morphine, Acetaminophen, Buprenorphine hcl-naloxone hcl, Beef-derived drug products, Celecoxib, Desipramine, Gabapentin, Norpramin [desipramine hcl], Pcn [penicillins], Pregabalin, Sulfa antibiotics, and Methadone    Social History:     Tobacco:    reports that she has been smoking cigarettes. She has a 14 pack-year smoking history. She has never used smokeless tobacco.  Alcohol:      reports that she does not currently use alcohol.  Drug Use:  reports that she does not currently use drugs after having used the following drugs: Cocaine and Marijuana (Weed).    Family History:     Family History   Problem Relation Age of Onset    Diabetes Mother     Cancer Mother         MEN syndrome    Stroke Brother         MEN syndrome    Depression Brother         Committed suicide    Hypertension Maternal Grandfather     Hypertension Paternal Grandfather        Review of Systems:     Positive and Negative as described in HPI.    CONSTITUTIONAL:  negative for fevers, chills, sweats, fatigue, weight loss  HEENT:  negative for vision, hearing changes, runny nose, throat pain  RESPIRATORY:  negative for shortness of breath, cough, congestion, wheezing.  CARDIOVASCULAR:  negative for chest pain, palpitations.  GASTROINTESTINAL:  negative for nausea, vomiting, diarrhea, constipation, change in bowel habits, abdominal pain   GENITOURINARY:  negative for 
correlation is recommended, particularly when comparing to results   calculated using previous equations.  The CKD-EPI equation is less accurate in patients with extremes of muscle mass, extra-renal   metabolism of creatine, excessive creatine ingestion, or following therapy that affects   renal tubular secretion.      Calcium 04/08/2025 10.5 (H)  8.6 - 10.4 mg/dL Final    Total Protein 04/08/2025 8.9 (H)  6.6 - 8.7 g/dL Final    Albumin 04/08/2025 4.6  3.5 - 5.2 g/dL Final    Total Bilirubin 04/08/2025 0.3  0.0 - 1.2 mg/dL Final    Alkaline Phosphatase 04/08/2025 158 (H)  35 - 104 U/L Final    ALT 04/08/2025 19  10 - 35 U/L Final    AST 04/08/2025 20  10 - 35 U/L Final    Preg, Serum 04/08/2025 NEGATIVE  NEGATIVE Final    Comment: Specimens with hCG levels near the threshold of the test (25 mIU/mL) may give a negative or   indeterminate result.  In such cases, another test should be performed with a new specimen   in 48-72 hours.  If early pregnancy is suspected clinically in this setting, correlation   with quantitative serum b-hCG level is suggested.      Ethanol Lvl 04/08/2025 <10  <10 mg/dL Final    Ethanol percent 04/08/2025 Can not be calculated  <0.010 % Final    Color, UA 04/08/2025 Yellow  Yellow Final    Turbidity UA 04/08/2025 Clear  Clear Final    Glucose, Ur 04/08/2025 NEGATIVE  NEGATIVE mg/dL Final    Bilirubin, Urine 04/08/2025 NEGATIVE  NEGATIVE Final    Ketones, Urine 04/08/2025 NEGATIVE  NEGATIVE mg/dL Final    Specific Gravity, UA 04/08/2025 1.010  1.000 - 1.030 Final    Urine Hgb 04/08/2025 TRACE (A)  NEGATIVE Final    pH, Urine 04/08/2025 5.0  5.0 - 8.0 Final    Protein, UA 04/08/2025 NEGATIVE  NEGATIVE mg/dL Final    Urobilinogen, Urine 04/08/2025 Normal  0.0 - 1.0 EU/dL Final    Nitrite, Urine 04/08/2025 NEGATIVE  NEGATIVE Final    Leukocyte Esterase, Urine 04/08/2025 NEGATIVE  NEGATIVE Final    Amphetamine Screen, Ur 04/08/2025 NEGATIVE  NEGATIVE Final    Cutoff: 1000 ng/mL    Barbiturate 
factor using the 2021 CKD-EPI equation.  Careful clinical correlation is recommended, particularly when comparing to results   calculated using previous equations.  The CKD-EPI equation is less accurate in patients with extremes of muscle mass, extra-renal   metabolism of creatine, excessive creatine ingestion, or following therapy that affects   renal tubular secretion.      Calcium 04/08/2025 10.5 (H)  8.6 - 10.4 mg/dL Final    Total Protein 04/08/2025 8.9 (H)  6.6 - 8.7 g/dL Final    Albumin 04/08/2025 4.6  3.5 - 5.2 g/dL Final    Total Bilirubin 04/08/2025 0.3  0.0 - 1.2 mg/dL Final    Alkaline Phosphatase 04/08/2025 158 (H)  35 - 104 U/L Final    ALT 04/08/2025 19  10 - 35 U/L Final    AST 04/08/2025 20  10 - 35 U/L Final    Preg, Serum 04/08/2025 NEGATIVE  NEGATIVE Final    Comment: Specimens with hCG levels near the threshold of the test (25 mIU/mL) may give a negative or   indeterminate result.  In such cases, another test should be performed with a new specimen   in 48-72 hours.  If early pregnancy is suspected clinically in this setting, correlation   with quantitative serum b-hCG level is suggested.      Ethanol Lvl 04/08/2025 <10  <10 mg/dL Final    Ethanol percent 04/08/2025 Can not be calculated  <0.010 % Final    Color, UA 04/08/2025 Yellow  Yellow Final    Turbidity UA 04/08/2025 Clear  Clear Final    Glucose, Ur 04/08/2025 NEGATIVE  NEGATIVE mg/dL Final    Bilirubin, Urine 04/08/2025 NEGATIVE  NEGATIVE Final    Ketones, Urine 04/08/2025 NEGATIVE  NEGATIVE mg/dL Final    Specific Gravity, UA 04/08/2025 1.010  1.000 - 1.030 Final    Urine Hgb 04/08/2025 TRACE (A)  NEGATIVE Final    pH, Urine 04/08/2025 5.0  5.0 - 8.0 Final    Protein, UA 04/08/2025 NEGATIVE  NEGATIVE mg/dL Final    Urobilinogen, Urine 04/08/2025 Normal  0.0 - 1.0 EU/dL Final    Nitrite, Urine 04/08/2025 NEGATIVE  NEGATIVE Final    Leukocyte Esterase, Urine 04/08/2025 NEGATIVE  NEGATIVE Final    Amphetamine Screen, Ur 04/08/2025 
04/08/2025 NEGATIVE  NEGATIVE Final    Leukocyte Esterase, Urine 04/08/2025 NEGATIVE  NEGATIVE Final    Amphetamine Screen, Ur 04/08/2025 NEGATIVE  NEGATIVE Final    Cutoff: 1000 ng/mL    Barbiturate Screen, Ur 04/08/2025 NEGATIVE  NEGATIVE Final    Cutoff: 200 ng/ml    Benzodiazepine Screen, Urine 04/08/2025 NEGATIVE  NEGATIVE Final    Cutoff: 200 ng/ml    Cocaine Metabolite, Urine 04/08/2025 NEGATIVE  NEGATIVE Final    Cutoff: 300 ng/ml    Methadone Screen, Urine 04/08/2025 NEGATIVE  NEGATIVE Final    Cutoff: 300 ng/ml    Opiates, Urine 04/08/2025 NEGATIVE  NEGATIVE Final    Cutoff: 300 ng/ml    Phencyclidine, Urine 04/08/2025 NEGATIVE  NEGATIVE Final    Cutoff: 25 ng/ml    Cannabinoid Scrn, Ur 04/08/2025 NEGATIVE  NEGATIVE Final    Cutoff: 50 ng/ml    Oxycodone Screen, Ur 04/08/2025 NEGATIVE  NEGATIVE Final    Cutoff: 100 ng/ml    Fentanyl, Ur 04/08/2025 NEGATIVE  NEGATIVE Final    Cutoff: 5 ng/ml    Test Information 04/08/2025 This method is a screening test to detect only these drug classes as part of a medical workup. Confirmatory testing by another method should be ordered if clinically indicated.   Final    Lactic Acid 04/09/2025 1.1  0.5 - 2.2 mmol/L Final    Salicylate Lvl 04/09/2025 <1.0  0.0 - 10.0 mg/dL Final    Acetaminophen Level 04/09/2025 <5 (L)  10 - 30 ug/mL Final    Beta-Hydroxybutyrate 04/09/2025 0.13  0.02 - 0.27 mmol/L Final    TSH 04/09/2025 17.60 (H)  0.27 - 4.20 uIU/mL Final    POC Glucose 04/09/2025 95  65 - 105 mg/dL Final    T4 Free 04/09/2025 1.1  0.9 - 1.7 ng/dL Final    WBC, UA 04/08/2025 0 TO 2 (A)  0 TO 5 /HPF Final    RBC, UA 04/08/2025 0 TO 2  0 TO 2 /HPF Final    Casts UA 04/08/2025 0 TO 2 (A)  None /LPF Final    Epithelial Cells, UA 04/08/2025 0 TO 2  /HPF Final    Bacteria, UA 04/08/2025 None  None Final    WBC 04/11/2025 8.4  3.5 - 11.0 k/uL Final    RBC 04/11/2025 4.47  4.0 - 5.2 m/uL Final    Hemoglobin 04/11/2025 13.7  12.0 - 16.0 g/dL Final    Hematocrit 04/11/2025 40.6  
Amphetamine Screen, Ur 04/08/2025 NEGATIVE  NEGATIVE Final    Cutoff: 1000 ng/mL    Barbiturate Screen, Ur 04/08/2025 NEGATIVE  NEGATIVE Final    Cutoff: 200 ng/ml    Benzodiazepine Screen, Urine 04/08/2025 NEGATIVE  NEGATIVE Final    Cutoff: 200 ng/ml    Cocaine Metabolite, Urine 04/08/2025 NEGATIVE  NEGATIVE Final    Cutoff: 300 ng/ml    Methadone Screen, Urine 04/08/2025 NEGATIVE  NEGATIVE Final    Cutoff: 300 ng/ml    Opiates, Urine 04/08/2025 NEGATIVE  NEGATIVE Final    Cutoff: 300 ng/ml    Phencyclidine, Urine 04/08/2025 NEGATIVE  NEGATIVE Final    Cutoff: 25 ng/ml    Cannabinoid Scrn, Ur 04/08/2025 NEGATIVE  NEGATIVE Final    Cutoff: 50 ng/ml    Oxycodone Screen, Ur 04/08/2025 NEGATIVE  NEGATIVE Final    Cutoff: 100 ng/ml    Fentanyl, Ur 04/08/2025 NEGATIVE  NEGATIVE Final    Cutoff: 5 ng/ml    Test Information 04/08/2025 This method is a screening test to detect only these drug classes as part of a medical workup. Confirmatory testing by another method should be ordered if clinically indicated.   Final    Lactic Acid 04/09/2025 1.1  0.5 - 2.2 mmol/L Final    Salicylate Lvl 04/09/2025 <1.0  0.0 - 10.0 mg/dL Final    Acetaminophen Level 04/09/2025 <5 (L)  10 - 30 ug/mL Final    Beta-Hydroxybutyrate 04/09/2025 0.13  0.02 - 0.27 mmol/L Final    TSH 04/09/2025 17.60 (H)  0.27 - 4.20 uIU/mL Final    POC Glucose 04/09/2025 95  65 - 105 mg/dL Final    T4 Free 04/09/2025 1.1  0.9 - 1.7 ng/dL Final    WBC, UA 04/08/2025 0 TO 2 (A)  0 TO 5 /HPF Final    RBC, UA 04/08/2025 0 TO 2  0 TO 2 /HPF Final    Casts UA 04/08/2025 0 TO 2 (A)  None /LPF Final    Epithelial Cells, UA 04/08/2025 0 TO 2  /HPF Final    Bacteria, UA 04/08/2025 None  None Final    WBC 04/11/2025 8.4  3.5 - 11.0 k/uL Final    RBC 04/11/2025 4.47  4.0 - 5.2 m/uL Final    Hemoglobin 04/11/2025 13.7  12.0 - 16.0 g/dL Final    Hematocrit 04/11/2025 40.6  36 - 46 % Final    MCV 04/11/2025 90.8  80 - 100 fL Final    MCH 04/11/2025 30.7  26 - 34 pg Final    
Total Protein 04/11/2025 7.3  6.6 - 8.7 g/dL Final    Albumin 04/11/2025 4.0  3.5 - 5.2 g/dL Final    Total Bilirubin 04/11/2025 0.2  0.0 - 1.2 mg/dL Final    Alkaline Phosphatase 04/11/2025 110 (H)  35 - 104 U/L Final    ALT 04/11/2025 13  10 - 35 U/L Final    AST 04/11/2025 14  10 - 35 U/L Final    WBC 04/18/2025 9.5  3.5 - 11.0 k/uL Final    RBC 04/18/2025 4.24  4.0 - 5.2 m/uL Final    Hemoglobin 04/18/2025 12.9  12.0 - 16.0 g/dL Final    Hematocrit 04/18/2025 37.9  36 - 46 % Final    MCV 04/18/2025 89.4  80 - 100 fL Final    MCH 04/18/2025 30.4  26 - 34 pg Final    MCHC 04/18/2025 34.0  31 - 37 g/dL Final    RDW 04/18/2025 14.6  11.5 - 14.9 % Final    Platelets 04/18/2025 326  150 - 450 k/uL Final    MPV 04/18/2025 7.0  6.0 - 12.0 fL Final    Neutrophils % 04/18/2025 57  36 - 66 % Final    Lymphocytes % 04/18/2025 34  24 - 44 % Final    Monocytes % 04/18/2025 7  1 - 7 % Final    Eosinophils % 04/18/2025 2  0 - 4 % Final    Basophils % 04/18/2025 0  0 - 2 % Final    Neutrophils Absolute 04/18/2025 5.41  1.3 - 9.1 k/uL Final    Lymphocytes Absolute 04/18/2025 3.23  1.0 - 4.8 k/uL Final    Monocytes Absolute 04/18/2025 0.67  0.1 - 1.3 k/uL Final    Eosinophils Absolute 04/18/2025 0.19  0.0 - 0.4 k/uL Final    Basophils Absolute 04/18/2025 0.00  0.0 - 0.2 k/uL Final    Morphology 04/18/2025 Normal   Final         Reviewed patient's current plan of care and vital signs with nursing staff.    Labs reviewed: [x] Yes  Vitals reviewed: [x] Yes    Medications  Current Facility-Administered Medications: cloZAPine (CLOZARIL) tablet 12.5 mg, 12.5 mg, Oral, BID  hydrocortisone (CORTEF) tablet 15 mg, 15 mg, Oral, Daily  venlafaxine (EFFEXOR XR) extended release capsule 225 mg, 225 mg, Oral, Daily with breakfast  traZODone (DESYREL) tablet 100 mg, 100 mg, Oral, Nightly  folic acid (FOLVITE) tablet 1 mg, 1 mg, Oral, Daily  pantoprazole (PROTONIX) tablet 40 mg, 40 mg, Oral, QAM AC  vitamin B-12 (CYANOCOBALAMIN) tablet 1,000 mcg, 
less accurate in patients with extremes of muscle mass, extra-renal   metabolism of creatine, excessive creatine ingestion, or following therapy that affects   renal tubular secretion.      Calcium 04/11/2025 10.1  8.6 - 10.4 mg/dL Final    Total Protein 04/11/2025 7.3  6.6 - 8.7 g/dL Final    Albumin 04/11/2025 4.0  3.5 - 5.2 g/dL Final    Total Bilirubin 04/11/2025 0.2  0.0 - 1.2 mg/dL Final    Alkaline Phosphatase 04/11/2025 110 (H)  35 - 104 U/L Final    ALT 04/11/2025 13  10 - 35 U/L Final    AST 04/11/2025 14  10 - 35 U/L Final         Reviewed patient's current plan of care and vital signs with nursing staff.    Labs reviewed: [x] Yes  Vitals reviewed: [x] Yes      Medications  Current Facility-Administered Medications: venlafaxine (EFFEXOR XR) extended release capsule 187.5 mg, 187.5 mg, Oral, Daily with breakfast  polyethylene glycol (GLYCOLAX) packet 17 g, 17 g, Oral, Daily PRN  hydrOXYzine HCl (ATARAX) tablet 50 mg, 50 mg, Oral, TID PRN  haloperidol (HALDOL) tablet 5 mg, 5 mg, Oral, Q4H PRN **OR** haloperidol lactate (HALDOL) injection 5 mg, 5 mg, IntraMUSCular, Q4H PRN  diphenhydrAMINE (BENADRYL) injection 50 mg, 50 mg, IntraMUSCular, Q4H PRN  aluminum & magnesium hydroxide-simethicone (MAALOX PLUS) 200-200-20 MG/5ML suspension 30 mL, 30 mL, Oral, Q6H PRN  nicotine polacrilex (COMMIT) lozenge 2 mg, 2 mg, Oral, Q1H PRN  fludrocortisone (FLORINEF) tablet 0.1 mg, 0.1 mg, Oral, Daily  hydrocortisone (CORTEF) tablet 15 mg, 15 mg, Oral, QPM  levothyroxine (SYNTHROID) tablet 150 mcg, 150 mcg, Oral, Daily  melatonin tablet 6 mg, 6 mg, Oral, Nightly PRN  ibuprofen (ADVIL;MOTRIN) tablet 400 mg, 400 mg, Oral, Q6H PRN    ASSESSMENT  Major depressive disorder, recurrent, severe without psychotic behavior (HCC)           PATIENT HANDOFF:  Patient's symptoms are unstable.  Unable to contract for safety in the community  Attempt to develop insight  Psycho-education conducted.  Supportive Therapy conducted.  Medication 
HANDOFF  Patient symptoms are: Showing modest improvement, remains unstable for discharge.  Monitor need and frequency of PRN medications.  Encourage participation in groups and milieu.  Medication changes and discharge planning per attending  Follow-up daily while inpatient.     Patient continues to be monitored in the inpatient psychiatric facility at [facility] for safety and stabilization. Patient continues to need, on a daily basis, active treatment furnished directly by or requiring the supervision of inpatient psychiatric personnel.    Electronically signed by EDGAR Singleton CNP on 4/17/2025 at 5:20 PM    **This report has been created using voice recognition software. It may contain minor errors which are inherent in voice recognition technology.**   I independently saw and evaluated the patient.  I reviewed the nurse practitioners documentation above.  Principle diagnosis we are treating for is Major depressive disorder, recurrent severe without psychotic features (HCC). Any additional comments or changes to the nurse practitioners documentation are stated below otherwise agree with assessment.  Plan will be as follows:  Patient continues to endorse suicidal ideations and depression.  Patient is also isolative trying and attempting to take part in groups where patient reports this is extremely difficult for her given the positive trauma history.  Patient is tolerating higher doses of Effexor we will continue the current dose.  Patient is agreeable to add atypical antipsychotics to augment her depression.  Patient has significant TD hands instead of Abilify it was decided to try Clozaril 25 mg.  Patient will need monitoring for side effects while on Clozaril.  PLAN  Patient s symptoms   some improvement  Attempt to develop insight  Psycho-education conducted.  Supportive Therapy conducted.  Probable discharge is 2-3 days  Follow-up daily while on inpatient unit  In addition the E&M, the approximately 17

## 2025-04-23 NOTE — BH NOTE
[facility] [place]  Discharge Note    Pt discharged with followings belongings:   Dental Appliances: None  Vision - Corrective Lenses: Eyeglasses  Hearing Aid: None  Jewelry: None  Body Piercings Removed: No  Clothing: Footwear, Pants, Shirt  Other Valuables: Credit/Debit Card, Money, Wallet, Keys   Valuables sent home with patient. Patient educated on aftercare instructions: Yes  Information faxed to [facility] by nurse  at 2:51 PM .Patient verbalize understanding of AVS:  Yes.    Status EXAM upon discharge:  Mental Status and Behavioral Exam  Normal: No  Level of Assistance: Independent/Self  Facial Expression: Flat  Affect: Blunt  Level of Consciousness: Alert  Frequency of Checks: 4 times per hour, close  Mood:Normal: No  Mood: Depressed  Motor Activity:Normal: No  Motor Activity: Decreased  Eye Contact: Fair  Observed Behavior: Cooperative  Sexual Misconduct History: Current - no  Preception: [place] to person, [place] to time, [place] to place  Attention:Normal: No  Attention: Distractible  Thought Processes: Unremarkable  Thought Content:Normal: No  Thought Content: Preoccupations  Depression Symptoms: Impaired concentration  Anxiety Symptoms: Generalized  Marni Symptoms: No problems reported or observed.  Hallucinations: None  Delusions: No  Memory:Normal: Yes  Insight and Judgment: No  Insight and Judgment: Poor judgment, Poor insight    Tobacco Screening:  Practical Counseling, on admission, ko X, if applicable and completed (first 3 are required if patient doesn't refuse):            ( ) Recognizing danger situations (included triggers and roadblocks)                    ( ) Coping skills (new ways to manage stress,relaxation techniques, changing routine, distraction)                                                           ( ) Basic information about quitting (benefits of quitting, techniques in how to quit, available resources  ( ) [facility]

## 2025-04-23 NOTE — PLAN OF CARE
Problem: Anxiety  Goal: Will report anxiety at manageable levels  Description: INTERVENTIONS:  1. Administer medication as ordered  2. Teach and rehearse alternative coping skills  3. Provide emotional support with 1:1 interaction with staff  4/22/2025 2118 by Gordo Cali RN  Outcome: Progressing     Problem: Pain  Goal: Verbalizes/displays adequate comfort level or baseline comfort level  4/22/2025 2118 by Gordo Cali RN  Outcome: Progressing     Problem: Self Harm/Suicidality  Goal: Will have no self-injury during hospital stay  Description: INTERVENTIONS:  1.  Ensure constant observer at bedside with Q15M safety checks  2.  Maintain a safe environment  3.  Secure patient belongings  4.  Ensure family/visitors adhere to safety recommendations  5.  Ensure safety tray has been added to patient's diet order  6.  Every shift and PRN: Re-assess suicidal risk via Frequent Screener    4/22/2025 2118 by Gordo Cali RN  Outcome: Progressing  Note: Patient denies thoughts to harm self and others. Patient was cooperative with assessment, she reports her mood is \"okay\". Patient reports improvement in anxiety and rates it a 4/10 with 10 being the worst. Patient is able to verbalize pain appropriately. Patient spent most the evening reading isolative to self. Safe environment maintained, will continue to offer support.

## 2025-04-23 NOTE — DISCHARGE INSTRUCTIONS
Information:  Medications:   Medication summary provided   I understand that I should take only the medications on my list.     -why and when I need to take each medicine.     -which side effects to watch for.     -that I should carry my medication information at all times in case of     Emergency situations.    I will take all of my medicines to follow up appointments.     -check with my physician or pharmacist before taking any new    Medication, over the counter product or drink alcohol.    -Ask about food, drug or dietary supplement interactions.    -discard old lists and update records with medication providers.    Notify Physician:  Notify physician if you notice:   Always call 911 if you feel your life is in danger  In case of an emergency call 911 immediately!  If 911 is not available call your local emergency medical system for help    Behavioral Health Follow Up:  Original Referral Source:PPU  Discharge Diagnosis: Suicidal ideation [R45.851]  Depression with suicidal ideation [F32.A, R45.851]  Recommendations for Level of Care: take medications as ordered, keep follow up appt  Patient status at discharge: alert/oriented  [facility]  [facility]  Aftercare plan faxed: kaitlin   -faxed by: staff   -date: 4/23/25   -time: 11:53a  Prescriptions: filled ans sent with patient     Smoking: Quit Smoking.   Call the [facility]'s smoking quitline at 6-965-51L-QUIT  Know the signs of a heart attack   If you have any of the following symptoms call 911 immediately, do not wait more    Than five minutes.    1. Pressure, fullness and/ or squeezing in the center of the chest spreading to    The jaw, neck or shoulder.    2. Chest discomfort with light headedness, fainting, sweating, nausea or    Shortness of breath.   3. Upper abdominal pressure or discomfort.   4. Lower chest pain, back pain, unusual fatigue, shortness of breath, nausea   Or dizziness.     General Information:   Questions regarding your bill: Call

## 2025-04-23 NOTE — GROUP NOTE
Group Therapy Note    Date: 4/10/2025    Group Start Time: 0900  Group End Time: 0945  Group Topic: Group Documentation    [facility]    Mark Powers    Goal-Setting Group Note        Date: 4/10/2025  Start Time: 0900  End Time: 0945      Number of Participants in Group & Unit Census:  3/21    Topic: goal planning    Goal of Group: patients have opportunity to discuss barriers that they have experienced to reaching their goals      Comments:     Patient did not participate in Goal-setting group, despite staff encouragement and explanation of benefits.  Patient remain seclusive to self.  Q15 minute safety checks maintained for patient safety and will continue to encourage patient to attend unit programming.     Modes of Intervention: Education and Support      Discipline Responsible: Behavorial Health Tech      Signature:  Mark Powers    
                                                                      Group Therapy Note    Date: 4/10/2025    Group Start Time: 1000  Group End Time: 1030  Group Topic: Psychoeducation    [facility]    [facility]        Group Therapy Note    Attendees: 4/22     Patient was offered group therapy today but declined to participate despite encouragement from staff. 1:1 was offered.    Signature:  Kaden Ren    
                                                                      Group Therapy Note    Date: 4/10/2025    Group Start Time: 1100  Group End Time: 1145  Group Topic: Cognitive Skills    [facility]    Rosemary Dahl CTRS        Group Therapy Note    Attendees: 6/21     Topic: To increase socialization, practice problem solving, deductive reasoning, discussion  and communication skills.       Comments:   Patient did not participate in Cognitive Skills Group, at 11:00, despite staff encouragement and explanation   of benefits. Pt was seclusive to self et room.  Q15 minute safety checks maintained for patient safety and   will continue to encourage patient to attend unit programming.       Discipline Responsible: Psychoeducational Specialist   Signature: LEV BENITEZ      
                                                                      Group Therapy Note    Date: 4/10/2025    Group Start Time: 1430  Group End Time: 1535  Group Topic: Cognitive Skills    [facility]    Rosemary Dahl CTRS        Group Therapy Note    Attendees: 9/20     Topic: To increase socialization, engage in creative expression and discussion r/t positive coping skills.      Comments:   Patient did not participate in Cognitive Skills Group, at 14:30, despite staff encouragement and   explanation of benefits. Pt was seclusive to self et room.  Q15 minute safety checks maintained   for patient safety and will continue to encourage patient to attend unit programming.       Discipline Responsible: Psychoeducational Specialist   Signature: LEV BENITEZ      
                                                                      Group Therapy Note    Date: 4/10/2025    Group Start Time: 2015  Group End Time: 2045  Group Topic: Wrap-Up    [facility]    Emelina Zabala, RN      Group Therapy Note    Attendees: 9/19         Pt refused to attend Wrap-up/Goal Review group this evening after encouragement from staff.  1:1 talk time offered as alternative to group session, but pt declined.  Will continue to encourage patient to attend unit group programming.        Signature:  Emelina Zabala, RN       
                                                                      Group Therapy Note    Date: 4/11/2025    Group Start Time: 1015  Group End Time: 1045  Group Topic: Psychoeducation    [facility]    Kaden Ren        Group Therapy Note    Attendees: 2/20     Patient was offered group therapy today but declined to participate despite encouragement from staff. 1:1 was offered.  Signature:  Kaden Ren    
                                                                      Group Therapy Note    Date: 4/11/2025    Group Start Time: 2000  Group End Time: 2130  Group Topic: Community Meeting    [facility]    Martina John LPN        Group Therapy Note    Attendees: 10/17       Patient's Goal:  Relaxation    Status After Intervention:  Improved    Participation Level: Active Listener and Interactive    Participation Quality: Appropriate      Speech:  normal      Thought Process/Content: Logical      Affective Functioning: Congruent      Mood: anxious      Level of consciousness:  Alert, Oriented x4, and Attentive      Response to Learning: Able to verbalize current knowledge/experience, Able to retain information, and Progressing to goal      Endings: None Reported    Modes of Intervention: Education, Support, Socialization, Problem-solving, Activity, and Movement      Discipline Responsible: Licensed Practical Nurse      Signature:  Martina John LPN    
                                                                      Group Therapy Note    Date: 4/12/2025    Group Start Time: 0930  Group End Time: 0945  Group Topic: Group Documentation    [facility]    Mark Powers  Goal-Setting Group Note        Date: 4/12/2025  Start Time: 0930  End Time: 0945      Number of Participants in Group & Unit Census:  3/18    Topic: goal-setting    Goal of Group:patients have opportunity to discuss how to successfully reach their goals      Comments:     Patient did not participate in Goal-Setting group, despite staff encouragement and explanation of benefits.  Patient remain seclusive to self.  Q15 minute safety checks maintained for patient safety and will continue to encourage patient to attend unit programming.     Modes of Intervention: Education      Discipline Responsible: Behavorial Health Tech      Signature:  Mark Powers    
                                                                      Group Therapy Note    Date: 4/12/2025    Group Start Time: 1415  Group End Time: 1520  Group Topic: Cognitive Skills    [facility]    Rosemary Dahl CTRS        Group Therapy Note    Attendees: 13/18     Topic: To increase socialization, engage in discussion r/t leisure interests, relating to peers   about shared/new interests ,practice concentration with art task and music .       Comments:   Patient did not participate in Cognitive Skills Group, at 14:15, despite staff encouragement and   explanation of benefits. Pt was seclusive to self et room.  Q15 minute safety checks maintained   for patient safety and will continue to encourage patient to attend unit programming.       Discipline Responsible: Psychoeducational Specialist   Signature: LEV BENITEZ      
                                                                      Group Therapy Note    Date: 4/13/2025    Group Start Time: 0930  Group End Time: 1000  Group Topic: Group Documentation    [facility]    Mark Powers    Goal-Setting Group Note        Date: 4/13/2025  Start Time: 0930  End Time: 1000      Number of Participants in Group & Unit Census:  8/20    Topic: setting up for success when striving for long term goals    Goal of Group:goal-setting      Comments:     Patient did not participate in Goal-Setting group, despite staff encouragement and explanation of benefits.  Patient remain seclusive to self.  Q15 minute safety checks maintained for patient safety and will continue to encourage patient to attend unit programming.       Modes of Intervention: Education, Support, and Socialization      Discipline Responsible: Behavorial Health Tech      Signature:  Mark Powers    
                                                                      Group Therapy Note    Date: 4/13/2025    Group Start Time: 1430  Group End Time: 1445  Group Topic: Healthy Living/Wellness    [facility]    Mark Powers    Health/Wellness Group Note        Date: 4/13/2025  Start Time: 1430  End Time: 1445      Number of Participants in Group & Unit Census:  15/19    Topic: coping skills     Goal of Group: patients discuss what coping skills they can use when things are getting bad      Comments:     Patient did not participate in Health/Wellness group, despite staff encouragement and explanation of benefits.  Patient remain seclusive to self.  Q15 minute safety checks maintained for patient safety and will continue to encourage patient to attend unit programming.       Modes of Intervention: Education and Activity      Discipline Responsible: Behavorial Health Tech      Signature:  Mark Powers    
                                                                      Group Therapy Note    Date: 4/13/2025    Group Start Time: 1700  Group End Time: 1730  Group Topic: Group Documentation    Mark Diaz      Sunday Safety Checks    Patient participated in Sunday Safety Checks. No issues to document.   Q15 minute safety checks maintained.      Signature:  Mark Powers    
                                                                      Group Therapy Note    Date: 4/14/2025    Group Start Time: 1000  Group End Time: 1030  Group Topic: Psychoeducation    [facility]        Group Therapy Note    Attendees: 9/20       Patient was offered group therapy today but declined to participate despite encouragement from staff. 1:1 was offered.      Signature:  Kaden Ren    
                                                                      Group Therapy Note    Date: 4/14/2025    Group Start Time: 1100  Group End Time: 1159  Group Topic: Group Therapy    [facility]    Michelle James LPN        Group Therapy Note    Attendees: 10/20    patient refused to attend group at 1100 after encouragement from staff.  1:1 talk time provided as alternative to group session      Signature:  Michelle James LPN    
                                                                      Group Therapy Note    Date: 4/14/2025    Group Start Time: 1430  Group End Time: 1520  Group Topic: Focus Group    [facility]    Dayami Garcia RN        Group Therapy Note    Attendees: 13/18           Notes:  Patient declined to participate in group at this time. Patient was offered 1:1 time with writer and declined that as well.       Signature:  DAYAMI GARCIA RN  
                                                                      Group Therapy Note    Date: 4/15/2025    Group Start Time: 0900  Group End Time: 0915  Group Topic: Community Meeting    [facility]    Rashida Arango    Community Meeting Group Note        Date: 4/15/2025 Start Time: 9am  End Time: 0915      Number of Participants in Group & Unit Census:  6/22        Goal of Group: To discuss daily goals      Comments:     Patient did not participate in Community Meeting group, despite staff encouragement and explanation of benefits.  Patient remain seclusive to self.  Q15 minute safety checks maintained for patient safety and will continue to encourage patient to attend unit programming.            Signature:  Rashida Arango    
                                                                      Group Therapy Note    Date: 4/15/2025    Group Start Time: 1000  Group End Time: 1030  Group Topic: Psychoeducation    [facility]    Kaden Ren        Group Therapy Note    Attendees: 8/22     Patient was offered group therapy today but declined to participate despite encouragement from staff. 1:1 was offered.  Signature:  Kaden Ren    
                                                                      Group Therapy Note    Date: 4/15/2025    Group Start Time: 2000  Group End Time: 2030  Group Topic: Wrap-Up    [facility]    Hilary Menard, RN        Group Therapy Note    Attendees: 4/18     Patient refused to attend group at this time.   
                                                                      Group Therapy Note    Date: 4/16/2025    Group Start Time: 0900  Group End Time: 0920  Group Topic: Discharge Planning    [facility]    Rashida Arango      Discharge Process  Group Note        Date: 4/16/2025 Start Time: 9am  End Time: 0920      Number of Participants in Group & Unit Census:  2/20        Goal of Group: To go over discharge planning and how it works       Comments:     Patient did not participate in  Discharge Process  group, despite staff encouragement and explanation of benefits.  Patient remain seclusive to self.  Q15 minute safety checks maintained for patient safety and will continue to encourage patient to attend unit programming.            Signature:  Rashida Arango    
                                                                      Group Therapy Note    Date: 4/16/2025    Group Start Time: 1000  Group End Time: 1030  Group Topic: Psychoeducation    [facility]    K[facility]        Group Therapy Note    Attendees: 5/19     Patient was offered group therapy today but declined to participate despite encouragement from staff. 1:1 was offered.    Signature:  Kaden Ren    
                                                                      Group Therapy Note    Date: 4/16/2025    Group Start Time: 1100  Group End Time: 1155  Group Topic: Cognitive Skills    [facility]    Rosemary Dahl CTRS        Group Therapy Note    Attendees: 8/20     Topic: To increase socialization, explore perception, practice decision making   and communication skills.        Comments:   Patient did not participate in Cognitive Skills Group, at 11:00, despite staff encouragement and   explanation of benefits. Pt was seclusive to self et room.  Q15 minute safety checks maintained   for patient safety and will continue to encourage patient to attend unit programming.       Discipline Responsible: Psychoeducational Specialist   Signature: LEV BENITEZ   
                                                                      Group Therapy Note    Date: 4/16/2025    Group Start Time: 1330  Group End Time: 1400  Group Topic: Healthy Living/Wellness    [facility]    Rashida Arango    Cognitive Skills Group Note        Date: 4/16/2025 Start Time: 1:30pm  End Time: 1400      Number of Participants in Group & Unit Census:  6/19        Goal of Group: Participate in coping skills groups       Comments:     Patient did not participate in Cognitive Skills group, despite staff encouragement and explanation of benefits.  Patient remain seclusive to self.  Q15 minute safety checks maintained for patient safety and will continue to encourage patient to attend unit programming.            Signature:  Rashida Arango    
                                                                      Group Therapy Note    Date: 4/16/2025    Group Start Time: 1440  Group End Time: 1530  Group Topic: Cognitive Skills    [facility]    Rosemary Dahl CTRS        Group Therapy Note    Attendees: 5/19     Topic: To increase socialization, practice self expression, exploring feelings, barriers, and   positive coping skills/resources to maintain mental health at home/in community using creative   expression and discussion.        Comments:   Patient did not participate in Cognitive Skills Group, at 14:40, despite staff encouragement and   explanation of benefits. Pt was seclusive to self et room.  Q15 minute safety checks maintained   for patient safety and will continue to encourage patient to attend unit programming.       Discipline Responsible: Psychoeducational Specialist   Signature: LEV BENITEZ      
                                                                      Group Therapy Note    Date: 4/17/2025    Group Start Time: 0800  Group End Time: 0815  Group Topic: Orientation Group    [facility]    Hiral Andersen        Group Therapy Note      Attendees: 17/20         Patient's Goal:  Review of staff, rules and schedule    Notes:      Status After Intervention:  Unchanged    Participation Level: Active Listener    Participation Quality: Appropriate      Speech:  normal      Thought Process/Content: Logical      Affective Functioning: Congruent      Mood: anxious      Level of consciousness:  Alert      Response to Learning: Able to verbalize current knowledge/experience and Progressing to goal      Endings: None Reported    Modes of Intervention: Education, Support, and Socialization      Discipline Responsible: Behavorial Health Tech      Signature:  Hiral Andersen    
                                                                      Group Therapy Note    Date: 4/17/2025    Group Start Time: 0900  Group End Time: 0930  Group Topic: Community Meeting    [facility]    Hiral Andersen        Group Therapy Note    Attendees: 6/20       Patient's Goal:  Go to all the groups    Notes:      Status After Intervention:  Unchanged    Participation Level: Active Listener    Participation Quality: Appropriate      Speech:  normal      Thought Process/Content: Logical      Affective Functioning: Congruent      Mood: anxious      Level of consciousness:  Alert      Response to Learning: Able to verbalize current knowledge/experience and Progressing to goal      Endings: None Reported    Modes of Intervention: Education, Support, and Socialization      Discipline Responsible: Behavorial Health Tech      Signature:  Hiral Andersen    
                                                                      Group Therapy Note    Date: 4/17/2025    Group Start Time: 1000  Group End Time: 1030  Group Topic: Psychoeducation    [facility]    Kaden Ren        Group Therapy Note    Attendees: 7/20       Patient's Goal:    PT will demonstrate increased interpersonal interaction and participate in group activities of discussing positive coping skills.    Notes:  Patient is making progress, AEB participating in group discussion, actively listening, and supporting other group members.     Status After Intervention:  Unchanged    Participation Level: Active Listener and Interactive    Participation Quality: Appropriate, Attentive, and Sharing      Speech:  normal      Thought Process/Content: Logical      Affective Functioning: Flat      Mood: euthymic      Level of consciousness:  Alert, Oriented x4, and Attentive      Response to Learning: Able to verbalize/acknowledge new learning and Progressing to goal      Endings: None Reported    Modes of Intervention: Education, Support, and Socialization      Discipline Responsible: /Counselor      Signature:  Kaden Ren    
                                                                      Group Therapy Note    Date: 4/17/2025    Group Start Time: 1100  Group End Time: 1145  Group Topic: Cognitive Skills    [facility]    Rosemary Dahl CTRS        Group Therapy Note    Attendees: 10/21     Topic: To increase socialization, practice decision making   and communication skills.        Comments:   Patient did not participate in Cognitive Skills Group, at 11:00, despite staff encouragement and   explanation of benefits. Pt was seclusive to self et room.  Q15 minute safety checks maintained   for patient safety and will continue to encourage patient to attend unit programming.       Discipline Responsible: Psychoeducational Specialist   Signature: LVE BENITEZ   
                                                                      Group Therapy Note    Date: 4/17/2025    Group Start Time: 1430  Group End Time: 1530  Group Topic: Activity    [facility]    Rosemary Dahl CTRS        Group Therapy Note    Attendees: 12/16     Topic: To increase socialization, practice creative expression and explore music choices as a form of   stress management, engage in discussion.      Comments:   Patient did not participate in Cognitive Skills Group, at 14:30, despite staff encouragement and   explanation of benefits. Pt was seclusive to self et room.  Q15 minute safety checks maintained   for patient safety and will continue to encourage patient to attend unit programming.       Discipline Responsible: Psychoeducational Specialist   Signature: LEV BENITEZ   
                                                                      Group Therapy Note    Date: 4/18/2025    Group Start Time: 0900  Group End Time: 0935  Group Topic: Community Meeting    [facility]    Hiral Andersen        Group Therapy Note    Attendees: 7/15       Patient's Goal:  Stay positive    Notes:      Status After Intervention:  Unchanged    Participation Level: Active Listener    Participation Quality: Appropriate and Attentive      Speech:  normal      Thought Process/Content: Logical      Affective Functioning: Congruent      Mood: anxious      Level of consciousness:  Alert and Attentive      Response to Learning: Able to verbalize current knowledge/experience and Progressing to goal      Endings: None Reported    Modes of Intervention: Education, Support, and Socialization      Discipline Responsible: Behavorial Health Tech      Signature:  Hiral Andersen    
                                                                      Group Therapy Note    Date: 4/18/2025    Group Start Time: 1015  Group End Time: 1045  Group Topic: Cognitive Skills    [facility]    Hiral Andersen        Group Therapy Note    Attendees: 9/15       Patient's Goal:  Choose a positive affirmation that speaks to you    Notes:      Status After Intervention:  Unchanged    Participation Level: Active Listener    Participation Quality: Appropriate      Speech:  normal      Thought Process/Content: Logical      Affective Functioning: Congruent      Mood: anxious      Level of consciousness:  Alert      Response to Learning: Able to verbalize current knowledge/experience and Progressing to goal      Endings: None Reported    Modes of Intervention: Education, Support, and Socialization      Discipline Responsible: Behavorial Health Tech      Signature:  Hiral Andersen    
                                                                      Group Therapy Note    Date: 4/18/2025    Group Start Time: 1100  Group End Time: 1155  Group Topic: Cognitive Skills    [facility]    Rosemary Dahl CTRS        Group Therapy Note    Attendees: 9/15       Topic: To increase socialization, practice decision making , concentration,  and communication skills.        Comments:   Patient did not participate in Cognitive Skills Group, at 11:00, despite staff encouragement and   explanation of benefits. Pt was seclusive to self et room.  Q15 minute safety checks maintained   for patient safety and will continue to encourage patient to attend unit programming.       Discipline Responsible: Psychoeducational Specialist   Signature: LEV BENITEZ      
                                                                      Group Therapy Note    Date: 4/18/2025    Group Start Time: 1430  Group End Time: 1530  Group Topic: Cognitive Skills    [facility]    Rosemary Dahl CTRS        Group Therapy Note    Attendees: 7/13     Patient's Goal: To increase socialization, practice making relevant choices r/t a specific topic focus,  and communication skills.         Notes: Pt was pleasant and attentive to group but did not participate in Group task. Pt was withdrawn but did share with RT 1:1..      Status After Intervention:  Improved     Participation Level:  Attentive, sharing with RT, seclusive to self     Participation Quality: Attentive, sharing with RT , seclusive to self      Speech:  Normal     Thought Process/Content: Logical , linear      Affective Functioning: Blunted, brightened briefly      Mood: Pleasant on approach, seclusive to self around peers. Pt states she is still struggling with her depression and states has not yet felt improvement in mood but is trying to feel /get better by starting to attend groups and continue taking meds. RT offered support and encouraged pt to keep attending groups to help with process of decreasing feelings of isolation and depression. Pt was appreciative of support.      Level of consciousness:  Attentive and Alert      Response to Learning: Able to verbalize current knowledge/experience, Able to verbalize/acknowledge    new learning  and Progressing to goal      Endings: None Reported     Modes of Intervention: Education, Support, Exploration, Clarifying, and Problem solving      Discipline Responsible: Psychoeducational Specialist  Signature:  LEV BENITEZ  
                                                                      Group Therapy Note    Date: 4/19/2025    Group Start Time: 1030  Group End Time: 1100  Group Topic: Psychoeducation    [facility]    Kaden Ren        Group Therapy Note    Attendees: 5/16       Patient's Goal:  PT will participate actively in group discussion using conversation cubes.     Notes:  Patient is making progress, AEB participating in group discussion, actively listening, and supporting other group members.      Status After Intervention:  Unchanged    Participation Level: Active Listener and Interactive    Participation Quality: Appropriate, Attentive, and Sharing      Speech:  normal      Thought Process/Content: Logical      Affective Functioning: Flat      Mood: depressed      Level of consciousness:  Alert, Oriented x4, and Attentive      Response to Learning: Able to verbalize/acknowledge new learning and Progressing to goal      Endings: None Reported    Modes of Intervention: Education, Support, and Socialization      Discipline Responsible: /Counselor      Signature:  Kaden Ren    
                                                                      Group Therapy Note    Date: 4/19/2025    Group Start Time: 1330  Group End Time: 1410  Group Topic: Cognitive Skills    [facility]    Anika Sommers CTRS    Cognitive Skills Group Note        Date: April 19, 2025 Start Time: 1:30pm  End Time:  210pm      Number of Participants in Group & Unit Census:  2/14    Topic: cognitive skills     Goal of Group: pt will demonstrate improved coping skills and improved interpersonal relationships      Comments:     Patient did not participate in Cognitive Skills group, despite staff encouragement and explanation of benefits.  Patient remain seclusive to self.  Q15 minute safety checks maintained for patient safety and will continue to encourage patient to attend unit programming.              Signature:  LEV OSPINA    
                                                                      Group Therapy Note    Date: 4/19/2025    Group Start Time: 2030  Group End Time: 2100  Group Topic: Relaxation    [facility]    Emelina Zabala, RN      Group Therapy Note:       Pt refused to attend Relaxation group this evening after encouragement from staff.  1:1 talk time offered as alternative to group session but pt declined. Will continue to encourage patient to attend unit group programming.        Signature:  Emelina Zabala RN       
                                                                      Group Therapy Note    Date: 4/20/2025    Group Start Time: 1030  Group End Time: 1100  Group Topic: Psychoeducation    [facility]    Swetha Wu MSW        Group Therapy Note    Attendees: 4/15       Patient was offered group therapy today but declined to participate despite encouragement from staff.  1:1 was offered.       Signature:  SULEMA Rutledge    
                                                                      Group Therapy Note    Date: 4/20/2025    Group Start Time: 1405  Group End Time: 1500  Group Topic: Recreational    [facility]    Rosemary Dahl CTRS        Group Therapy Note    Attendees: 7/15     Topic: To increase socialization, practice leisure skills, cooperation with peers,  and communication skills.        Comments:   Patient did not participate in Cognitive Skills Group, at 14:05, despite staff encouragement and   explanation of benefits. Pt was seclusive to self et room.  Q15 minute safety checks maintained   for patient safety and will continue to encourage patient to attend unit programming.       Discipline Responsible: Psychoeducational Specialist   Signature: LEV BENITEZ   
                                                                      Group Therapy Note    Date: 4/20/2025    Group Start Time: 2000  Group End Time: 2030  Group Topic: Wrap-Up    [facility]    Martina John LPN        Group Therapy Note    Attendees: 11/20       Patient's Goal:  Socialize more and work on steps to discharge.    Status After Intervention:  Improved    Participation Level: Active Listener and Interactive    Participation Quality: Appropriate, Attentive, Sharing, and Supportive      Speech:  normal      Thought Process/Content: Flight of ideas      Affective Functioning: Blunted and Exaggerated      Mood: anxious and elevated      Level of consciousness:  Alert, Oriented x4, and Attentive      Response to Learning: Able to verbalize current knowledge/experience, Capable of insight, and Progressing to goal      Endings: None Reported    Modes of Intervention: Education, Support, Socialization, Problem-solving, and Activity      Discipline Responsible: Licensed Practical Nurse      Signature:  Martina John LPN    
                                                                      Group Therapy Note    Date: 4/21/2025    Group Start Time: 0900  Group End Time: 0936  Group Topic: Group Documentation    [facility]    Larisa Vera LPN        Group Therapy Note    Attendees: 9/20          Patient's Goal:  talk to doctor    Notes:  tolerated    Status After Intervention:  Unchanged    Participation Level: Minimal    Participation Quality: Attentive and Resistant      Speech:  normal      Thought Process/Content: Logical      Affective Functioning: Blunted      Mood: euthymic      Level of consciousness:  Alert      Response to Learning: Progressing to goal      Endings: None Reported    Modes of Intervention: Education      Discipline Responsible: Licensed Practical Nurse      Signature:  Larisa Vera LPN    
                                                                      Group Therapy Note    Date: 4/21/2025    Group Start Time: 1015  Group End Time: 1045  Group Topic: Psychoeducation    [facility]    Kaden Ren        Group Therapy Note    Attendees: 6/20       Patient's Goal:  PT will demonstrate increased interpersonal interaction and participate in group activities of discussing journaling..    Notes:  Patient is making progress, AEB participating in group discussion, actively listening, and supporting other group members    Status After Intervention:  Unchanged    Participation Level: Active Listener and Interactive    Participation Quality: Appropriate, Attentive, and Sharing      Speech:  normal      Thought Process/Content: Logical      Affective Functioning: Flat      Mood: depressed      Level of consciousness:  Alert, Oriented x4, and Attentive      Response to Learning: Able to verbalize/acknowledge new learning and Progressing to goal      Endings: None Reported    Modes of Intervention: Education, Support, and Socialization      Discipline Responsible: /Counselor      Signature:  Kaden Ren    
                                                                      Group Therapy Note    Date: 4/21/2025    Group Start Time: 1100  Group End Time: 1150  Group Topic: Cognitive Skills    [facility]    Rosemary Dahl CTRS        Group Therapy Note    Attendees: 8/20     Patient's Goal: To increase socialization, practice problem solving, and discuss leisure strategies to improve thought process.          Notes: Pt was pleasant and attentive but did not participate in Group .        Status After Intervention:  Improved     Participation Level:  Attentive, sharing at intervals , pleasant on approach by peers     Participation Quality: Attentive, sharing at intervals , passive r/t task      Speech:  Normal     Thought Process/Content: Logical , linear        Affective Functioning: Blunted, brightens      Mood: Passive in group, appears brighter ,less isolative than at admission      Level of consciousness:  Attentive and Alert      Response to Learning: Able to verbalize current knowledge/experience, Able to verbalize/acknowledge    new learning  and Progressing to goal      Endings: None Reported     Modes of Intervention: Education, Support, Exploration, Clarifying, and Problem solving      Discipline Responsible: Psychoeducational Specialist  Signature:  LEV BENITEZ  
                                                                      Group Therapy Note    Date: 4/21/2025    Group Start Time: 1325  Group End Time: 1350  Group Topic: Healthy Living/Wellness    [facility]    Rosemary Dahl CTRS        Group Therapy Note    Attendees: 7/20     Topic: To increase socialization , explore community resources provided by [facility], advocacy   and services, and benefits of peer support..           Comments:   Patient did not participate in Community Resources Group, at 13:25, despite staff encouragement and   explanation of benefits. Pt was seclusive to self et room.  Q15 minute safety checks maintained   for patient safety and will continue to encourage patient to attend unit programming.       Discipline Responsible: Psychoeducational Specialist   Signature: LEV BENITEZ   
                                                                      Group Therapy Note    Date: 4/22/2025    Group Start Time: 0900  Group End Time: 0930  Group Topic: Nursing    [facility]    Josette Pinto LPN        Group Therapy Note    Attendees: 5/20       Patient was offered group therapy today but declined to participate despite encouragement from staff. 1:1 was offered.           Signature:  Josette Pinto LPN    
                                                                      Group Therapy Note    Date: 4/22/2025    Group Start Time: 1000  Group End Time: 1030  Group Topic: Psychoeducation    [facility]    [facility]        Group Therapy Note    Attendees: 5/21   Patient was offered group therapy today but declined to participate despite encouragement from staff. 1:1 was offered.    Signature:  Kaden Ren    
                                                                      Group Therapy Note    Date: 4/22/2025    Group Start Time: 1100  Group End Time: 1135  Group Topic: Focus Group    [facility]    Dayami Garcia RN        Group Therapy Note    Attendees: 4/19       Patient's Goal:  To identify challenges after discharge    Notes:  Patient actively participated in group.     Status After Intervention:  Unchanged    Participation Level: Active Listener and Interactive    Participation Quality: Appropriate      Speech:  normal      Thought Process/Content: Logical      Affective Functioning: Congruent      Mood: euthymic      Level of consciousness:  Alert and Oriented x4      Response to Learning: Able to verbalize current knowledge/experience, Able to verbalize/acknowledge new learning, and Able to retain information      Endings: None Reported    Modes of Intervention: Education and Support      Discipline Responsible: Registered Nurse      Signature:  DAYAMI GARCIA RN    
                                                                      Group Therapy Note    Date: 4/23/2025    Group Start Time: 0930  Group End Time: 0950  Group Topic: Community Meeting    [facility]    Hiral Andersen        Group Therapy Note    Attendees: 3/14     Community Meeting Group Note        Date: April 23, 2025 Start Time:  0930   End Time:  0950      Number of Participants in Group & Unit Census:  3/14    Topic: Goals    Goal of Group: Set short term goal for the day, review discharge procedures      Comments:     Patient did not participate in Community Meeting group, despite staff encouragement and explanation of benefits.  Patient remain seclusive to self.  Q15 minute safety checks maintained for patient safety and will continue to encourage patient to attend unit programming.      
                                                                      Group Therapy Note    Date: 4/23/2025    Group Start Time: 1015  Group End Time: 1045  Group Topic: Psychoeducation    [facility]    [facility]        Group Therapy Note    Attendees: 4/14         Patient was offered group therapy today but declined to participate despite encouragement from staff. 1:1 was offered.  Signature:  Kaden Ren    
                                                                      Group Therapy Note    Date: 4/23/2025    Group Start Time: 1100  Group End Time: 1205  Group Topic: Cognitive Skills    [facility]    Rosemary Dahl CTRS        Group Therapy Note    Attendees: 4/14     Topic: To increase socialization, practice decision making and impulse control, and maintain concentration       Comments:   Patient did not participate in Cognitive Skills Group, at 11:00, despite staff encouragement and   explanation of benefits. Pt was seclusive to self et room.  Q15 minute safety checks maintained   for patient safety and will continue to encourage patient to attend unit programming.       Discipline Responsible: Psychoeducational Specialist   Signature: LEV BENITEZ   
                                                                      Group Therapy Note    Date: 4/23/2025    Group Start Time: 1330  Group End Time: 1415  Group Topic: Healthy Living/Wellness    [facility]    Hiral Andersen        Group Therapy Note    Attendees: 5/13       Health/Wellness Group Note        Date: April 23, 2025 Start Time: 1:30pm  End Time:  1415      Number of Participants in Group & Unit Census:  5/13    Topic: Wellness    Goal of Group: Identify positive coping skills and support system        Comments:     Patient did not participate in Health/Wellness group, despite staff encouragement and explanation of benefits.  Patient remain seclusive to self.  Q15 minute safety checks maintained for patient safety and will continue to encourage patient to attend unit programming.      
                                                                      Group Therapy Note    Date: 4/9/2025    Group Start Time: 0915  Group End Time: 0940  Group Topic: Community Meeting    [facility]    Hiral Andersen        Group Therapy Note    Attendees: 5/23     Community Meeting Group Note        Date: April 9, 2025 Start Time:  0915   End Time:  5/23      Number of Participants in Group & Unit Census:  5/23    Topic: Goals    Goal of Group: Set short term goal for the day      Comments:     Patient did not participate in Community Meeting group, despite staff encouragement and explanation of benefits.  Patient remain seclusive to self.  Q15 minute safety checks maintained for patient safety and will continue to encourage patient to attend unit programming.      
                                                                      Group Therapy Note    Date: 4/9/2025    Group Start Time: 1015  Group End Time: 1045  Group Topic: Psychoeducation    [facility]        Group Therapy Note    Attendees: 8/23   Patient was offered group therapy today but declined to participate despite encouragement from staff. 1:1 was offered.    Signature:  Kaden Ren    
                                                                      Group Therapy Note    Date: 4/9/2025    Group Start Time: 1100  Group End Time: 1150  Group Topic: Cognitive Skills    [facility]    Rosemary Dahl CTRS        Group Therapy Note    Attendees: 7/22       Topic: To increase socialization, practice decision making, and exploring perception,and communication skills.       Comments:   Patient did not participate in Cognitive Skills Group, at 11:00, despite staff encouragement and explanation   of benefits. Pt was seclusive to self et room.    Q15 minute safety checks maintained for patient safety and will continue to encourage   patient to attend unit programming.       Discipline Responsible: Psychoeducational Specialist   Signature: LEV BENITEZ   
                                                                      Group Therapy Note    Date: 4/9/2025    Group Start Time: 1330  Group End Time: 1425  Group Topic: Cognitive Skills    [facility]    Rosemary Dahl CTRS        Group Therapy Note    Attendees: 7/19     Topic: To increase socialization, practice self expression using art and writing, identifying self   calming and restorative positives r/t the senses, explored boundaries, and engaged in discussion      Comments:   Patient did not participate in Cognitive Skills Group, at 13:30, despite staff encouragement and explanation   of benefits. Pt was seclusive to self et room.    Q15 minute safety checks maintained for patient safety and will continue to encourage   patient to attend unit programming.       Discipline Responsible: Psychoeducational Specialist   Signature: LEV BENITEZ   
                                                                     Group Therapy Note    Date: 4/21/2025    Group Start Time: 2015  Group End Time: 2045  Group Topic: Wrap-Up    [facility]      Group Therapy Note    Attendees: 6/18     Notes:  Patient declined to attend Wrap-up group. 1:1 talk time offered as a alternative     Signature:  Gordo Cali RN    
Psych-Ed/Relapse Prevention Group Note        Date: April 11, 2025 Start Time: 1:30pm  End Time:  2:15pm      Number of Participants in Group & Unit Census:  1/16    Topic: Support and Coping    Goal of Group:Patient will identify healthy coping skills.  Patient will identify benefits of support for mental health.      Comments:     Patient did not participate in Psych-Ed/Relapse Prevention group, despite staff encouragement and explanation of benefits.  Patient remain seclusive to self.  Q15 minute safety checks maintained for patient safety and will continue to encourage patient to attend unit programming.         Signature:  ILYA RodriguezS    
Psych-Ed/Relapse Prevention Group Note        Date: April 14, 2025 Start Time: 1:30pm  End Time:  2:15pm      Number of Participants in Group & Unit Census:  8/18    Topic: Coping skills    Goal of Group:Patient will identify healthy coping skills and how to identify triggers and negative copings skills.        Comments:     Patient did not participate in Psych-Ed/Relapse Prevention group, despite staff encouragement and explanation of benefits.  Patient remain seclusive to self.  Q15 minute safety checks maintained for patient safety and will continue to encourage patient to attend unit programming.         Signature:  ILYA RodriguezS    
Wrap-Up Group Note        Date: April 17, 2025 Start Time: 2010  End Time: 2040      Number of Participants in Group & Unit Census:  8/14    Topic: wrap-up    Patient did not participate in wrap-up group despite staff encouragement and explanation of benefits.  Q15 minute safety checks maintained for patient safety and will continue to encourage patient to attend groups.    
Yes

## 2025-04-23 NOTE — TRANSITION OF CARE
Immunization History   Administered Date(s) Administered    COVID-19, PFIZER PURPLE top, DILUTE for use, (age 12 y+), 30mcg/0.3mL 04/28/2021    Influenza, AFLURIA, FLUZONE, (age4 y+), IM, Trivalent MDV, 0.5mL 10/23/2024     Influenza Vaccination Status: Influenza vaccine was received prior to admission during the current flu season, not during this hospitalization.    Screening for Metabolic Disorders for Patients on Antipsychotic Medications  (Data obtained from the EMR)    Estimated Body Mass Index  Body mass index is 28.59 kg/m².      Vital Signs/Blood Pressure  BP (!) 96/56   Pulse 76   Temp 98.1 °F (36.7 °C)   Resp 15   Ht 1.803 m (5' 11\")   Wt 93 kg (205 lb)   SpO2 97%   BMI 28.59 kg/m²      Fasting Blood Glucose or Hemoglobin A1c  No results found for: \"GLU\", \"GLUCPOC\"    Hemoglobin A1C   Date Value Ref Range Status   10/17/2024 5.2 4.0 - 6.0 % Final       Discharge Diagnosis: Major depressive disorder, recurrent severe without psychotic features (HCC)     Discharge Plan/Destination: [facility]    Discharge Medication List and Instructions:      Medication List        START taking these medications      cloZAPine 25 MG tablet  Commonly known as: CLOZARIL  Take 0.5 tablets by mouth 3 times daily  Notes to patient: Mood             CHANGE how you take these medications      hydrocortisone 5 MG tablet  Commonly known as: CORTEF  Take 3 tablets by mouth daily  Start taking on: April 24, 2025  What changed: when to take this  Notes to patient: Adrenal insufficiency        venlafaxine 75 MG extended release capsule  Commonly known as: EFFEXOR XR  Take 3 capsules by mouth daily (with breakfast)  Start taking on: April 24, 2025  What changed:   medication strength  how much to take  Notes to patient: Mood             CONTINUE taking these medications      ferrous sulfate 325 (65 Fe) MG tablet  Commonly known as: IRON 325  Take 1 tablet by mouth in the morning and at bedtime  Notes to patient: Supplement

## 2025-04-23 NOTE — BH NOTE
Patient given tobacco quitline number 90499851332 at this time, refusing to call at this time, states \" I just dont want to quit now\"- patient given information as to the dangers of long term tobacco use. Continue to reinforce the importance of tobacco cessation.

## 2025-04-23 NOTE — CARE COORDINATION
Name: Yaritza Esquivel    : 1975    Auth number: [id number]     Discharge Date: 2025    Destination: [facility]    *If you have any specific discharge questions, please contact the assigned /discharge planner: Hermelinda [phone]      Discharge Medications:      Medication List        START taking these medications      cloZAPine 25 MG tablet  Commonly known as: CLOZARIL  Take 0.5 tablets by mouth 3 times daily  Notes to patient: Mood             CHANGE how you take these medications      hydrocortisone 5 MG tablet  Commonly known as: CORTEF  Take 3 tablets by mouth daily  Start taking on: 2025  What changed: when to take this  Notes to patient: Adrenal insufficiency        venlafaxine 75 MG extended release capsule  Commonly known as: EFFEXOR XR  Take 3 capsules by mouth daily (with breakfast)  Start taking on: 2025  What changed:   medication strength  how much to take  Notes to patient: Mood             CONTINUE taking these medications      ferrous sulfate 325 (65 Fe) MG tablet  Commonly known as: IRON 325  Take 1 tablet by mouth in the morning and at bedtime  Notes to patient: Supplement      fludrocortisone 0.1 MG tablet  Commonly known as: FLORINEF  Take 1 tablet by mouth daily  Notes to patient: Salt-losing     folic acid 1 MG tablet  Commonly known as: FOLVITE  Take 1 tablet by mouth daily  Notes to patient: Supplement      levothyroxine 150 MCG tablet  Commonly known as: SYNTHROID  Take 1 tablet by mouth Daily  Notes to patient: Thyroid        omeprazole 20 MG delayed release capsule  Commonly known as: PRILOSEC  Take 1 capsule by mouth daily  Notes to patient: Stomach      vitamin B-12 1000 MCG tablet  Commonly known as: CYANOCOBALAMIN  Take 1 tablet by mouth daily  Notes to patient: Supplement              STOP taking these medications      prazosin 2 MG capsule  Commonly known as: MINIPRESS     risperiDONE 1 MG disintegrating tablet  Commonly known as:

## 2025-04-23 NOTE — DISCHARGE SUMMARY
Provider Discharge Summary     Patient ID:  Yaritza Esquivel  [id number]  49 y.o.  1975    Admit date: 4/8/2025    Discharge date and time: 4/23/2025  10:33 AM     Admitting Physician: Janice Ochoa MD     Discharge Physician: Janice Ochoa MD    Admission Diagnoses: Suicidal ideation [R45.851]  Depression with suicidal ideation [F32.A, R45.851]    Discharge Diagnoses:      Major depressive disorder, recurrent severe without psychotic features (HCC)     Patient Active Problem List   Diagnosis Code    Hypothyroidism E03.9    Schizophrenia F20.9    Bipolar depression (HCC) F31.9    Depression F32.A    Depression with suicidal ideation F32.A, R45.851    Major depressive disorder, recurrent severe without psychotic features (HCC) F33.2    Chronic midline low back pain without sciatica M54.50, G89.29    Scoliosis of lumbar spine M41.9    PTSD (post-traumatic stress disorder) F43.10    Suicidal ideation R45.851        Admission Condition: poor    Discharged Condition: stable    Indication for Admission: threat to self    History of Present Illnes (present tense wording is of findings from admission exam and are not necessarily indicative of current findings):   Yaritza Esquivel is a 49 y.o. female who has a past medical history of bipolar disorder, schizophrenia, seizures, COPD, hypothyroidism, CVA, parathyroidectomy, and bilateral adrenalectomy with pheochromocytoma . Patient presented to the ED via law enforcement. She reports calling 911 requesting help. She endorses suicidal ideation with plan to jump off a bridge or run into traffic. She denies homicidal ideation. She reports compliance taking medications and follow up at [facility]. She was recently discharge from [facility] on 4/1/2025. She admits on voluntary status.     Patient is seen today for initial assessment. She is found lying in bed awake. She requests to conduct interview at bedside with door open. She denies need for privacy. Roommate present duration of encounter.

## 2025-04-28 ENCOUNTER — HOSPITAL ENCOUNTER (INPATIENT)
Age: 50
LOS: 6 days | Discharge: HOME | DRG: 751 | End: 2025-05-05
Attending: EMERGENCY MEDICINE | Admitting: PSYCHIATRY & NEUROLOGY
Payer: COMMERCIAL

## 2025-04-28 DIAGNOSIS — F32.A DEPRESSION WITH SUICIDAL IDEATION: Primary | ICD-10-CM

## 2025-04-28 DIAGNOSIS — R45.851 DEPRESSION WITH SUICIDAL IDEATION: Primary | ICD-10-CM

## 2025-04-28 LAB
ALBUMIN SERPL-MCNC: 4.1 G/DL (ref 3.5–5.2)
ALP SERPL-CCNC: 130 U/L (ref 35–104)
ALT SERPL-CCNC: 24 U/L (ref 10–35)
AMPHET UR QL SCN: NEGATIVE
ANION GAP SERPL CALCULATED.3IONS-SCNC: 17 MMOL/L (ref 9–16)
AST SERPL-CCNC: 19 U/L (ref 10–35)
BARBITURATES UR QL SCN: NEGATIVE
BASOPHILS # BLD: 0.1 K/UL (ref 0–0.2)
BASOPHILS NFR BLD: 1 % (ref 0–2)
BENZODIAZ UR QL: NEGATIVE
BILIRUB SERPL-MCNC: <0.2 MG/DL (ref 0–1.2)
BUN SERPL-MCNC: 17 MG/DL (ref 6–20)
CALCIUM SERPL-MCNC: 10 MG/DL (ref 8.6–10.4)
CANNABINOIDS UR QL SCN: NEGATIVE
CHLORIDE SERPL-SCNC: 104 MMOL/L (ref 98–107)
CO2 SERPL-SCNC: 19 MMOL/L (ref 20–31)
COCAINE UR QL SCN: NEGATIVE
CREAT SERPL-MCNC: 0.8 MG/DL (ref 0.7–1.2)
EOSINOPHIL # BLD: 0.2 K/UL (ref 0–0.4)
EOSINOPHILS RELATIVE PERCENT: 2 % (ref 0–4)
ERYTHROCYTE [DISTWIDTH] IN BLOOD BY AUTOMATED COUNT: 14.6 % (ref 11.5–14.9)
ETHANOL PERCENT: NORMAL %
ETHANOLAMINE SERPL-MCNC: <10 MG/DL (ref 0–0.08)
FENTANYL UR QL: NEGATIVE
GFR, ESTIMATED: >90 ML/MIN/1.73M2
GLUCOSE SERPL-MCNC: 110 MG/DL (ref 74–99)
HCG SERPL QL: NEGATIVE
HCT VFR BLD AUTO: 42 % (ref 36–46)
HGB BLD-MCNC: 14.1 G/DL (ref 12–16)
LYMPHOCYTES NFR BLD: 1.7 K/UL (ref 1–4.8)
LYMPHOCYTES RELATIVE PERCENT: 16 % (ref 24–44)
MCH RBC QN AUTO: 30.6 PG (ref 26–34)
MCHC RBC AUTO-ENTMCNC: 33.6 G/DL (ref 31–37)
MCV RBC AUTO: 91.1 FL (ref 80–100)
METHADONE UR QL: NEGATIVE
MONOCYTES NFR BLD: 0.5 K/UL (ref 0.1–1.3)
MONOCYTES NFR BLD: 5 % (ref 1–7)
NEUTROPHILS NFR BLD: 76 % (ref 36–66)
NEUTS SEG NFR BLD: 8.6 K/UL (ref 1.3–9.1)
OPIATES UR QL SCN: NEGATIVE
OXYCODONE UR QL SCN: NEGATIVE
PCP UR QL SCN: POSITIVE
PLATELET # BLD AUTO: 450 K/UL (ref 150–450)
PMV BLD AUTO: 6.9 FL (ref 6–12)
POTASSIUM SERPL-SCNC: 4.1 MMOL/L (ref 3.7–5.3)
PROT SERPL-MCNC: 8 G/DL (ref 6.6–8.7)
RBC # BLD AUTO: 4.61 M/UL (ref 4–5.2)
SODIUM SERPL-SCNC: 140 MMOL/L (ref 136–145)
TEST INFORMATION: ABNORMAL
WBC OTHER # BLD: 11.1 K/UL (ref 3.5–11)

## 2025-04-28 PROCEDURE — 80307 DRUG TEST PRSMV CHEM ANLYZR: CPT

## 2025-04-28 PROCEDURE — 99285 EMERGENCY DEPT VISIT HI MDM: CPT

## 2025-04-28 PROCEDURE — 80053 COMPREHEN METABOLIC PANEL: CPT

## 2025-04-28 PROCEDURE — 85025 COMPLETE CBC W/AUTO DIFF WBC: CPT

## 2025-04-28 PROCEDURE — G0480 DRUG TEST DEF 1-7 CLASSES: HCPCS

## 2025-04-28 PROCEDURE — 36415 COLL VENOUS BLD VENIPUNCTURE: CPT

## 2025-04-28 PROCEDURE — 84703 CHORIONIC GONADOTROPIN ASSAY: CPT

## 2025-04-28 NOTE — PROGRESS NOTES
Pharmacy Medication History Note    List of current medications patient is taking is complete.    Patient was discharged from [facility] on 4/23/2025.  No change to medication list from [facility].    OARRS report negative    Current Home Medication List at Time of Admission:  Prior to Admission medications    Medication Sig   venlafaxine (EFFEXOR XR) 75 MG extended release capsule Take 3 capsules by mouth daily (with breakfast)   cloZAPine (CLOZARIL) 25 MG tablet Take 0.5 tablets by mouth 3 times daily   omeprazole (PRILOSEC) 20 MG delayed release capsule Take 1 capsule by mouth daily   fludrocortisone (FLORINEF) 0.1 MG tablet Take 1 tablet by mouth daily   levothyroxine (SYNTHROID) 150 MCG tablet Take 1 tablet by mouth Daily   hydrocortisone (CORTEF) 5 MG tablet Take 3 tablets by mouth daily   folic acid (FOLVITE) 1 MG tablet Take 1 tablet by mouth daily   ferrous sulfate (IRON 325) 325 (65 Fe) MG tablet Take 1 tablet by mouth in the morning and at bedtime   vitamin B-12 (CYANOCOBALAMIN) 1000 MCG tablet Take 1 tablet by mouth daily     Please let me know if you have any questions about this encounter. Thank you!    Electronically signed by GERMAINE HUDSON RPH on 4/28/2025 at 6:46 PM

## 2025-04-28 NOTE — ED NOTES
Provisional Diagnosis:   Depression with suicidal ideation     Psychosocial and Contextual Factors: Pt resides at the homeless shelter the [facility]. Pt has issues with social enviroment. Pt has issues with relationships.     C-SSRS Summary:    Patient: X    Family:     Agency: X (EPIC)    Present Suicidal Behavior:     Verbal: X    Attempt:     Past Suicidal Behavior:     Verbal: X    Attempt: X    Self- Injurious/ Self-Mutilation:  Pt denies    Trauma History: None reported at this time.    Protective Factors: Pt has insurance.     Risk Factors: Pt has poor judgement and coping skills. Frequent hospitalizations.     Substance Abuse: Pt denies    Clinical Summary:  Yaritza Esquivel is a 49 year old female who presents to the ED via self. Pt is suicidal with a plan to overdose on  ferbreeze. Pt unable to identify any contributing factors to pt's SI. Pt states pt has been feeling \"really suicidal\" over the past couple of months and pt does not know why. Pt was admitted to the [facility] 3/21/25- 4/1/25 and 4/8/25- 4/23/25. Pt states pt told the doctor pt was still suicidal and was not ready to be discharged. Pt was admitted to [facility] yesterday and was discharged this morning. Pt states pt wanted to be discharged because the doctor was not giving pt the proper medications. Pt states pt has been compliant taking pt's medications and following up on an outpatient basis since pt was discharged from the [facility]. Pt denies HI/AH/VH. Pt states pt has attempted suicide in the past. Pt denies substance abuse.     Level of Care Disposition:.MANDI consulted with Dr. Ochoa from psychiatry. Pt declined an admission to the [facility] at this time as pt does not benefit from hospitalization.     Pt stating pt is unable to contract for pt's safety leaving the ED as pt \"feels really suicidal.\"     ED Dr requesting psych consult.

## 2025-04-28 NOTE — ED NOTES
Safeguard in PPU for patient watch. Safeguard informed that they need to stay with the patient at all time, must be present in the room and if they need a break or relief to let the nurse know so they can be replaced. Safeguard verbalizes understanding. Belongings and patient checked by security. Belongings locked up. Pt in green gown.

## 2025-04-28 NOTE — ED PROVIDER NOTES
EMERGENCY DEPARTMENT ENCOUNTER    Pt Name: Yaritza Esquivel  MRN: [id number]  Birthdate 1975  Date of evaluation: 4/28/25  CHIEF COMPLAINT       Chief Complaint   Patient presents with    Suicidal     HISTORY OF PRESENT ILLNESS   HPI  Suicidal thoughts.  Severe depression.  Compliant with her medications.  Living at the [facility] women shelter.  Following up with [facility].  Recent admission here.  Denies any drug or alcohol use.      REVIEW OF SYSTEMS     Review of Systems   All other systems reviewed and are negative.    PASTMEDICAL HISTORY     Past Medical History:   Diagnosis Date    Adrenal insufficiency     Depression     Diabetes mellitus type 2, diet-controlled ([facility])     Headache     Hypothyroidism     MEN (multiple endocrine neoplasia) ([facility])      Past Problem List  Patient Active Problem List   Diagnosis Code    Hypothyroidism E03.9    Schizophrenia ([facility]) F20.9    Bipolar depression ([facility]) F31.9    Depression F32.A    Depression with suicidal ideation F32.A, R45.851    Major depressive disorder, recurrent severe without psychotic features ([facility]) F33.2    Chronic midline low back pain without sciatica M54.50, G89.29    Scoliosis of lumbar spine M41.9    PTSD (post-traumatic stress disorder) F43.10    Suicidal ideation R45.851     SURGICAL HISTORY       Past Surgical History:   Procedure Laterality Date    THYROIDECTOMY      TONSILLECTOMY      TOTAL HIP ARTHROPLASTY Right      CURRENT MEDICATIONS       Previous Medications    CLOZAPINE (CLOZARIL) 25 MG TABLET    Take 0.5 tablets by mouth 3 times daily    FERROUS SULFATE (IRON 325) 325 (65 FE) MG TABLET    Take 1 tablet by mouth in the morning and at bedtime    FLUDROCORTISONE (FLORINEF) 0.1 MG TABLET    Take 1 tablet by mouth daily    FOLIC ACID (FOLVITE) 1 MG TABLET    Take 1 tablet by mouth daily    HYDROCORTISONE (CORTEF) 5 MG TABLET    Take 3 tablets by mouth daily    LEVOTHYROXINE (SYNTHROID) 150 MCG TABLET    Take 1 tablet by mouth Daily    OMEPRAZOLE

## 2025-04-28 NOTE — ED NOTES
Pt brought herself to the ED for SI. Pt denies any HI, VH, AH. Pt stated that \"she would od to kill herself\". Pt stated \"she has been depressed\". Pt is linked with [facility]. Pt dines any drug or alcohol use.

## 2025-04-29 PROCEDURE — 99222 1ST HOSP IP/OBS MODERATE 55: CPT | Performed by: INTERNAL MEDICINE

## 2025-04-29 PROCEDURE — APPSS60 APP SPLIT SHARED TIME 46-60 MINUTES: Performed by: NURSE PRACTITIONER

## 2025-04-29 PROCEDURE — 1240000000 HC EMOTIONAL WELLNESS R&B

## 2025-04-29 PROCEDURE — 99222 1ST HOSP IP/OBS MODERATE 55: CPT | Performed by: PSYCHIATRY & NEUROLOGY

## 2025-04-29 PROCEDURE — 6370000000 HC RX 637 (ALT 250 FOR IP): Performed by: INTERNAL MEDICINE

## 2025-04-29 RX ORDER — FOLIC ACID 1 MG/1
1 TABLET ORAL DAILY
Status: DISCONTINUED | OUTPATIENT
Start: 2025-04-29 | End: 2025-05-05 | Stop reason: HOSPADM

## 2025-04-29 RX ORDER — POLYETHYLENE GLYCOL 3350 17 G/17G
17 POWDER, FOR SOLUTION ORAL DAILY PRN
Status: DISCONTINUED | OUTPATIENT
Start: 2025-04-29 | End: 2025-05-05 | Stop reason: HOSPADM

## 2025-04-29 RX ORDER — KETOCONAZOLE 20 MG/ML
SHAMPOO, SUSPENSION TOPICAL DAILY
Status: DISCONTINUED | OUTPATIENT
Start: 2025-04-30 | End: 2025-05-05 | Stop reason: HOSPADM

## 2025-04-29 RX ORDER — HYDROCORTISONE 10 MG/1
15 TABLET ORAL DAILY
Status: DISCONTINUED | OUTPATIENT
Start: 2025-04-29 | End: 2025-05-05 | Stop reason: HOSPADM

## 2025-04-29 RX ORDER — ACETAMINOPHEN 325 MG/1
650 TABLET ORAL EVERY 4 HOURS PRN
Status: DISCONTINUED | OUTPATIENT
Start: 2025-04-29 | End: 2025-05-05 | Stop reason: HOSPADM

## 2025-04-29 RX ORDER — LANOLIN ALCOHOL/MO/W.PET/CERES
1000 CREAM (GRAM) TOPICAL DAILY
Status: DISCONTINUED | OUTPATIENT
Start: 2025-04-30 | End: 2025-05-05 | Stop reason: HOSPADM

## 2025-04-29 RX ORDER — HALOPERIDOL 5 MG/ML
5 INJECTION INTRAMUSCULAR EVERY 6 HOURS PRN
Status: DISCONTINUED | OUTPATIENT
Start: 2025-04-29 | End: 2025-05-05 | Stop reason: HOSPADM

## 2025-04-29 RX ORDER — HYDROXYZINE HYDROCHLORIDE 50 MG/1
50 TABLET, FILM COATED ORAL 3 TIMES DAILY PRN
Status: DISCONTINUED | OUTPATIENT
Start: 2025-04-29 | End: 2025-05-05 | Stop reason: HOSPADM

## 2025-04-29 RX ORDER — MAGNESIUM HYDROXIDE/ALUMINUM HYDROXICE/SIMETHICONE 120; 1200; 1200 MG/30ML; MG/30ML; MG/30ML
30 SUSPENSION ORAL EVERY 6 HOURS PRN
Status: DISCONTINUED | OUTPATIENT
Start: 2025-04-29 | End: 2025-05-05 | Stop reason: HOSPADM

## 2025-04-29 RX ORDER — FLUDROCORTISONE ACETATE 0.1 MG/1
0.1 TABLET ORAL DAILY
Status: DISCONTINUED | OUTPATIENT
Start: 2025-04-29 | End: 2025-05-05 | Stop reason: HOSPADM

## 2025-04-29 RX ORDER — KETOCONAZOLE 20 MG/ML
SHAMPOO, SUSPENSION TOPICAL DAILY
Status: DISCONTINUED | OUTPATIENT
Start: 2025-04-29 | End: 2025-04-29

## 2025-04-29 RX ORDER — POLYETHYLENE GLYCOL 3350 17 G
2 POWDER IN PACKET (EA) ORAL
Status: DISCONTINUED | OUTPATIENT
Start: 2025-04-29 | End: 2025-05-05 | Stop reason: HOSPADM

## 2025-04-29 RX ORDER — PANTOPRAZOLE SODIUM 40 MG/1
40 TABLET, DELAYED RELEASE ORAL
Status: DISCONTINUED | OUTPATIENT
Start: 2025-04-30 | End: 2025-05-05 | Stop reason: HOSPADM

## 2025-04-29 RX ORDER — LEVOTHYROXINE SODIUM 150 UG/1
150 TABLET ORAL DAILY
Status: DISCONTINUED | OUTPATIENT
Start: 2025-04-30 | End: 2025-05-05 | Stop reason: HOSPADM

## 2025-04-29 RX ORDER — LORAZEPAM 1 MG/1
2 TABLET ORAL EVERY 6 HOURS PRN
Status: DISCONTINUED | OUTPATIENT
Start: 2025-04-29 | End: 2025-05-05 | Stop reason: HOSPADM

## 2025-04-29 RX ORDER — DIPHENHYDRAMINE HYDROCHLORIDE 50 MG/ML
50 INJECTION, SOLUTION INTRAMUSCULAR; INTRAVENOUS EVERY 6 HOURS PRN
Status: DISCONTINUED | OUTPATIENT
Start: 2025-04-29 | End: 2025-05-05 | Stop reason: HOSPADM

## 2025-04-29 RX ORDER — LORAZEPAM 2 MG/ML
2 INJECTION INTRAMUSCULAR EVERY 6 HOURS PRN
Status: DISCONTINUED | OUTPATIENT
Start: 2025-04-29 | End: 2025-05-05 | Stop reason: HOSPADM

## 2025-04-29 RX ORDER — HALOPERIDOL 5 MG/1
5 TABLET ORAL EVERY 6 HOURS PRN
Status: DISCONTINUED | OUTPATIENT
Start: 2025-04-29 | End: 2025-05-05 | Stop reason: HOSPADM

## 2025-04-29 RX ADMIN — FOLIC ACID 1 MG: 1 TABLET ORAL at 14:50

## 2025-04-29 RX ADMIN — HYDROCORTISONE 15 MG: 10 TABLET ORAL at 14:51

## 2025-04-29 RX ADMIN — FLUDROCORTISONE ACETATE 0.1 MG: 0.1 TABLET ORAL at 14:51

## 2025-04-29 ASSESSMENT — PATIENT HEALTH QUESTIONNAIRE - PHQ9
8. MOVING OR SPEAKING SO SLOWLY THAT OTHER PEOPLE COULD HAVE NOTICED. OR THE OPPOSITE, BEING SO FIGETY OR RESTLESS THAT YOU HAVE BEEN MOVING AROUND A LOT MORE THAN USUAL: NOT AT ALL
9. THOUGHTS THAT YOU WOULD BE BETTER OFF DEAD, OR OF HURTING YOURSELF: SEVERAL DAYS
6. FEELING BAD ABOUT YOURSELF - OR THAT YOU ARE A FAILURE OR HAVE LET YOURSELF OR YOUR FAMILY DOWN: SEVERAL DAYS
10. IF YOU CHECKED OFF ANY PROBLEMS, HOW DIFFICULT HAVE THESE PROBLEMS MADE IT FOR YOU TO DO YOUR WORK, TAKE CARE OF THINGS AT HOME, OR GET ALONG WITH OTHER PEOPLE: VERY DIFFICULT
1. LITTLE INTEREST OR PLEASURE IN DOING THINGS: MORE THAN HALF THE DAYS
2. FEELING DOWN, DEPRESSED OR HOPELESS: MORE THAN HALF THE DAYS
SUM OF ALL RESPONSES TO PHQ QUESTIONS 1-9: 10
3. TROUBLE FALLING OR STAYING ASLEEP: SEVERAL DAYS
5. POOR APPETITE OR OVEREATING: SEVERAL DAYS
SUM OF ALL RESPONSES TO PHQ QUESTIONS 1-9: 11
SUM OF ALL RESPONSES TO PHQ QUESTIONS 1-9: 11
4. FEELING TIRED OR HAVING LITTLE ENERGY: SEVERAL DAYS
SUM OF ALL RESPONSES TO PHQ QUESTIONS 1-9: 11
7. TROUBLE CONCENTRATING ON THINGS, SUCH AS READING THE NEWSPAPER OR WATCHING TELEVISION: MORE THAN HALF THE DAYS

## 2025-04-29 ASSESSMENT — LIFESTYLE VARIABLES
HOW MANY STANDARD DRINKS CONTAINING ALCOHOL DO YOU HAVE ON A TYPICAL DAY: PATIENT DOES NOT DRINK
HOW MANY STANDARD DRINKS CONTAINING ALCOHOL DO YOU HAVE ON A TYPICAL DAY: PATIENT DOES NOT DRINK
HOW OFTEN DO YOU HAVE A DRINK CONTAINING ALCOHOL: NEVER
HOW OFTEN DO YOU HAVE A DRINK CONTAINING ALCOHOL: NEVER

## 2025-04-29 ASSESSMENT — SLEEP AND FATIGUE QUESTIONNAIRES
SLEEP PATTERN: DIFFICULTY FALLING ASLEEP;DISTURBED/INTERRUPTED SLEEP;RESTLESSNESS;NIGHTMARES/TERRORS
AVERAGE NUMBER OF SLEEP HOURS: 6
DO YOU HAVE DIFFICULTY SLEEPING: YES
DO YOU USE A SLEEP AID: NO

## 2025-04-29 NOTE — CARE COORDINATION
BHI Biopsychosocial Assessment    Current Level of Psychosocial Functioning     Independent XX  Dependent    Minimal Assist     Comments:    Psychosocial High Risk Factors (check all that apply)    Unable to obtain meds   Chronic illness/pain    Substance abuse   Lack of Family Support   Financial stress   Isolation   Inadequate Community Resources  Suicide attempt(s)  Not taking medications   Victim of crime   Developmental Delay  Unable to manage personal needs    Age 65 or older   Homeless XX  No transportation   Readmission within 30 days XX  Unemployment  Traumatic Event    Comments:   Psychiatric Advanced Directives: n/a    Family to Involve in Treatment: denies    Sexual Orientation: n/a     Patient Strengths: Patient is linked to [facility], has insurance    Patient Barriers: Hx of admissions, homelessness      Opiate Education Provided: denies       CMHC/mental health history: Linked to [facility]    Plan of Care   medication management, group/individual therapies, family meetings, psycho -education, treatment team meetings to assist with stabilization    Initial Discharge Plan: Patient wants to complete MST meeting with [facility], continue established services with [facility]       Clinical Summary: Patient is a 49 year old female admitted to [facility] for suicidal ideation. Patient has a hx of admissions. Patient was recently discharged on 4/23/2025. Patient states she is suicidal and is not sure why. Patient states she has been on a thirty day ban from [facility] since 4/10. She wants to complete an MST meeting. Patient denies having social support. Patient denies substance abuse. Patient denies legal problems. Patient is linked to [facility] and plans to continue there at the time of discharge. Patient endorses suicidal ideation during social work assessment. Social work to provide support and assist with discharge planning.

## 2025-04-29 NOTE — ED NOTES
Psych NP in to see patient. Patient continues to endorse feeling hopeless and having suicidal ideations. Patient will be admitted to the [facility].

## 2025-04-29 NOTE — PROGRESS NOTES

## 2025-04-29 NOTE — H&P
IN-PATIENT SERVICE  [facility] Internal Medicine    CONSULTATION / HISTORY AND PHYSICAL EXAMINATION            Date:   4/29/2025  Patient name:  Yaritza Esquivel  Date of admission:  4/28/2025  4:57 PM  MRN:   [id number]  Account:  [id number]  YOB: 1975  PCP:    Jimena, Unspecified (Inactive)  Room:   [address] [place] [address]  Code Status:    Full Code    Physician Requesting Consult: Janice Ochoa MD    Reason for Consult:  medical management    Chief Complaint:     Chief Complaint   Patient presents with    Suicidal       History Obtained From:     Patient medical record nursing staff    History of Present Illness:   Patient, has past medical history multiple medical problems which include hypothyroidism, Brinklow's disease, family history of M EN 2 syndrome status post parathyroidectomy, bilateral adrenalectomy on hormone replacement treatment, patient was recently discharged from [facility] unit with depression and suicidal ideation  Admitted again with worsening depression  Claimed that she is compliant with her diet medication  UDS positive for phencyclidine  Patient requesting ketoconazole shampoo, has dryness in her scalp    Past Medical History:     Past Medical History:   Diagnosis Date    Adrenal insufficiency     Depression     Diabetes mellitus type 2, diet-controlled (HCC)     Headache     Hypothyroidism     MEN (multiple endocrine neoplasia) (HCC)         Past Surgical History:     Past Surgical History:   Procedure Laterality Date    THYROIDECTOMY      TONSILLECTOMY      TOTAL HIP ARTHROPLASTY Right         Medications Prior to Admission:     Prior to Admission medications    Medication Sig Start Date End Date Taking? Authorizing Provider   venlafaxine (EFFEXOR XR) 75 MG extended release capsule Take 3 capsules by mouth daily (with breakfast) 4/24/25   Janice Ochoa MD   cloZAPine (CLOZARIL) 25 MG tablet Take 0.5 tablets by mouth 3 times daily 4/23/25   Janice Ochoa MD 
and urgency.   Musculoskeletal: Negative for falls and joint pain.   Skin: Negative for itching and rash.   Neurological: Negative for tremors, seizures and weakness.   Endo/Heme/Allergies: Does not bruise/bleed easily.      Mental Status Examination:    Level of consciousness: Awake and alert  Appearance:  Appropriate attire, resting in bed, fair grooming   Behavior/Motor: Approachable, engages with interviewer, no psychomotor abnormalities  Attitude toward examiner:  Cooperative, attentive, poor eye contact  Speech: Sow, low volume, depressed tone.  Mood: \"depressed\"  Affect: Mood congruent  Thought processes:  Goal directed, linear  Thought content: Endorses suicidal ideation              Denies homicidal ideations               Denies hallucinations              Denies delusions              Denies paranoia  Cognition:  Oriented to self, location, time, situation  Concentration: Clinically adequate  Memory: Intact  Insight &Judgment: Poor         DSM-5 Diagnosis    Principal Problem:  Major depressive disorder, recurrent, severe, without psychosis                                  Psychosocial and Contextual factors:  Financial X  Occupational   Relationship   Legal   Living situation X  Educational     Past Medical History:   Diagnosis Date    Adrenal insufficiency     Depression     Diabetes mellitus type 2, diet-controlled ([facility])     Headache     Hypothyroidism     MEN (multiple endocrine neoplasia) ([facility])         PATIENT HANDOFF:  Home medications reviewed.   Medication management per attending.  Patient has an appointment scheduled with [facility] at 1:15 tomorrow. Plan to discharge directly to appointment.   Monitor need and frequency of PRN medications.    CONSULT:  [x] Yes [] No  Internal medicine for medical management/medical H&P      Risk Management: close watch per standard protocol      Psychotherapy: participation in milieu and group and individual sessions with Attending Physician, Social

## 2025-04-29 NOTE — ED NOTES
Pt pecefully resting on recliner in PPU. Pt has good chest rise and fall. Unable to engage pt at this time.

## 2025-04-29 NOTE — PROGRESS NOTES
Behavioral Services  Medicare Certification Upon Admission    I certify that this patient's inpatient psychiatric hospital admission is medically necessary for:    [x] (1) Treatment which could reasonably be expected to improve this patient's condition,       [x] (2) Or for diagnostic study;     AND     [x](2) The inpatient psychiatric services are provided while the individual is under the care of a physician and are included in the individualized plan of care.    Estimated length of stay/service 5    Plan for post-hospital care shelter    Electronically signed by Janice Ochoa MD on 4/29/2025 at 12:36 PM

## 2025-04-30 PROCEDURE — 6370000000 HC RX 637 (ALT 250 FOR IP): Performed by: PSYCHIATRY & NEUROLOGY

## 2025-04-30 PROCEDURE — 6370000000 HC RX 637 (ALT 250 FOR IP): Performed by: INTERNAL MEDICINE

## 2025-04-30 PROCEDURE — 1240000000 HC EMOTIONAL WELLNESS R&B

## 2025-04-30 PROCEDURE — 90833 PSYTX W PT W E/M 30 MIN: CPT | Performed by: PSYCHIATRY & NEUROLOGY

## 2025-04-30 PROCEDURE — 6370000000 HC RX 637 (ALT 250 FOR IP): Performed by: NURSE PRACTITIONER

## 2025-04-30 PROCEDURE — 99232 SBSQ HOSP IP/OBS MODERATE 35: CPT | Performed by: PSYCHIATRY & NEUROLOGY

## 2025-04-30 PROCEDURE — 99232 SBSQ HOSP IP/OBS MODERATE 35: CPT | Performed by: INTERNAL MEDICINE

## 2025-04-30 RX ORDER — FERROUS SULFATE 325(65) MG
325 TABLET ORAL 2 TIMES DAILY
Status: DISCONTINUED | OUTPATIENT
Start: 2025-04-30 | End: 2025-05-05 | Stop reason: HOSPADM

## 2025-04-30 RX ORDER — VENLAFAXINE HYDROCHLORIDE 75 MG/1
225 CAPSULE, EXTENDED RELEASE ORAL
Qty: 30 CAPSULE | Refills: 3 | Status: CANCELLED | OUTPATIENT
Start: 2025-04-30

## 2025-04-30 RX ORDER — CLOZAPINE 25 MG/1
12.5 TABLET ORAL 3 TIMES DAILY
Qty: 30 TABLET | Refills: 3 | Status: CANCELLED | OUTPATIENT
Start: 2025-04-30

## 2025-04-30 RX ORDER — CLOZAPINE 25 MG/1
12.5 TABLET ORAL 3 TIMES DAILY
Status: DISCONTINUED | OUTPATIENT
Start: 2025-04-30 | End: 2025-05-01

## 2025-04-30 RX ORDER — LIDOCAINE 4 G/G
1 PATCH TOPICAL DAILY
Status: DISCONTINUED | OUTPATIENT
Start: 2025-04-30 | End: 2025-05-05 | Stop reason: HOSPADM

## 2025-04-30 RX ADMIN — Medication 3 MG: at 20:44

## 2025-04-30 RX ADMIN — CLOZAPINE 12.5 MG: 25 TABLET ORAL at 20:44

## 2025-04-30 RX ADMIN — VENLAFAXINE HYDROCHLORIDE 225 MG: 150 CAPSULE, EXTENDED RELEASE ORAL at 09:44

## 2025-04-30 RX ADMIN — HYDROCORTISONE 15 MG: 10 TABLET ORAL at 09:45

## 2025-04-30 RX ADMIN — CYANOCOBALAMIN TAB 1000 MCG 1000 MCG: 1000 TAB at 09:44

## 2025-04-30 RX ADMIN — HYDROXYZINE HYDROCHLORIDE 50 MG: 50 TABLET ORAL at 20:44

## 2025-04-30 RX ADMIN — KETOCONAZOLE: 20 SHAMPOO, SUSPENSION TOPICAL at 09:42

## 2025-04-30 RX ADMIN — FLUDROCORTISONE ACETATE 0.1 MG: 0.1 TABLET ORAL at 09:44

## 2025-04-30 RX ADMIN — CLOZAPINE 12.5 MG: 25 TABLET ORAL at 09:44

## 2025-04-30 RX ADMIN — Medication 325 MG: at 20:44

## 2025-04-30 RX ADMIN — FOLIC ACID 1 MG: 1 TABLET ORAL at 09:44

## 2025-04-30 RX ADMIN — Medication 325 MG: at 09:45

## 2025-04-30 RX ADMIN — PANTOPRAZOLE SODIUM 40 MG: 40 TABLET, DELAYED RELEASE ORAL at 06:15

## 2025-04-30 RX ADMIN — LEVOTHYROXINE SODIUM 150 MCG: 0.15 TABLET ORAL at 06:15

## 2025-04-30 NOTE — GROUP NOTE
Group Therapy Note    Date: 4/30/2025    Group Start Time: 1000  Group End Time: 1030  Group Topic: Psychoeducation    [facility]    Kaden Ren        Group Therapy Note    Attendees: 11/22     Patient was offered group therapy today but declined to participate despite encouragement from staff. 1:1 was offered.  Signature:  Kaden Ren

## 2025-04-30 NOTE — GROUP NOTE
Group Therapy Note    Date: 4/30/2025    Group Start Time: 1430  Group End Time: 1545  Group Topic: Cognitive Skills    [facility]    Rosemary Dahl CTRS        Group Therapy Note    Attendees: 13/16     Topic: : To increase socialization, practice self expression, explore feelings and   positive relaxation sources/strategies r/t self calming and soothing the senses using creative expression,   and discussion.        Comments:   Patient did not participate in Cognitive Skills Group, at 14:30, despite staff encouragement and   explanation of benefits. Pt was seclusive to self et room.  Q15 minute safety checks maintained   for patient safety and will continue to encourage patient to attend unit programming.       Discipline Responsible: Psychoeducational Specialist   Signature: LEV BENITEZ

## 2025-04-30 NOTE — PROGRESS NOTES
IN-PATIENT SERVICE  [facility] Internal Medicine    CONSULTATION / HISTORY AND PHYSICAL EXAMINATION            Date:   4/30/2025  Patient name:  Yaritza Esquivel  Date of admission:  4/28/2025  4:57 PM  MRN:   [id number]  Account:  [id number]  YOB: 1975  PCP:    Jimena, Unspecified (Inactive)  Room:   [address] [place] [address]  Code Status:    Full Code    Physician Requesting Consult: Janice Ochoa MD    Reason for Consult:  medical management    Chief Complaint:     Chief Complaint   Patient presents with    Suicidal       History Obtained From:     Patient medical record nursing staff    History of Present Illness:   Patient, has past medical history multiple medical problems which include hypothyroidism, Carlisle's disease, family history of M EN 2 syndrome status post parathyroidectomy, bilateral adrenalectomy on hormone replacement treatment, patient was recently discharged from [facility] unit with depression and suicidal ideation  Admitted again with worsening depression  Claimed that she is compliant with her diet medication  UDS positive for phencyclidine  Patient requesting ketoconazole shampoo, has dryness in her scalp    Past Medical History:     Past Medical History:   Diagnosis Date    Adrenal insufficiency     Depression     Diabetes mellitus type 2, diet-controlled (HCC)     Headache     Hypothyroidism     MEN (multiple endocrine neoplasia) (HCC)         Past Surgical History:     Past Surgical History:   Procedure Laterality Date    THYROIDECTOMY      TONSILLECTOMY      TOTAL HIP ARTHROPLASTY Right         Medications Prior to Admission:     Prior to Admission medications    Medication Sig Start Date End Date Taking? Authorizing Provider   venlafaxine (EFFEXOR XR) 75 MG extended release capsule Take 3 capsules by mouth daily (with breakfast) 4/24/25   Janice Ochoa MD   cloZAPine (CLOZARIL) 25 MG tablet Take 0.5 tablets by mouth 3 times daily 4/23/25   Janice Ochoa MD

## 2025-04-30 NOTE — PLAN OF CARE
Problem: Self Harm/Suicidality  Goal: Will have no self-injury during hospital stay  Description: INTERVENTIONS:1.  Ensure constant observer at bedside with Q15M safety checks2.  Maintain a safe environment3.  Secure patient belongings4.  Ensure family/visitors adhere to safety recommendations5.  Ensure safety tray has been added to patient's diet order6.  Every shift and PRN: Re-assess suicidal risk via Frequent Screener  4/30/2025 1251 by Cole Wilks LPN  Outcome: Progressing     Problem: Depression  Goal: Will be euthymic at discharge  Description: INTERVENTIONS:1. Administer medication as ordered2. Provide emotional support via 1:1 interaction with staff3. Encourage involvement in milieu/groups/activities4. Monitor for social isolation  4/30/2025 1251 by Cole Wilks LPN  Outcome: Progressing     Problem: Anxiety  Goal: Will report anxiety at manageable levels  Description: INTERVENTIONS:1. Administer medication as ordered2. Teach and rehearse alternative coping skills3. Provide emotional support with 1:1 interaction with staff  Outcome: Progressing   Patient alert and oriented x 4,  Patient positive for anxiety/depression, isolative to room and self/withdrawn.  Patient states has thoughts to harm self but not others suicidal ideations but states has no homicidal ideations at this time.  Patient hopeless/helpless.   Patient states thinks she here in the way and just existing patient redirected and also speaking with physician about mood.  Medication regimen given and tolerated well observe.

## 2025-04-30 NOTE — GROUP NOTE
Group Therapy Note    Date: 4/30/2025    Group Start Time: 0900  Group End Time: 0930  Group Topic: Community Meeting    [facility]    Hiral Andersen        Group Therapy Note    Attendees: 5/22     Community Meeting Group Note        Date: April 30, 2025 Start Time: 9am  End Time:  0930      Number of Participants in Group & Unit Census:  5/22    Topic: Goals/Discharge    Goal of Group: Set short term goals for the day, review discharge procedures      Comments:     Patient did not participate in Community Meeting group, despite staff encouragement and explanation of benefits.  Patient remain seclusive to self.  Q15 minute safety checks maintained for patient safety and will continue to encourage patient to attend unit programming.

## 2025-04-30 NOTE — PLAN OF CARE
Problem: Self Harm/Suicidality  Goal: Will have no self-injury during hospital stay  Description: INTERVENTIONS:1.  Ensure constant observer at bedside with Q15M safety checks2.  Maintain a safe environment3.  Secure patient belongings4.  Ensure family/visitors adhere to safety recommendations5.  Ensure safety tray has been added to patient's diet order6.  Every shift and PRN: Re-assess suicidal risk via Frequent Screener  Outcome: Not Progressing  Flowsheets (Taken 4/30/2025 0243)  Will have no self-injury during hospital stay:   Every shift and PRN: Re-assess suicidal risk via Frequent Screener   Ensure family/visitors adhere to safety recommendations   Ensure constant observer at bedside with Q15M safety checks   Maintain a safe environment   Ensure safety tray has been added to patient's diet order   Secure patient belongings  Note: Patient continues to have suicidal ideations at this time. Patient agreed to seek staff at anytime they felt like any urges to harm self would arise. Patient does contract for safety while on the unit. Safety checks maintained every 15 minutes. Staff ensures patient safety by checking on patient intermittently.       Problem: Depression  Goal: Will be euthymic at discharge  Description: INTERVENTIONS:1. Administer medication as ordered2. Provide emotional support via 1:1 interaction with staff3. Encourage involvement in milieu/groups/activities4. Monitor for social isolation  Outcome: Not Progressing  Note: Patient remains free from harm to self or others but admits to continued anxiety and depression. Medications available upon request. Staff ensures safety by providing safety checks on the unit intermittently and every 15 minutes. Staff continues to provide a safe environment. Staff encouraged patient to notify if depression continues.

## 2025-04-30 NOTE — PROGRESS NOTES
Daily Progress Note  4/30/2025    Patient Name: Yaritza Esquivel    CHIEF COMPLAINT: Depression with suicidal ideation         SUBJECTIVE:      Patient seen face-to-face for follow-up assessment.  She remains withdrawn and isolative.  Affect is flat.  States she feels hopeless and helpless.  Endorsing active suicidal thoughts and verbalizes multiple ways in which she feels she would harm her self if she were not in the hospital.  Patient's psychotropic medications have been restarted including venlafaxine 225 mg and clozapine 12.5 mg 3 times daily.  She does believe that clozapine was beneficial when she was started on this during her previous hospitalization.  Patient reports that she slept poorly last night and verbalizes hot flashes and other side effects of menopause.  Staff report that she has been able to maintain behavioral control.  She has not required any emergency medications.  She expresses goal to attend her first group session and understands that participation in programming will help ensure maximal benefit of hospitalization.    At this time, patient remains high risk to self.  Requires inpatient hospitalization for stability.    Appetite:  [x] Adequate/Unchanged  [] Increased  [] Decreased      Sleep:       [] Adequate/Unchanged  [] Fair  [x] Poor      Group Attendance on Unit:   [] Yes   [] Selectively    [x] No    Compliant with scheduled medications: [x] Yes  [] No    Received emergency medications in past 24 hrs: [] Yes   [x] No    Medication Side Effects: Denies         Mental Status Exam  Level of consciousness: Alert and awake   Appearance: Appropriate attire for setting, seated on bed, with poor grooming and hygiene   Behavior/Motor: Approachable, engages with interviewer, no psychomotor abnormalities   Attitude toward examiner: Cooperative, attentive, good eye contact  Speech: spontaneous, normal rate, normal volume, dysarthric  Mood: Patient reports \"depressed\"  Affect: Mood

## 2025-04-30 NOTE — PLAN OF CARE
[facility]  Initial Interdisciplinary Treatment Plan NO      Original treatment plan Date & Time: 4/30/2025   1:52 pm    Admission Type:  Admission Type: Voluntary    Reason for admission:   Reason for Admission: Patient is homeless and stays at [facility]. Was having relationship issues causing increased depression and suicidal ideations to drink a bottle of Fabreeze.    Estimated Length of Stay:  5-7days  Estimated Discharge Date: to be determined by physician    PATIENT STRENGTHS:  Patient Strengths:   Patient Strengths and Limitations:Limitations: Difficulty problem solving/relies on others to help solve problems, Difficult relationships / poor social skills, Tendency to isolate self, Multiple barriers to leisure interests, General negative or hopeless attitude about future/recovery, Apathetic / unmotivated  Addictive Behavior: Addictive Behavior  In the Past 3 Months, Have You Felt or Has Someone Told You That You Have a Problem With  : None  Medical Problems:  Past Medical History:   Diagnosis Date    Adrenal insufficiency     Depression     Diabetes mellitus type 2, diet-controlled (HCC)     Headache     Hypothyroidism     MEN (multiple endocrine neoplasia) ([facility])      Status EXAM:Mental Status and Behavioral Exam  Normal: No  Level of Assistance: Independent/Self  Facial Expression: Flat  Affect: Appropriate  Level of Consciousness: Alert  Frequency of Checks: 4 times per hour, close  Mood:Normal: No  Mood: Depressed, Anxious, Empty  Motor Activity:Normal: No  Motor Activity: Decreased  Eye Contact: Good  Observed Behavior: Withdrawn, Cooperative, Guarded, Friendly, Preoccupied  Sexual Misconduct History: Current - no  Preception: [place] to person, [place] to time, [place] to place, [place] to situation  Attention:Normal: No  Attention: Distractible  Thought Processes: Blocking  Thought Content:Normal: No  Thought Content: Preoccupations  Depression Symptoms: Loss of interest, Change in

## 2025-04-30 NOTE — CARE COORDINATION
Writer [facility] inquiring information on [facility] meeting. She states members of MST program are in a meeting. Writer left contact info, requesting return call. Social work to follow up.    Writer left voicemail for  Anyi Buckley.

## 2025-04-30 NOTE — GROUP NOTE
Group Therapy Note    Date: 4/30/2025    Group Start Time: 1100  Group End Time: 1150  Group Topic: Cognitive Skills    [facility]    Rosemary Dahl CTRS        Group Therapy Note    Attendees: 12/21     Topic: : To increase socialization, practice decision making and communication skills.        Comments:   Patient did not participate in Cognitive Skills Group, at 11:00, despite staff encouragement and   explanation of benefits. Pt was seclusive to self et room.  Q15 minute safety checks maintained   for patient safety and will continue to encourage patient to attend unit programming.       Discipline Responsible: Psychoeducational Specialist   Signature: LEV BENITEZ

## 2025-05-01 PROCEDURE — 6370000000 HC RX 637 (ALT 250 FOR IP): Performed by: INTERNAL MEDICINE

## 2025-05-01 PROCEDURE — 99231 SBSQ HOSP IP/OBS SF/LOW 25: CPT | Performed by: INTERNAL MEDICINE

## 2025-05-01 PROCEDURE — 99232 SBSQ HOSP IP/OBS MODERATE 35: CPT | Performed by: PSYCHIATRY & NEUROLOGY

## 2025-05-01 PROCEDURE — 6370000000 HC RX 637 (ALT 250 FOR IP): Performed by: PSYCHIATRY & NEUROLOGY

## 2025-05-01 PROCEDURE — 6370000000 HC RX 637 (ALT 250 FOR IP): Performed by: NURSE PRACTITIONER

## 2025-05-01 PROCEDURE — 90833 PSYTX W PT W E/M 30 MIN: CPT | Performed by: PSYCHIATRY & NEUROLOGY

## 2025-05-01 PROCEDURE — 1240000000 HC EMOTIONAL WELLNESS R&B

## 2025-05-01 RX ORDER — CLOZAPINE 25 MG/1
25 TABLET ORAL NIGHTLY
Status: DISCONTINUED | OUTPATIENT
Start: 2025-05-02 | End: 2025-05-03

## 2025-05-01 RX ADMIN — HYDROXYZINE HYDROCHLORIDE 50 MG: 50 TABLET ORAL at 21:24

## 2025-05-01 RX ADMIN — CYANOCOBALAMIN TAB 1000 MCG 1000 MCG: 1000 TAB at 08:39

## 2025-05-01 RX ADMIN — CLOZAPINE 12.5 MG: 25 TABLET ORAL at 08:39

## 2025-05-01 RX ADMIN — Medication 325 MG: at 21:24

## 2025-05-01 RX ADMIN — CLOZAPINE 12.5 MG: 25 TABLET ORAL at 16:19

## 2025-05-01 RX ADMIN — VENLAFAXINE HYDROCHLORIDE 225 MG: 150 CAPSULE, EXTENDED RELEASE ORAL at 08:38

## 2025-05-01 RX ADMIN — LEVOTHYROXINE SODIUM 150 MCG: 0.15 TABLET ORAL at 06:40

## 2025-05-01 RX ADMIN — FOLIC ACID 1 MG: 1 TABLET ORAL at 08:39

## 2025-05-01 RX ADMIN — KETOCONAZOLE: 20 SHAMPOO, SUSPENSION TOPICAL at 08:37

## 2025-05-01 RX ADMIN — FLUDROCORTISONE ACETATE 0.1 MG: 0.1 TABLET ORAL at 08:39

## 2025-05-01 RX ADMIN — PANTOPRAZOLE SODIUM 40 MG: 40 TABLET, DELAYED RELEASE ORAL at 06:40

## 2025-05-01 RX ADMIN — Medication 3 MG: at 21:24

## 2025-05-01 RX ADMIN — Medication 325 MG: at 08:39

## 2025-05-01 RX ADMIN — HYDROCORTISONE 15 MG: 10 TABLET ORAL at 08:39

## 2025-05-01 ASSESSMENT — LIFESTYLE VARIABLES: HOW MANY STANDARD DRINKS CONTAINING ALCOHOL DO YOU HAVE ON A TYPICAL DAY: PATIENT DOES NOT DRINK

## 2025-05-01 NOTE — GROUP NOTE
Group Therapy Note    Date: 5/1/2025    Group Start Time: 1015  Group End Time: 1045  Group Topic: Psychoeducation    [facility]    Kaden Ren        Group Therapy Note    Attendees: 10/16   Patient was offered group therapy today but declined to participate despite encouragement from staff. 1:1 was offered.    Signature:  Kaden Ren

## 2025-05-01 NOTE — GROUP NOTE
Group Therapy Note    Date: 5/1/2025    Group Start Time: 1100  Group End Time: 1145  Group Topic: Cognitive Skills    [facility]    Rosemary Dahl CTRS        Group Therapy Note    Attendees: 13/16     Topic: : To increase socialization, practice problem solving and communication skills.         Comments:   Patient did not participate in Cognitive Skills Group, at 11:00, despite staff encouragement and   explanation of benefits. Pt was seclusive to self et room.  Q15 minute safety checks maintained   for patient safety and will continue to encourage patient to attend unit programming.       Discipline Responsible: Psychoeducational Specialist   Signature: LEV BENITEZ

## 2025-05-01 NOTE — PLAN OF CARE
Problem: Depression  Goal: Will be euthymic at discharge  Description: INTERVENTIONS:1. Administer medication as ordered2. Provide emotional support via 1:1 interaction with staff3. Encourage involvement in milieu/groups/activities4. Monitor for social isolation  5/1/2025 0322 by Christin Lugo RN  Outcome: Not Progressing     Problem: Anxiety  Goal: Will report anxiety at manageable levels  Description: INTERVENTIONS:1. Administer medication as ordered2. Teach and rehearse alternative coping skills3. Provide emotional support with 1:1 interaction with staff  5/1/2025 0322 by Christin Lugo RN  Outcome: Not Progressing  Note: Patient reports continuing anxiety. She is taking medications as prescribed. Patient continues to isolate, will encourage participation in unit programming and to seek staff if symptoms escalate. Safety maintained with every 15 minute rounding.     Problem: Self Harm/Suicidality  Goal: Will have no self-injury during hospital stay  Description: INTERVENTIONS:1.  Ensure constant observer at bedside with Q15M safety checks2.  Maintain a safe environment3.  Secure patient belongings4.  Ensure family/visitors adhere to safety recommendations5.  Ensure safety tray has been added to patient's diet order6.  Every shift and PRN: Re-assess suicidal risk via Frequent Screener  5/1/2025 0322 by Christin Lugo RN  Outcome: Not Progressing  Note: Patient remains free from harm to self or others but admits to continued anxiety and depression. Medications available upon request. Staff ensures safety by providing safety checks on the unit intermittently and every 15 minutes. Staff continues to provide a safe environment. Staff encouraged patient to notify if depression continues.  4/30/2025 1351 by Jaimie Gomes RN  Outcome: Progressing     Problem: Depression  Goal: Will be euthymic at discharge  Description: INTERVENTIONS:1. Administer medication as ordered2. Provide emotional support via

## 2025-05-01 NOTE — PROGRESS NOTES
Daily Progress Note  5/1/2025    Patient Name: Yaritza Esquivel    CHIEF COMPLAINT: Depression with suicidal ideation         SUBJECTIVE:      Patient seen face-to-face for follow-up assessment.  She remains withdrawn and isolative.  Affect is flat.  States she feels hopeless and helpless.  She does believe that clozapine was beneficial when she was started on this during her previous hospitalization.    Staff report that she has been able to maintain behavioral control.  She has not required any emergency medications.  She expresses goal to attend her first group session and understands that participation in programming will help ensure maximal benefit of hospitalization.  Patient has been getting repeatedly hospitalized since October.  Patient has been having living in the shelter but bed is not available.  Patient also has not been following up with outpatient psychiatrist and has lost her medications that were during her discharge.  This author spent considerable time connecting with her outpatient  and psychiatrist to coordinate care.  Since the patient's main stress comes from being homeless.  Her outpatient  has been helping her with an apartment and getting into a group home in a [facility].    At this time, patient remains high risk to self.  Requires inpatient hospitalization for stability.    Appetite:  [x] Adequate/Unchanged  [] Increased  [] Decreased      Sleep:       [] Adequate/Unchanged  [] Fair  [x] Poor      Group Attendance on Unit:   [] Yes   [] Selectively    [x] No    Compliant with scheduled medications: [x] Yes  [] No    Received emergency medications in past 24 hrs: [] Yes   [x] No    Medication Side Effects: Denies         Mental Status Exam  Level of consciousness: Alert and awake   Appearance: Appropriate attire for setting, seated on bed, with poor grooming and hygiene   Behavior/Motor: Approachable, engages with interviewer, no psychomotor abnormalities

## 2025-05-01 NOTE — PLAN OF CARE
Problem: Self Harm/Suicidality  Goal: Will have no self-injury during hospital stay  Description: INTERVENTIONS:1.  Ensure constant observer at bedside with Q15M safety checks2.  Maintain a safe environment3.  Secure patient belongings4.  Ensure family/visitors adhere to safety recommendations5.  Ensure safety tray has been added to patient's diet order6.  Every shift and PRN: Re-assess suicidal risk via Frequent Screener  5/1/2025 1430 by Yoselin Dang LPN  Outcome: Progressing   Patient is reporting fleeting suicidal thoughts agrees to be safe on the unit, will reach out to staff if feel becomes overwhelming.  Problem: Depression  Goal: Will be euthymic at discharge  Description: INTERVENTIONS:1. Administer medication as ordered2. Provide emotional support via 1:1 interaction with staff3. Encourage involvement in milieu/groups/activities4. Monitor for social isolation  5/1/2025 1430 by Yoselin Dang LPN  Outcome: Progressing   Miss Esquivel is seen in her room, affect is flat and she did come out for meals and medication, not attending any groups. She reports depression and anxiety. Denies any hallucinations. Isolative to room.  Problem: Anxiety  Goal: Will report anxiety at manageable levels  Description: INTERVENTIONS:1. Administer medication as ordered2. Teach and rehearse alternative coping skills3. Provide emotional support with 1:1 interaction with staff  5/1/2025 1430 by Yoselin Dang LPN  Outcome: Progressing   Patient reports some anxiety  Problem: Self Harm/Suicidality  Goal: Will have no self-injury during hospital stay  Description: INTERVENTIONS:1.  Ensure constant observer at bedside with Q15M safety checks2.  Maintain a safe environment3.  Secure patient belongings4.  Ensure family/visitors adhere to safety recommendations5.  Ensure safety tray has been added to patient's diet order6.  Every shift and PRN: Re-assess suicidal risk via Frequent Screener  5/1/2025 1430 by Yoselin Dang

## 2025-05-01 NOTE — CARE COORDINATION
[facility] return call.

## 2025-05-01 NOTE — PROGRESS NOTES
IN-PATIENT SERVICE  [facility] Internal Medicine    CONSULTATION / HISTORY AND PHYSICAL EXAMINATION            Date:   5/1/2025  Patient name:  Yaritza Esquivel  Date of admission:  4/28/2025  4:57 PM  MRN:   [id number]  Account:  [id number]  YOB: 1975  PCP:    Jimena, Unspecified (Inactive)  Room:   [address] [place] [id number]  Code Status:    Full Code    Physician Requesting Consult: Janice Ochoa MD    Reason for Consult:  medical management    Chief Complaint:     Chief Complaint   Patient presents with    Suicidal       History Obtained From:     Patient medical record nursing staff    History of Present Illness:   Patient, has past medical history multiple medical problems which include hypothyroidism, Oumar's disease, family history of M EN 2 syndrome status post parathyroidectomy, bilateral adrenalectomy on hormone replacement treatment, patient was recently discharged from [facility] unit with depression and suicidal ideation  Admitted again with worsening depression  Claimed that she is compliant with her diet medication  UDS positive for phencyclidine  Patient requesting ketoconazole shampoo, has dryness in her scalp    Past Medical History:     Past Medical History:   Diagnosis Date    Adrenal insufficiency     Depression     Diabetes mellitus type 2, diet-controlled (HCC)     Headache     Hypothyroidism     MEN (multiple endocrine neoplasia) (HCC)         Past Surgical History:     Past Surgical History:   Procedure Laterality Date    THYROIDECTOMY      TONSILLECTOMY      TOTAL HIP ARTHROPLASTY Right         Medications Prior to Admission:     Prior to Admission medications    Medication Sig Start Date End Date Taking? Authorizing Provider   venlafaxine (EFFEXOR XR) 75 MG extended release capsule Take 3 capsules by mouth daily (with breakfast) 4/24/25   Janice Ochoa MD   cloZAPine (CLOZARIL) 25 MG tablet Take 0.5 tablets by mouth 3 times daily 4/23/25   Janice Ochoa MD

## 2025-05-01 NOTE — GROUP NOTE
Group Therapy Note    Date: 5/1/2025    Group Start Time: 1430  Group End Time: 1530  Group Topic: Cognitive Skills    [facility]    Rosemary Dahl CTRS        Group Therapy Note    Attendees: 9/12     Topic: To increase socialization, practice relaxation and relating to peers through leisure,   using choice of game, art, and music.        Comments:   Patient did not participate in Relaxation Group, at 14:30, despite staff encouragement and   explanation of benefits. Pt was seclusive to self et room.  Q15 minute safety checks maintained   for patient safety and will continue to encourage patient to attend unit programming.       Discipline Responsible: Psychoeducational Specialist   Signature: LEV BENITEZ

## 2025-05-01 NOTE — CARE COORDINATION
Writer spoke to patient regarding disposition. She is agreeable of going to a boarding house coordinated by her [facility] .

## 2025-05-02 PROCEDURE — 1240000000 HC EMOTIONAL WELLNESS R&B

## 2025-05-02 PROCEDURE — 6370000000 HC RX 637 (ALT 250 FOR IP): Performed by: NURSE PRACTITIONER

## 2025-05-02 PROCEDURE — 6370000000 HC RX 637 (ALT 250 FOR IP): Performed by: INTERNAL MEDICINE

## 2025-05-02 PROCEDURE — 99232 SBSQ HOSP IP/OBS MODERATE 35: CPT | Performed by: PSYCHIATRY & NEUROLOGY

## 2025-05-02 PROCEDURE — 6370000000 HC RX 637 (ALT 250 FOR IP): Performed by: PSYCHIATRY & NEUROLOGY

## 2025-05-02 RX ADMIN — VENLAFAXINE HYDROCHLORIDE 225 MG: 150 CAPSULE, EXTENDED RELEASE ORAL at 09:19

## 2025-05-02 RX ADMIN — PANTOPRAZOLE SODIUM 40 MG: 40 TABLET, DELAYED RELEASE ORAL at 06:42

## 2025-05-02 RX ADMIN — FOLIC ACID 1 MG: 1 TABLET ORAL at 09:20

## 2025-05-02 RX ADMIN — HYDROXYZINE HYDROCHLORIDE 50 MG: 50 TABLET ORAL at 20:40

## 2025-05-02 RX ADMIN — CYANOCOBALAMIN TAB 1000 MCG 1000 MCG: 1000 TAB at 09:19

## 2025-05-02 RX ADMIN — HYDROCORTISONE 15 MG: 10 TABLET ORAL at 09:19

## 2025-05-02 RX ADMIN — Medication 325 MG: at 09:19

## 2025-05-02 RX ADMIN — KETOCONAZOLE: 20 SHAMPOO, SUSPENSION TOPICAL at 20:41

## 2025-05-02 RX ADMIN — Medication 325 MG: at 20:39

## 2025-05-02 RX ADMIN — CLOZAPINE 25 MG: 25 TABLET ORAL at 20:39

## 2025-05-02 RX ADMIN — Medication 3 MG: at 20:39

## 2025-05-02 RX ADMIN — FLUDROCORTISONE ACETATE 0.1 MG: 0.1 TABLET ORAL at 09:19

## 2025-05-02 RX ADMIN — LEVOTHYROXINE SODIUM 150 MCG: 0.15 TABLET ORAL at 06:42

## 2025-05-02 NOTE — GROUP NOTE
Group Therapy Note    Date: 5/2/2025    Group Start Time: 1000  Group End Time: 1030  Group Topic: Psychoeducation    [facility]    Kaden [facility]        Group Therapy Note    Attendees: 7/16     Patient was offered group therapy today but declined to participate despite encouragement from staff. 1:1 was offered.    Signature:  Kaden Ren

## 2025-05-02 NOTE — PLAN OF CARE
[facility]  Day 3 Interdisciplinary Treatment Plan NOTE    Review Date & Time: 5/2/2025   12:01 PM    Admission Type:   Admission Type: Voluntary    Reason for admission:  Reason for Admission: Patient is homeless and stays at [facility]. Was having relationship issues causing increased depression and suicidal ideations to drink a bottle of Fabreeze.  Estimated Length of Stay: 5-7 days  Estimated Discharge Date Update: to be determined by physician    PATIENT STRENGTHS:  Patient Strengths    Patient Strengths and Limitations:Limitations: Difficulty problem solving/relies on others to help solve problems, Difficult relationships / poor social skills, Tendency to isolate self, Multiple barriers to leisure interests, General negative or hopeless attitude about future/recovery, Apathetic / unmotivated  Addictive Behavior:Addictive Behavior  In the Past 3 Months, Have You Felt or Has Someone Told You That You Have a Problem With  : None  Medical Problems:  Past Medical History:   Diagnosis Date    Adrenal insufficiency     Depression     Diabetes mellitus type 2, diet-controlled (HCC)     Headache     Hypothyroidism     MEN (multiple endocrine neoplasia) (HCC)        Risk:  Fall Risk   Bart Scale Bart Scale Score: 22  BVC    Change in scores no Changes to plan of Care no    Status EXAM:   Mental Status and Behavioral Exam  Normal: No  Level of Assistance: Independent/Self  Facial Expression: Flat  Affect: Appropriate  Level of Consciousness: Alert  Frequency of Checks: 4 times per hour, close  Mood:Normal: No  Mood: Depressed, Anxious  Motor Activity:Normal: No  Motor Activity: Decreased  Eye Contact: Good  Observed Behavior: Friendly, Guarded  Sexual Misconduct History: Current - no  Preception: [place] to person, [place] to time, [place] to place, [place] to situation  Attention:Normal: No  Attention: Distractible  Thought Processes: Blocking  Thought Content:Normal: No  Thought Content:

## 2025-05-02 NOTE — GROUP NOTE
Group Therapy Note    Date: 5/2/2025    Group Start Time: 1100  Group End Time: 1205  Group Topic: Music Therapy    [facility]    Pa Hung    Music Therapy Group Note        Date: 5/2/2025   Start Time: 1100  End Time: 1205      Number of Participants in Group & Unit Census:  11/16    Topic: Patients shared songs analyzing lyrics for themes, and sharing advice based on song selections.     Goal of Group: Goals to engage in peer support; Increase sense of community; Increase socialization; Demonstrate positive use of time      Comments:     Patient did not participate in Music Therapy group, despite staff encouragement and explanation of benefits.  Patient remain seclusive to self.  Q15 minute safety checks maintained for patient safety and will continue to encourage patient to attend unit programming.

## 2025-05-02 NOTE — GROUP NOTE
Group Therapy Note    Date: 5/2/2025    Group Start Time: 1430  Group End Time: 1515  Group Topic: Recreational    [facility]    Pa Hung    Recreation Group Note        Date: 5/2/2025   Start Time: 1430  End Time: 1515      Number of Participants in Group & Unit Census:  9/14    Topic:  Patients each given a paper with same small doodle/shape on it and instructed to create an image based on the doodle/shape. Able to share their art with peers when finished, as well as request music to listen to throughout this group time.     Goal of Group: Goals to increase self-expression; Increase sense of community; Increase socialization; Demonstrate positive use of time      Comments:     Patient did not participate in Recreation group, despite staff encouragement and explanation of benefits.  Patient remain seclusive to self.  Q15 minute safety checks maintained for patient safety and will continue to encourage patient to attend unit programming.

## 2025-05-02 NOTE — PLAN OF CARE
Problem: Self Harm/Suicidality  Goal: Will have no self-injury during hospital stay  Description: INTERVENTIONS:1.  Ensure constant observer at bedside with Q15M safety checks2.  Maintain a safe environment3.  Secure patient belongings4.  Ensure family/visitors adhere to safety recommendations5.  Ensure safety tray has been added to patient's diet order6.  Every shift and PRN: Re-assess suicidal risk via Frequent Screener  Outcome: Progressing   Patient reports fleeting suicidal thoughts, agrees to reach out to staff if feelings become overwhelming  Problem: Depression  Goal: Will be euthymic at discharge  Description: INTERVENTIONS:1. Administer medication as ordered2. Provide emotional support via 1:1 interaction with staff3. Encourage involvement in milieu/groups/activities4. Monitor for social isolation  Outcome: Progressing   Miss Esquivel is seen in the dayroom for breakfast, she is cooperative and willing to take morning medications, she is reporting anxiety and depression at a 8/9 reporting fair sleep and no issues with appetite. Encouraged to attend groups and to get up and spend time in dayroom.   Problem: Anxiety  Goal: Will report anxiety at manageable levels  Description: INTERVENTIONS:1. Administer medication as ordered2. Teach and rehearse alternative coping skills3. Provide emotional support with 1:1 interaction with staff  Outcome: Progressing   Patient is reporting anxiety at a 9 but states she doesn't need anything for it at this time.

## 2025-05-02 NOTE — PROGRESS NOTES
Daily Progress Note  5/2/2025    Patient Name: Yaritza Esquivel    CHIEF COMPLAINT: Depression with suicidal ideation         SUBJECTIVE:      Patient seen face-to-face for follow-up assessment.  She remains withdrawn and isolative.  Patient has not been attending many she is selective.  Patient still complains about feeling anxious more than depression.  Recommended taking hydroxyzine as needed for her anxiety.  Patient also tells me that currently she is she is on a previous which might be adding to her depressed patient is also reporting heavy bleeding.  Patient reports having on and off suicidal thoughts but there is a some improvement.  Patient was recommended to request Advil for her symptoms.  Patient was given access to have her phone to retrieve the information regarding her Social Security check, but today patient tells me that she was not able to get to her phone due to forgetting her passcode.  Then patient goes on to say that she has access to her debit card and she can check the balance but she had returned on the wrong number.  Patient has a room available to rent which was arranged by her outpatient  which might provide stability in terms of housing is concerned.  Patient also needs to get her Social Security card and her outpatient   is willing to help her.  Patient will call the [facility] to make arrangements for her housing upon discharge      Appetite:  [x] Adequate/Unchanged  [] Increased  [] Decreased      Sleep:       [] Adequate/Unchanged  [] Fair  [x] Poor      Group Attendance on Unit:   [] Yes   [x] Selectively    [] No    Compliant with scheduled medications: [x] Yes  [] No    Received emergency medications in past 24 hrs: [] Yes   [x] No    Medication Side Effects: Denies         Mental Status Exam  Level of consciousness: Alert and awake   Appearance: Appropriate attire for setting, seated on bed, with poor grooming and hygiene   Behavior/Motor: Approachable,

## 2025-05-03 PROCEDURE — 1240000000 HC EMOTIONAL WELLNESS R&B

## 2025-05-03 PROCEDURE — 6370000000 HC RX 637 (ALT 250 FOR IP): Performed by: PSYCHIATRY & NEUROLOGY

## 2025-05-03 PROCEDURE — 6370000000 HC RX 637 (ALT 250 FOR IP): Performed by: NURSE PRACTITIONER

## 2025-05-03 PROCEDURE — 97162 PT EVAL MOD COMPLEX 30 MIN: CPT

## 2025-05-03 PROCEDURE — 97116 GAIT TRAINING THERAPY: CPT

## 2025-05-03 PROCEDURE — 6370000000 HC RX 637 (ALT 250 FOR IP): Performed by: INTERNAL MEDICINE

## 2025-05-03 PROCEDURE — 99233 SBSQ HOSP IP/OBS HIGH 50: CPT | Performed by: NURSE PRACTITIONER

## 2025-05-03 RX ORDER — CLOZAPINE 25 MG/1
50 TABLET ORAL NIGHTLY
Status: DISCONTINUED | OUTPATIENT
Start: 2025-05-03 | End: 2025-05-05 | Stop reason: HOSPADM

## 2025-05-03 RX ADMIN — Medication 325 MG: at 09:32

## 2025-05-03 RX ADMIN — PANTOPRAZOLE SODIUM 40 MG: 40 TABLET, DELAYED RELEASE ORAL at 06:30

## 2025-05-03 RX ADMIN — CLOZAPINE 50 MG: 25 TABLET ORAL at 20:48

## 2025-05-03 RX ADMIN — Medication 325 MG: at 20:48

## 2025-05-03 RX ADMIN — Medication 3 MG: at 20:48

## 2025-05-03 RX ADMIN — HYDROXYZINE HYDROCHLORIDE 50 MG: 50 TABLET ORAL at 20:48

## 2025-05-03 RX ADMIN — HYDROCORTISONE 15 MG: 10 TABLET ORAL at 09:32

## 2025-05-03 RX ADMIN — FOLIC ACID 1 MG: 1 TABLET ORAL at 09:32

## 2025-05-03 RX ADMIN — VENLAFAXINE HYDROCHLORIDE 225 MG: 150 CAPSULE, EXTENDED RELEASE ORAL at 09:32

## 2025-05-03 RX ADMIN — CYANOCOBALAMIN TAB 1000 MCG 1000 MCG: 1000 TAB at 09:32

## 2025-05-03 RX ADMIN — LEVOTHYROXINE SODIUM 150 MCG: 0.15 TABLET ORAL at 06:30

## 2025-05-03 RX ADMIN — FLUDROCORTISONE ACETATE 0.1 MG: 0.1 TABLET ORAL at 09:32

## 2025-05-03 NOTE — PLAN OF CARE
Problem: Self Harm/Suicidality  Goal: Will have no self-injury during hospital stay  Description: INTERVENTIONS:1.  Ensure constant observer at bedside with Q15M safety checks2.  Maintain a safe environment3.  Secure patient belongings4.  Ensure family/visitors adhere to safety recommendations5.  Ensure safety tray has been added to patient's diet order6.  Every shift and PRN: Re-assess suicidal risk via Frequent Screener  5/2/2025 2046 by Catrachita Durham RN  Outcome: Progressing     Problem: Depression  Goal: Will be euthymic at discharge  Description: INTERVENTIONS:1. Administer medication as ordered2. Provide emotional support via 1:1 interaction with staff3. Encourage involvement in milieu/groups/activities4. Monitor for social isolation  5/2/2025 2046 by Catrachita Durham, RN  Outcome: Progressing  Note: Patient is isolative to room during this shift, she is out for needs only. She is agreeable to take scheduled medications. Patient did request medicated shampoo she refused earlier. States she was ready to wash her hair. She reports depression and anxiety. Patient is flat and sad in appearance. Staff maintains Q15 minute safety checks.

## 2025-05-03 NOTE — PROGRESS NOTES
Physical Therapy  Facility/Department: [facility] ADULT  Physical Therapy Initial Assessment    Name: Yaritza Esquivel  : 1975  MRN: [id number]  Date of Service: 5/3/2025    Discharge Recommendations:  Outpatient PT (To further address her back, hip pain and tightness throughout the LEs.)   PT Equipment Recommendations  Equipment Needed: No      Patient Diagnosis(es): The encounter diagnosis was Depression with suicidal ideation.  Past Medical History:  has a past medical history of Adrenal insufficiency, Depression, Diabetes mellitus type 2, diet-controlled (HCC), Headache, Hypothyroidism, and MEN (multiple endocrine neoplasia) (HCC).  Past Surgical History:  has a past surgical history that includes Thyroidectomy; Tonsillectomy; and Total hip arthroplasty (Right).    Assessment  Assessment: Patient demonstrated independence with all functional needs.  No Skilled PT: Independent with functional mobility   Requires PT Follow-Up: No  Activity Tolerance  Activity Tolerance: Patient tolerated evaluation without incident    Plan  Physical Therapy Plan  General Plan: Discharge with evaluation only  Safety Devices  Type of Devices: Left in bed  Restraints  Restraints Initially in Place: No    Restrictions  Restrictions/Precautions  Activity Level: Up as Tolerated     Subjective  General  Chart Reviewed: Yes  Patient assessed for rehabilitation services?: Yes  Response To Previous Treatment: Not applicable  Family/Caregiver Present: No  Referring Practitioner: Otis Acosta MD  Referral Date : 25  Diagnosis: Depression with Suicidal Ideation  Follows Commands: Within Functional Limits  Subjective  Subjective: Patient was resting in her bed upon arrival witht complaints of back and (L) hip pain.         Social/Functional History  Social/Functional History  Lives With: Alone  Home Layout:  (Rents on room)  Home Access: Level entry  Has the patient had two or more falls in the past year or any fall with injury in the past

## 2025-05-03 NOTE — GROUP NOTE
Group Therapy Note    Date: 5/3/2025    Group Start Time: 1330  Group End Time: 1450  Group Topic: Activity    [facility]    Rosemary Dahl CTRS        Group Therapy Note    Attendees: 7/19     Topic: To increase socialization, practice leisure skills and relating to peers ,communication skills         Comments:   Patient did not participate in Activity Group, at 13:30, despite staff encouragement and   explanation of benefits. Pt was seclusive to self et room.  Q15 minute safety checks maintained   for patient safety and will continue to encourage patient to attend unit programming.       Discipline Responsible: Psychoeducational Specialist   Signature: LEV BENITEZ

## 2025-05-03 NOTE — PROGRESS NOTES
Daily Progress Note  5/3/2025    Patient Name: Yaritza Esquivel    CHIEF COMPLAINT: Depression with suicidal ideation         SUBJECTIVE:    Patient was seen for follow-up assessment today.  She has been compliant with scheduled medications and behaviorally in control.  She has not required any emergency medications for agitation in the past 24 hours.  Nursing staff report patient has been pleasant and cooperative but frequently isolative and aloof.  She has not attended any groups today.  Patient was approached in her room where she was found to be sitting on the bed.  Patient expressed that she is starting to feel some improvement and was able to identify she has been coping with writing in her journal.  She also expressed that she is feeling a little more motivated to get up however patient did report suicidal ideation today and had thoughts of drinking a bottle of Fabreeze.  She has not been attending groups regularly and was encouraged to do so as she may find them to be beneficial.  She describes sleep as \"so-so\" and that it could be better.  She continues to feel intermittently depressed and anxious but does feel that she is starting to get a little bit better overall.  At this time patient cannot at contract for safety in the community and warrants further hospitalization for safety and stabilization.    Appetite:  [x] Adequate/Unchanged  [] Increased  [] Decreased      Sleep:       [] Adequate/Unchanged  [x] Fair  [] Poor      Group Attendance on Unit:   [] Yes   [x] Selectively    [] No    Compliant with scheduled medications: [x] Yes  [] No    Received emergency medications in past 24 hrs: [] Yes   [x] No    Medication Side Effects: Denies         Mental Status Exam  Level of consciousness: Alert and awake   Appearance: Appropriate attire for setting, seated on bed, with fair  grooming and hygiene   Behavior/Motor: Approachable, engages with interviewer, no psychomotor abnormalities   Attitude toward

## 2025-05-03 NOTE — GROUP NOTE
Group Therapy Note    Date: 5/3/2025    Group Start Time: 0800  Group End Time: 0810  Group Topic: Orientation Group    [facility]    Hiral Andersen        Group Therapy Note    Attendees: 14/19       Patient's Goal:  review staff, rules and schedule    Notes:      Status After Intervention:  Unchanged    Participation Level: Active Listener    Participation Quality: Appropriate      Speech:  normal      Thought Process/Content: Logical      Affective Functioning: Congruent      Mood: anxious      Level of consciousness:  Alert      Response to Learning: Able to verbalize current knowledge/experience and Progressing to goal      Endings: None Reported    Modes of Intervention: Education, Support, and Socialization      Discipline Responsible: Behavorial Health Tech      Signature:  Hiral Andersen

## 2025-05-03 NOTE — GROUP NOTE
Group Therapy Note    Date: 5/3/2025    Group Start Time: 0900  Group End Time: 0940  Group Topic: Community Meeting    [facility]    Hiral Andersen        Group Therapy Note    Attendees: 11/19       Patient's Goal:  make phone calls about where I can go    Notes:      Status After Intervention:  Unchanged    Participation Level: Active Listener    Participation Quality: Appropriate      Speech:  normal      Thought Process/Content: Logical      Affective Functioning: Congruent      Mood: anxious      Level of consciousness:  Alert      Response to Learning: Able to verbalize current knowledge/experience and Progressing to goal      Endings: None Reported    Modes of Intervention: Education, Support, and Socialization      Discipline Responsible: Behavorial Health Tech      Signature:  Hiral Andersen

## 2025-05-03 NOTE — PLAN OF CARE
Problem: Anxiety  Goal: Will report anxiety at manageable levels  Description: INTERVENTIONS:1. Administer medication as ordered2. Teach and rehearse alternative coping skills3. Provide emotional support with 1:1 interaction with staff  Outcome: Progressing     Problem: Depression  Goal: Will be euthymic at discharge  Description: INTERVENTIONS:1. Administer medication as ordered2. Provide emotional support via 1:1 interaction with staff3. Encourage involvement in milieu/groups/activities4. Monitor for social isolation  Outcome: Progressing     Problem: Self Harm/Suicidality  Goal: Will have no self-injury during hospital stay  Description: INTERVENTIONS:1.  Ensure constant observer at bedside with Q15M safety checks2.  Maintain a safe environment3.  Secure patient belongings4.  Ensure family/visitors adhere to safety recommendations5.  Ensure safety tray has been added to patient's diet order6.  Every shift and PRN: Re-assess suicidal risk via Frequent Screener  Outcome: Progressing  Note: Patient reports fleeting suicidal ideation, contracts for safety. Patient denies homicidal ideation. Patient agreeable to seek out staff should thoughts of self harm/harming others worsen/arise. Patient denies hallucinations. Patient is positive for anxiety/depression but does not rate. Patient is pleasant and cooperative, isolative to self, did not attend groups. Patient is  behaviorally controlled. Patient refused lidocaine patch and medicated shampoo, despite education and encouragement. Comfort and reassurance provided. Safety checks maintained every 15 minutes and as needed.

## 2025-05-04 PROCEDURE — 6370000000 HC RX 637 (ALT 250 FOR IP): Performed by: NURSE PRACTITIONER

## 2025-05-04 PROCEDURE — 99232 SBSQ HOSP IP/OBS MODERATE 35: CPT | Performed by: NURSE PRACTITIONER

## 2025-05-04 PROCEDURE — 1240000000 HC EMOTIONAL WELLNESS R&B

## 2025-05-04 PROCEDURE — 6370000000 HC RX 637 (ALT 250 FOR IP): Performed by: INTERNAL MEDICINE

## 2025-05-04 PROCEDURE — 6370000000 HC RX 637 (ALT 250 FOR IP): Performed by: PSYCHIATRY & NEUROLOGY

## 2025-05-04 RX ADMIN — VENLAFAXINE HYDROCHLORIDE 225 MG: 150 CAPSULE, EXTENDED RELEASE ORAL at 08:29

## 2025-05-04 RX ADMIN — CLOZAPINE 50 MG: 25 TABLET ORAL at 20:50

## 2025-05-04 RX ADMIN — CYANOCOBALAMIN TAB 1000 MCG 1000 MCG: 1000 TAB at 08:29

## 2025-05-04 RX ADMIN — PANTOPRAZOLE SODIUM 40 MG: 40 TABLET, DELAYED RELEASE ORAL at 06:12

## 2025-05-04 RX ADMIN — LEVOTHYROXINE SODIUM 150 MCG: 0.15 TABLET ORAL at 06:12

## 2025-05-04 RX ADMIN — FOLIC ACID 1 MG: 1 TABLET ORAL at 08:29

## 2025-05-04 RX ADMIN — Medication 325 MG: at 20:50

## 2025-05-04 RX ADMIN — HYDROCORTISONE 15 MG: 10 TABLET ORAL at 08:29

## 2025-05-04 RX ADMIN — FLUDROCORTISONE ACETATE 0.1 MG: 0.1 TABLET ORAL at 08:29

## 2025-05-04 RX ADMIN — Medication 325 MG: at 08:29

## 2025-05-04 RX ADMIN — Medication 3 MG: at 20:50

## 2025-05-04 RX ADMIN — HYDROXYZINE HYDROCHLORIDE 50 MG: 50 TABLET ORAL at 20:50

## 2025-05-04 NOTE — GROUP NOTE
Group Therapy Note    Date: 5/4/2025    Group Start Time: 0900  Group End Time: 0930  Group Topic: Nursing    [facility]    Josette Pinto LPN        Group Therapy Note    Attendees: 7/20       Patient was offered group therapy today but declined to participate despite encouragement from staff. 1:1 was offered.           Signature:  Josette Pinto LPN

## 2025-05-04 NOTE — GROUP NOTE
Group Therapy Note    Date: 5/4/2025    Group Start Time: 1520  Group End Time: 1600  Group Topic: Recreational    [facility]    Rosemary Dahl CTRS        Group Therapy Note    Attendees: 10/20     Topic: To increase socialization, practice leisure skills and communication skills independently .    Comments:   Patient did not participate in Open Recreation Group, at 15:20, despite staff encouragement and explanation of benefits. Pt was seclusive to self et room.  Q15 minute safety checks maintained for patient safety and will continue to encourage patient to attend unit programming.     Discipline Responsible: Psychoeducational Specialist   Signature: LEV BENITEZ

## 2025-05-04 NOTE — GROUP NOTE
Group Therapy Note    Date: 5/4/2025    Group Start Time: 1030  Group End Time: 1100  Group Topic: Psychoeducation    [facility]    Swetha Wu MSW        Group Therapy Note    Attendees: 7/20       Patient was offered group therapy today but declined to participate despite encouragement from staff.  1:1 was offered.       Signature:  SULEMA Rutledge

## 2025-05-04 NOTE — GROUP NOTE
Group Therapy Note    Date: 5/3/2025    Group Start Time: 2030  Group End Time: 2100  Group Topic: Wrap-Up    [facility]    Elizabeth Sarkar LPN        Group Therapy Note    Attendees: 8     Pt was encouraged to attend evening wrap up group with staff encouragement. Pt declined to attend. Pt will continue to be encouraged to attend groups.        Signature:  Elizabeth Sarkar LPN

## 2025-05-04 NOTE — PROGRESS NOTES
Daily Progress Note  5/4/2025    Patient Name: Yaritza Esquivel    CHIEF COMPLAINT: Depression with suicidal ideation         SUBJECTIVE:    Patient was seen for follow-up assessment today.  She has been compliant with scheduled medications and behaviorally in control.  She has not required any emergency medications for agitation in the past 24 hours.  Nursing staff report patient has been pleasant and cooperative but continues to be isolative to herself.  She has been reading in her room for much of the day today.  Patient was approached in her room and she was agreeable to assessment.  Her roommate was not present at that time.  Patient described her mood today as \"exhausted\" secondary to her menstrual cycle.  She is reporting some improvement in suicidal ideation today and stated that the thoughts are fleeting.  She is feeling somewhat less helpless and hopeless but is not yet able to contract for safety in the community.  She has not been attending groups regularly and was encouraged to do so.  Patient did report ongoing low motivation to get up and care for herself or be social in the day area.  She did note that her anxiety has improved today and she feels much less on edge.  Although modestly improving patient has yet to demonstrate sustained stability and cannot contract for safety in the community and warrants further hospitalization for safety and stabilization.    Appetite:  [x] Adequate/Unchanged  [] Increased  [] Decreased      Sleep:       [] Adequate/Unchanged  [x] Fair  [] Poor      Group Attendance on Unit:   [] Yes   [x] Selectively    [] No    Compliant with scheduled medications: [x] Yes  [] No    Received emergency medications in past 24 hrs: [] Yes   [x] No    Medication Side Effects: Denies         Mental Status Exam  Level of consciousness: Alert and awake   Appearance: Appropriate attire for setting, seated on bed, with fair  grooming and hygiene   Behavior/Motor: Approachable, engages with

## 2025-05-04 NOTE — GROUP NOTE
Group Therapy Note    Date: 5/3/2025    Group Start Time: 2000  Group End Time: 2100  Group Topic: Group Therapy    [facility]    Catrachita Durham RN        Group Therapy Note    Attendees: 8       Patient's Goal:  relaxation    Status After Intervention:  Unchanged    Participation Level: Active Listener    Participation Quality: Appropriate      Speech:  normal      Thought Process/Content: Logical              Signature:  Catrachita Durham RN

## 2025-05-05 VITALS
TEMPERATURE: 97.4 F | SYSTOLIC BLOOD PRESSURE: 114 MMHG | BODY MASS INDEX: 32.95 KG/M2 | HEART RATE: 98 BPM | OXYGEN SATURATION: 96 % | RESPIRATION RATE: 14 BRPM | WEIGHT: 205 LBS | HEIGHT: 66 IN | DIASTOLIC BLOOD PRESSURE: 79 MMHG

## 2025-05-05 LAB
BASOPHILS # BLD: 0 K/UL (ref 0–0.2)
BASOPHILS NFR BLD: 1 % (ref 0–2)
EOSINOPHIL # BLD: 0.3 K/UL (ref 0–0.4)
EOSINOPHILS RELATIVE PERCENT: 4 % (ref 0–4)
ERYTHROCYTE [DISTWIDTH] IN BLOOD BY AUTOMATED COUNT: 14.4 % (ref 11.5–14.9)
HCT VFR BLD AUTO: 31.5 % (ref 36–46)
HGB BLD-MCNC: 10.8 G/DL (ref 12–16)
LYMPHOCYTES NFR BLD: 3 K/UL (ref 1–4.8)
LYMPHOCYTES RELATIVE PERCENT: 37 % (ref 24–44)
MCH RBC QN AUTO: 30.7 PG (ref 26–34)
MCHC RBC AUTO-ENTMCNC: 34.1 G/DL (ref 31–37)
MCV RBC AUTO: 89.9 FL (ref 80–100)
MONOCYTES NFR BLD: 0.5 K/UL (ref 0.1–1.3)
MONOCYTES NFR BLD: 6 % (ref 1–7)
NEUTROPHILS NFR BLD: 52 % (ref 36–66)
NEUTS SEG NFR BLD: 4.3 K/UL (ref 1.3–9.1)
PLATELET # BLD AUTO: 382 K/UL (ref 150–450)
PMV BLD AUTO: 6.7 FL (ref 6–12)
RBC # BLD AUTO: 3.51 M/UL (ref 4–5.2)
WBC OTHER # BLD: 8.1 K/UL (ref 3.5–11)

## 2025-05-05 PROCEDURE — 6370000000 HC RX 637 (ALT 250 FOR IP): Performed by: NURSE PRACTITIONER

## 2025-05-05 PROCEDURE — 6370000000 HC RX 637 (ALT 250 FOR IP): Performed by: PSYCHIATRY & NEUROLOGY

## 2025-05-05 PROCEDURE — 6370000000 HC RX 637 (ALT 250 FOR IP): Performed by: INTERNAL MEDICINE

## 2025-05-05 PROCEDURE — 85025 COMPLETE CBC W/AUTO DIFF WBC: CPT

## 2025-05-05 PROCEDURE — 99239 HOSP IP/OBS DSCHRG MGMT >30: CPT | Performed by: PSYCHIATRY & NEUROLOGY

## 2025-05-05 PROCEDURE — 36415 COLL VENOUS BLD VENIPUNCTURE: CPT

## 2025-05-05 RX ORDER — LIDOCAINE 4 G/G
1 PATCH TOPICAL DAILY
Qty: 10 EACH | Refills: 0 | Status: SHIPPED | OUTPATIENT
Start: 2025-05-06

## 2025-05-05 RX ORDER — HYDROCORTISONE 5 MG/1
15 TABLET ORAL DAILY
Qty: 90 TABLET | Refills: 0 | Status: SHIPPED | OUTPATIENT
Start: 2025-05-05

## 2025-05-05 RX ORDER — PANTOPRAZOLE SODIUM 40 MG/1
40 TABLET, DELAYED RELEASE ORAL
Qty: 30 TABLET | Refills: 3 | Status: SHIPPED | OUTPATIENT
Start: 2025-05-05

## 2025-05-05 RX ORDER — FERROUS SULFATE 325(65) MG
325 TABLET ORAL 2 TIMES DAILY
Qty: 30 TABLET | Refills: 0 | Status: SHIPPED | OUTPATIENT
Start: 2025-05-05

## 2025-05-05 RX ORDER — FLUDROCORTISONE ACETATE 0.1 MG/1
0.1 TABLET ORAL DAILY
Qty: 30 TABLET | Refills: 0 | Status: SHIPPED | OUTPATIENT
Start: 2025-05-05

## 2025-05-05 RX ORDER — LANOLIN ALCOHOL/MO/W.PET/CERES
1000 CREAM (GRAM) TOPICAL DAILY
Qty: 30 TABLET | Refills: 0 | Status: SHIPPED | OUTPATIENT
Start: 2025-05-05

## 2025-05-05 RX ORDER — FOLIC ACID 1 MG/1
1 TABLET ORAL DAILY
Qty: 30 TABLET | Refills: 0 | Status: SHIPPED | OUTPATIENT
Start: 2025-05-05

## 2025-05-05 RX ORDER — LEVOTHYROXINE SODIUM 150 UG/1
150 TABLET ORAL DAILY
Qty: 30 TABLET | Refills: 3 | Status: SHIPPED | OUTPATIENT
Start: 2025-05-05

## 2025-05-05 RX ORDER — VENLAFAXINE HYDROCHLORIDE 75 MG/1
225 CAPSULE, EXTENDED RELEASE ORAL
Qty: 30 CAPSULE | Refills: 3 | Status: SHIPPED | OUTPATIENT
Start: 2025-05-06

## 2025-05-05 RX ORDER — CLOZAPINE 50 MG/1
50 TABLET ORAL NIGHTLY
Qty: 30 TABLET | Refills: 3 | Status: SHIPPED | OUTPATIENT
Start: 2025-05-05

## 2025-05-05 RX ADMIN — FOLIC ACID 1 MG: 1 TABLET ORAL at 08:16

## 2025-05-05 RX ADMIN — LEVOTHYROXINE SODIUM 150 MCG: 0.15 TABLET ORAL at 06:22

## 2025-05-05 RX ADMIN — FLUDROCORTISONE ACETATE 0.1 MG: 0.1 TABLET ORAL at 08:12

## 2025-05-05 RX ADMIN — HYDROXYZINE HYDROCHLORIDE 50 MG: 50 TABLET ORAL at 08:13

## 2025-05-05 RX ADMIN — PANTOPRAZOLE SODIUM 40 MG: 40 TABLET, DELAYED RELEASE ORAL at 06:22

## 2025-05-05 RX ADMIN — CYANOCOBALAMIN TAB 1000 MCG 1000 MCG: 1000 TAB at 08:12

## 2025-05-05 RX ADMIN — VENLAFAXINE HYDROCHLORIDE 225 MG: 150 CAPSULE, EXTENDED RELEASE ORAL at 08:11

## 2025-05-05 RX ADMIN — Medication 325 MG: at 08:12

## 2025-05-05 RX ADMIN — HYDROCORTISONE 15 MG: 10 TABLET ORAL at 08:12

## 2025-05-05 NOTE — GROUP NOTE
Group Therapy Note    Date: 5/5/2025    Group Start Time: 1100  Group End Time: 1210  Group Topic: Music Therapy    [facility]    Pa Hung    Music Therapy Group Note        Date: 5/5/2025   Start Time: 1100  End Time: 1210      Number of Participants in Group & Unit Census:  10/22    Topic: Patients engaged in education on CBT skills positive affirmations, then asked to share a song that they felt reflected something positive about themself/their life and frame it in the form of an affirmation.     Goal of Group: Goals to increase education on CBT skill positive affirmations; Increase self-esteem; Increase self-expression; Demonstrate positive use of time;       Comments:     Patient did not participate in Music Therapy group, despite staff encouragement and explanation of benefits.  Patient remain seclusive to self.  Q15 minute safety checks maintained for patient safety and will continue to encourage patient to attend unit programming.

## 2025-05-05 NOTE — GROUP NOTE
Group Therapy Note    Date: 5/5/2025    Group Start Time: 1330  Group End Time: 1410  Group Topic: Cognitive Skills    [facility]    Anika Sommers CTRS        Group Therapy Note    Attendees: 11/20       Patient's Goal:  pt will demonstrate improved coping skills and improved knowledge of community resources    Notes:   pt was pleasant and participated well    Status After Intervention:  Improved    Participation Level: Active Listener and Interactive    Participation Quality: Appropriate, Sharing, and Supportive      Speech:  normal      Thought Process/Content: Logical      Affective Functioning: flat      Mood: depressed      Level of consciousness:  Alert      Response to Learning: Able to verbalize current knowledge/experience, Capable of insight, and Progressing to goal      Endings: None Reported    Modes of Intervention: Education, Support, and Activity      Discipline Responsible: Psychoeducational Specialist      Signature:  LEV OSPINA

## 2025-05-05 NOTE — PLAN OF CARE
Problem: Self Harm/Suicidality  Goal: Will have no self-injury during hospital stay  Description: INTERVENTIONS:1.  Ensure constant observer at bedside with Q15M safety checks2.  Maintain a safe environment3.  Secure patient belongings4.  Ensure family/visitors adhere to safety recommendations5.  Ensure safety tray has been added to patient's diet order6.  Every shift and PRN: Re-assess suicidal risk via Frequent Screener  5/4/2025 2058 by Catrachita Durham RN  Outcome: Progressing     Problem: Depression  Goal: Will be euthymic at discharge  Description: INTERVENTIONS:1. Administer medication as ordered2. Provide emotional support via 1:1 interaction with staff3. Encourage involvement in milieu/groups/activities4. Monitor for social isolation  5/4/2025 2058 by Catrachita Durham, RN  Outcome: Progressing  Note: Patient is mostly isolative to room. She was out for a short time in the day room reading book but aloof of peers in day room. Patient is pleasant and cooperative during assessment. She reports mild improvement in depression and anxiety. She denies having suicidal thoughts this evening. She is agreeable to take scheduled medications. Staff maintains Q15 minute safety checks.

## 2025-05-05 NOTE — DISCHARGE INSTRUCTIONS
Information:  Medications:   Medication summary provided   I understand that I should take only the medications on my list.     -why and when I need to take each medicine.     -which side effects to watch for.     -that I should carry my medication information at all times in case of     Emergency situations.    I will take all of my medicines to follow up appointments.     -check with my physician or pharmacist before taking any new    Medication, over the counter product or drink alcohol.    -Ask about food, drug or dietary supplement interactions.    -discard old lists and update records with medication providers.    Notify Physician:  Notify physician if you notice:   Always call 911 if you feel your life is in danger  In case of an emergency call 911 immediately!  If 911 is not available call your local emergency medical system for help    Behavioral Health Follow Up:  Original Referral Source: [facility]  Discharge Diagnosis: Depression with suicidal ideation [F32.A, R45.851]  Recommendations for Level of Care:  Take medication as prescribed and keep all scheduled appointments  Patient status at discharge:  In control, denies any suicidal/homicidal ideations  My hospital  was: [facility]  Aftercare plan faxed:  [facility]   -faxed by: staff   -date: 5-5-2025   -time: 2:55 pm  Prescriptions: filled and sent from [facility], [facility]    Smoking: Quit Smoking.   Call the [employer]'s smoking quitline at 0-616-75R-QUIT  Know the signs of a heart attack   If you have any of the following symptoms call 911 immediately, do not wait more    Than five minutes.    1. Pressure, fullness and/ or squeezing in the center of the chest spreading to    The jaw, neck or shoulder.    2. Chest discomfort with light headedness, fainting, sweating, nausea or    Shortness of breath.   3. Upper abdominal pressure or discomfort.   4. Lower chest pain, back pain, unusual fatigue, shortness of breath, nausea   Or dizziness.

## 2025-05-05 NOTE — TRANSITION OF CARE
Pressure  /79   Pulse 98   Temp 97.4 °F (36.3 °C) (Temporal)   Resp 14   Ht 1.676 m (5' 6\")   Wt 93 kg (205 lb)   SpO2 96%   BMI 33.09 kg/m²      Fasting Blood Glucose or Hemoglobin A1c  No results found for: \"GLU\", \"GLUCPOC\"    Hemoglobin A1C   Date Value Ref Range Status   10/17/2024 5.2 4.0 - 6.0 % Final       Discharge Diagnosis: Depression with suicidal ideations    Discharge Plan/Destination: residence    Discharge Medication List and Instructions:      Medication List        START taking these medications      lidocaine 4 % external patch  Place 1 patch onto the skin daily  Start taking on: May 6, 2025  Notes to patient: pain     pantoprazole 40 MG tablet  Commonly known as: PROTONIX  Take 1 tablet by mouth every morning (before breakfast)  Replaces: omeprazole 20 MG delayed release capsule  Notes to patient: indigestion            CHANGE how you take these medications      cloZAPine 50 MG tablet  Commonly known as: CLOZARIL  Take 1 tablet by mouth nightly  What changed:   medication strength  how much to take  when to take this  Notes to patient: Mood/ clear thoughts            CONTINUE taking these medications      ferrous sulfate 325 (65 Fe) MG tablet  Commonly known as: IRON 325  Take 1 tablet by mouth in the morning and at bedtime  Notes to patient: supplement     fludrocortisone 0.1 MG tablet  Commonly known as: FLORINEF  Take 1 tablet by mouth daily  Notes to patient: Maintains salt and fluid in body (corticosteroid)     folic acid 1 MG tablet  Commonly known as: FOLVITE  Take 1 tablet by mouth daily  Notes to patient: supplement     hydrocortisone 5 MG tablet  Commonly known as: CORTEF  Take 3 tablets by mouth daily  Notes to patient: maintainence     levothyroxine 150 MCG tablet  Commonly known as: SYNTHROID  Take 1 tablet by mouth Daily  Notes to patient: Thyroid maintenance     venlafaxine 75 MG extended release capsule  Commonly known as: EFFEXOR XR  Take 3 capsules by mouth daily

## 2025-05-05 NOTE — GROUP NOTE
Group Therapy Note    Date: 5/5/2025    Group Start Time: 1000  Group End Time: 1030  Group Topic: Psychotherapy    [facility]    Kaden Ren        Group Therapy Note    Attendees: 11/22     Patient was offered group therapy today but declined to participate despite encouragement from staff. 1:1 was offered.  Signature:  Kaden Ren

## 2025-05-05 NOTE — BH NOTE
[facility] [place]  Admission Note     Admission Type:   Admission Type: Voluntary    Reason for admission:  Reason for Admission: Patient is homeless and stays at [facility]. Was having relationship issues causing increased depression and suicidal ideations to drink a bottle of Fabreeze.      Addictive Behavior:   Addictive Behavior  In the Past 3 Months, Have You Felt or Has Someone Told You That You Have a Problem With  : None    Medical Problems:   Past Medical History:   Diagnosis Date    Adrenal insufficiency     Depression     Diabetes mellitus type 2, diet-controlled ([facility])     Headache     Hypothyroidism     MEN (multiple endocrine neoplasia) ([facility])        Status EXAM:  Mental Status and Behavioral Exam  Normal: No  Level of Assistance: Independent/Self  Facial Expression: Flat  Affect: Blunt  Level of Consciousness: Alert  Frequency of Checks: 4 times per hour, close  Mood:Normal: No  Mood: Depressed, Anxious, Sad  Motor Activity:Normal: No  Motor Activity: Decreased  Eye Contact: Fair  Observed Behavior: Cooperative, Friendly, Preoccupied  Sexual Misconduct History: Current - no  Preception: [place] to person, [place] to time, [place] to place, [place] to situation  Attention:Normal: No  Attention: Distractible  Thought Processes: Unremarkable  Thought Content:Normal: No  Thought Content: Preoccupations  Depression Symptoms: Feelings of helplessness, Feelings of hopelessess, Feelings of worthlessness  Anxiety Symptoms: Generalized  Marni Symptoms: Poor judgment  Hallucinations: None  Delusions: No  Memory:Normal: Yes  Insight and Judgment: No  Insight and Judgment: Poor judgment, Poor insight    Tobacco Screening:  Practical Counseling, on admission, ok X, if applicable and completed (first 3 are required if patient doesn't refuse):            ( ) Recognizing danger situations (included triggers and roadblocks)                    ( ) Coping skills (new ways to manage stress,relaxation 
[facility] [place]  Discharge Note    Pt discharged with followings belongings:   Dental Appliances: None  Vision - Corrective Lenses: None  Hearing Aid: None  Jewelry: None  Body Piercings Removed: Yes  Clothing: Pants, Shirt, Sweater, Undergarments, Footwear, Shorts  Other Valuables: Credit/Debit Card, Personal Toiletries   Valuables sent home with patient or returned to patient. Patient educated on aftercare instructions: YES  Information faxed to [facility] by staff  at 1:56 PM .Patient verbalize understanding of AVS:  YES.    Status EXAM upon discharge:  Mental Status and Behavioral Exam  Normal: No  Level of Assistance: Independent/Self  Facial Expression: Flat  Affect: Appropriate  Level of Consciousness: Alert  Frequency of Checks: 4 times per hour, close  Mood:Normal: No  Mood: Depressed, Anxious  Motor Activity:Normal: No  Motor Activity: Decreased  Eye Contact: Good  Observed Behavior: Cooperative, Friendly  Sexual Misconduct History: Current - no  Preception: [place] to person, [place] to time, [place] to place  Attention:Normal: No  Attention: Distractible  Thought Processes: Blocking  Thought Content:Normal: No  Thought Content: Preoccupations  Depression Symptoms: Isolative, Loss of interest, Feelings of helplessness  Anxiety Symptoms: Generalized  Marni Symptoms: No problems reported or observed.  Hallucinations: None  Delusions: No  Memory:Normal: Yes  Insight and Judgment: No  Insight and Judgment: Poor judgment, Poor insight    Tobacco Screening:  Practical Counseling, on admission, ko X, if applicable and completed (first 3 are required if patient doesn't refuse):            ( x) Recognizing danger situations (included triggers and roadblocks)                    ( x) Coping skills (new ways to manage stress,relaxation techniques, changing routine, distraction)                                                           ( ) Basic information about quitting (benefits of quitting, techniques in how 
Patient arrived to the unit via wheelchair accompanied by [facility] staff. Calm and cooperative.   
Patient given tobacco quitline number 77040062759 at this time, refusing to call at this time, states \" I just dont want to quit now\"- patient given information as to the dangers of long term tobacco use. Continue to reinforce the importance of tobacco cessation.    
Patient given tobacco quitline number 84317482689 at this time, refusing to call at this time, states \" I just dont want to quit now\"- patient given information as to the dangers of long term tobacco use. Continue to reinforce the importance of tobacco cessation.   
Patient is a non-smoker, no additional education needed.   
Patient refused to attend wrap-up Group after encouragement from staff. 1:1 talk time offered but patient refused.   
Patient refused to attend wrap-up Group after encouragement from staff. 1:1 talk time offered but patient refused.   
Refusal  Patient refused medicated shampoo and lidocaine patch. States she doesn't need them at this time. Voiced continued depression and does not feel like she is improving. Discussed medication changes and how it can take some time to see improvement. Encouraged patient to attend group and participate in milieu versus isolating to room. Patient voiced understanding.  Esther Mancia RN   
Upon patient assessment, patient voiced she had increased depression and thoughts of suicidal ideations. Patient contracted for safety on the unit and stated she had no plan just an overwhelming feeling. Staff encouraged patient to voice any future concerns with staff as they arise. Staff continues to monitor patient intermittently and every 15 minutes.  
WILSON initiated: [id number]   
Writer informed patient that she could get her phone to check on her check and patient stated ok, and never came out for writer to get telephone. Passed on to next check.   
To be able to perform ambulation independently using rolling walker for 200 feet, using proper technique using AD, with proper posture and functional distance at home in 2 weeks.

## 2025-05-05 NOTE — DISCHARGE SUMMARY
Provider Discharge Summary     Patient ID:  Yaritza Esquivel  [id number]  49 y.o.  1975    Admit date: 4/28/2025    Discharge date and time: 5/5/2025  12:11 PM     Admitting Physician: Janice Ochoa MD     Discharge Physician: Janice Ochoa MD    Admission Diagnoses: Depression with suicidal ideation [F32.A, R45.851]    Discharge Diagnoses:      Depression with suicidal ideation     Patient Active Problem List   Diagnosis Code    Hypothyroidism E03.9    Schizophrenia (HCC) F20.9    Bipolar depression (HCC) F31.9    Depression F32.A    Depression with suicidal ideation F32.A, R45.851    Major depressive disorder, recurrent severe without psychotic features ([facility]) F33.2    Chronic midline low back pain without sciatica M54.50, G89.29    Scoliosis of lumbar spine M41.9    PTSD (post-traumatic stress disorder) F43.10    Suicidal ideation R45.851        Admission Condition: poor    Discharged Condition: stable    Indication for Admission: threat to self    History of Present Illnes (present tense wording is of findings from admission exam and are not necessarily indicative of current findings):   Yaritza Esquivel is a 49 y.o. female who has a past medical history of bipolar disorder, schizophrenia, seizures, COPD, hypothyroidism, CVA, parathyroidectomy, and bilateral adrenalectomy with pheochromocytoma.  Patient presented to the ED endorsing suicidal thoughts with a plan to overdose on Febreeze.  Per ED documentation, patient expressing chronic suicidal ideation for the last few weeks.  She is unable to contract for safety in the community at this time.       Patient does have 2 recent inpatient psychiatric hospitalizations to [facility] from 3/21/25- 4/1/25 and 4/8/25- 4/23/25.  She was discharged to [facility].  She is linked with Dr. Lennon at [facility] for medication management and had an appointment scheduled for 4/24.  Patient missed this appointment.  She later presented to [facility] where medications

## 2025-05-05 NOTE — CARE COORDINATION
Discharge Arrangements:  Writer offered patient disposition options of discharge to [facility] vs discharge to [facility] to request MST meeting. Patient chose discharge to [facility]    Guardian notified: n/a    Discharge destination/address:  [address] [place] [address]     Transported by:  cab    Patient was not accepting of referral.  Follow up appointment is scheduled for 5/6 at 3:15PM at [facility]    *RYLIE resources were offered to patient throughout admission and at time of discharge. This list of [facility] RYLIE providers was provided to patient:     [facility]  [address] [facility] [address]   [address] [facility] [address]  Phone: [phone]     Phone: [phone]    [facility] [place] [facility]  [place]   [address] [facility] [address]   [address] [facility] [address]  Phone: [phone]     Phone: [phone]    [facility]    [facility]  [address] [facility] [address]    [address] [facility] [address]  Phone: [phone]     Phone: [phone]    [facility]     [facility]   [address] [facility] [address]  [place] [address]      Phone: [phone]  Phone: [phone]        [facility]  [address] [facility]. [facility] [address]   [address] [place] [address]  Phone: [phone]     Phone:  [phone]    [employer]      [facility]  [address] [facility] [address]   [address] [facility] [address]  Phone: [phone]     Phone: [phone]    [facility]  [address] [facility] [address]   [address] [facility] [address]  Phone: [phone]     Phone: [phone]    [facility]  [facility]  [address] [facility] [address] [address] [facility]

## 2025-05-05 NOTE — PROGRESS NOTES
CLINICAL PHARMACY NOTE: MEDS TO BEDS    Total # of Prescriptions Filled: 7   The following medications were delivered to the patient:  Clozapine 50mg  Hydrocortisone 5mg tablets  Folic acid 1mg  Vitamin B-12 1000mcg  Ferosul 325*65FE) Iron  Pantoprazole 40mg  Lidocaine 4% patch    Additional Documentation: 5/5/25 3:11pm kbg delivered to [facility]     -Effexor too soon to fill on file at [facility] [phone] [address] too soon on file  -Synthroid too soon on file

## 2025-05-06 ENCOUNTER — HOSPITAL ENCOUNTER (EMERGENCY)
Age: 50
Discharge: HOME | End: 2025-05-07
Attending: EMERGENCY MEDICINE
Payer: COMMERCIAL

## 2025-05-06 VITALS
TEMPERATURE: 98.8 F | RESPIRATION RATE: 18 BRPM | WEIGHT: 200 LBS | OXYGEN SATURATION: 95 % | HEART RATE: 90 BPM | BODY MASS INDEX: 32.14 KG/M2 | HEIGHT: 66 IN | SYSTOLIC BLOOD PRESSURE: 142 MMHG | DIASTOLIC BLOOD PRESSURE: 83 MMHG

## 2025-05-06 DIAGNOSIS — R45.851 DEPRESSION WITH SUICIDAL IDEATION: Primary | ICD-10-CM

## 2025-05-06 DIAGNOSIS — F32.A DEPRESSION WITH SUICIDAL IDEATION: Primary | ICD-10-CM

## 2025-05-06 LAB
ALBUMIN SERPL-MCNC: 3.9 G/DL (ref 3.5–5.2)
ALP SERPL-CCNC: 110 U/L (ref 35–104)
ALT SERPL-CCNC: 13 U/L (ref 10–35)
AMPHET UR QL SCN: NEGATIVE
ANION GAP SERPL CALCULATED.3IONS-SCNC: 10 MMOL/L (ref 9–16)
AST SERPL-CCNC: 17 U/L (ref 10–35)
BARBITURATES UR QL SCN: NEGATIVE
BASOPHILS # BLD: 0.1 K/UL (ref 0–0.2)
BASOPHILS NFR BLD: 1 % (ref 0–2)
BENZODIAZ UR QL: NEGATIVE
BILIRUB SERPL-MCNC: <0.2 MG/DL (ref 0–1.2)
BUN SERPL-MCNC: 18 MG/DL (ref 6–20)
CALCIUM SERPL-MCNC: 9.7 MG/DL (ref 8.6–10.4)
CANNABINOIDS UR QL SCN: NEGATIVE
CHLORIDE SERPL-SCNC: 104 MMOL/L (ref 98–107)
CO2 SERPL-SCNC: 24 MMOL/L (ref 20–31)
COCAINE UR QL SCN: NEGATIVE
CREAT SERPL-MCNC: 0.7 MG/DL (ref 0.7–1.2)
EOSINOPHIL # BLD: 0.3 K/UL (ref 0–0.4)
EOSINOPHILS RELATIVE PERCENT: 3 % (ref 0–4)
ERYTHROCYTE [DISTWIDTH] IN BLOOD BY AUTOMATED COUNT: 14.5 % (ref 11.5–14.9)
ETHANOL PERCENT: NORMAL %
ETHANOLAMINE SERPL-MCNC: <10 MG/DL (ref 0–0.08)
FENTANYL UR QL: NEGATIVE
GFR, ESTIMATED: >90 ML/MIN/1.73M2
GLUCOSE SERPL-MCNC: 96 MG/DL (ref 74–99)
HCT VFR BLD AUTO: 35.8 % (ref 36–46)
HGB BLD-MCNC: 12 G/DL (ref 12–16)
LYMPHOCYTES NFR BLD: 2.6 K/UL (ref 1–4.8)
LYMPHOCYTES RELATIVE PERCENT: 28 % (ref 24–44)
MCH RBC QN AUTO: 30.5 PG (ref 26–34)
MCHC RBC AUTO-ENTMCNC: 33.4 G/DL (ref 31–37)
MCV RBC AUTO: 91.2 FL (ref 80–100)
METHADONE UR QL: NEGATIVE
MONOCYTES NFR BLD: 0.5 K/UL (ref 0.1–1.3)
MONOCYTES NFR BLD: 6 % (ref 1–7)
NEUTROPHILS NFR BLD: 62 % (ref 36–66)
NEUTS SEG NFR BLD: 5.7 K/UL (ref 1.3–9.1)
OPIATES UR QL SCN: NEGATIVE
OXYCODONE UR QL SCN: NEGATIVE
PCP UR QL SCN: NEGATIVE
PLATELET # BLD AUTO: 412 K/UL (ref 150–450)
PMV BLD AUTO: 6.8 FL (ref 6–12)
POTASSIUM SERPL-SCNC: 4.6 MMOL/L (ref 3.7–5.3)
PROT SERPL-MCNC: 7.1 G/DL (ref 6.6–8.7)
RBC # BLD AUTO: 3.92 M/UL (ref 4–5.2)
SODIUM SERPL-SCNC: 138 MMOL/L (ref 136–145)
TEST INFORMATION: NORMAL
WBC OTHER # BLD: 9.1 K/UL (ref 3.5–11)

## 2025-05-06 PROCEDURE — G0480 DRUG TEST DEF 1-7 CLASSES: HCPCS

## 2025-05-06 PROCEDURE — 99285 EMERGENCY DEPT VISIT HI MDM: CPT

## 2025-05-06 PROCEDURE — 80307 DRUG TEST PRSMV CHEM ANLYZR: CPT

## 2025-05-06 PROCEDURE — 85025 COMPLETE CBC W/AUTO DIFF WBC: CPT

## 2025-05-06 PROCEDURE — 36415 COLL VENOUS BLD VENIPUNCTURE: CPT

## 2025-05-06 PROCEDURE — 80053 COMPREHEN METABOLIC PANEL: CPT

## 2025-05-06 NOTE — ED NOTES
This writer consulted with Dr Cruz who indicated that \"we are not really helping this patient as she has been staying in this hospital for the past 4-5 weeks\" and she needs to follow up with outpatient treatment.  Patient declined this patient for admission.  ED physician notified and he requested an AM psych consult to be completed.    Writer completed psych consult.

## 2025-05-06 NOTE — CARE COORDINATION
Name: Yaritza Esquivel    : 1975    Auth number: [id number]     Discharge Date:     Destination: [facility]    *If you have any specific discharge questions, please contact the assigned /discharge planner: Hermelinda [phone]      Discharge Medications:      Medication List        START taking these medications      lidocaine 4 % external patch  Place 1 patch onto the skin daily  Notes to patient: pain     pantoprazole 40 MG tablet  Commonly known as: PROTONIX  Take 1 tablet by mouth every morning (before breakfast)  Replaces: omeprazole 20 MG delayed release capsule  Notes to patient: indigestion            CHANGE how you take these medications      cloZAPine 50 MG tablet  Commonly known as: CLOZARIL  Take 1 tablet by mouth nightly  What changed:   medication strength  how much to take  when to take this  Notes to patient: Mood/ clear thoughts            CONTINUE taking these medications      ferrous sulfate 325 (65 Fe) MG tablet  Commonly known as: IRON 325  Take 1 tablet by mouth in the morning and at bedtime  Notes to patient: supplement     fludrocortisone 0.1 MG tablet  Commonly known as: FLORINEF  Take 1 tablet by mouth daily  Notes to patient: Maintains salt and fluid in body (corticosteroid)     folic acid 1 MG tablet  Commonly known as: FOLVITE  Take 1 tablet by mouth daily  Notes to patient: supplement     hydrocortisone 5 MG tablet  Commonly known as: CORTEF  Take 3 tablets by mouth daily  Notes to patient: maintainence     levothyroxine 150 MCG tablet  Commonly known as: SYNTHROID  Take 1 tablet by mouth Daily  Notes to patient: Thyroid maintenance     venlafaxine 75 MG extended release capsule  Commonly known as: EFFEXOR XR  Take 3 capsules by mouth daily (with breakfast)  Notes to patient: Mood/ clear thoughts     vitamin B-12 1000 MCG tablet  Commonly known as: CYANOCOBALAMIN  Take 1 tablet by mouth daily  Notes to patient: supplement            STOP taking these

## 2025-05-06 NOTE — ED NOTES
Pt brought herself to the ED for SI. Pt denies any HI, VH, AH. Pt stated \"that she was just discharged yesterday and she was suppose to talk to Dr. Cruz and she didn't get to because she was discharged\". Pt is having SI with a plan to drink a bottle of febreze. Pt could not tell me what was causing her to have these thoughts of suicide and why she would want to kill herself. Pt denies any drug or alcohol use. Pt stated \"she is on medication's but has not taken them today\".

## 2025-05-06 NOTE — ED PROVIDER NOTES
EMERGENCY DEPARTMENT ENCOUNTER    Pt Name: Yaritza Esquivel  MRN: [id number]  Birthdate 1975  Date of evaluation: 5/6/25  CHIEF COMPLAINT     Suicidal      HISTORY OF PRESENT ILLNESS   HPI  .  Was just discharged yesterday from [facility].  States she is feeling suicidal hopeless.  Thoughts of overdosing by drinking for breeze.  She has a plan to act on it.  She has been staying at the [facility].  She states there is too many people there.  She is working on getting housing set up for herself.  Her  is involved.      REVIEW OF SYSTEMS     Review of Systems   All other systems reviewed and are negative.    PASTMEDICAL HISTORY     Past Medical History:   Diagnosis Date    Adrenal insufficiency     Depression     Diabetes mellitus type 2, diet-controlled ([facility])     Headache     Hypothyroidism     MEN (multiple endocrine neoplasia) ([facility])      Past Problem List  Patient Active Problem List   Diagnosis Code    Hypothyroidism E03.9    Schizophrenia ([facility]) F20.9    Bipolar depression ([facility]) F31.9    Depression F32.A    Depression with suicidal ideation F32.A, R45.851    Major depressive disorder, recurrent severe without psychotic features ([facility]) F33.2    Chronic midline low back pain without sciatica M54.50, G89.29    Scoliosis of lumbar spine M41.9    PTSD (post-traumatic stress disorder) F43.10    Suicidal ideation R45.851     SURGICAL HISTORY       Past Surgical History:   Procedure Laterality Date    THYROIDECTOMY      TONSILLECTOMY      TOTAL HIP ARTHROPLASTY Right      CURRENT MEDICATIONS       Previous Medications    CLOZAPINE (CLOZARIL) 50 MG TABLET    Take 1 tablet by mouth nightly    FERROUS SULFATE (IRON 325) 325 (65 FE) MG TABLET    Take 1 tablet by mouth in the morning and at bedtime    FLUDROCORTISONE (FLORINEF) 0.1 MG TABLET    Take 1 tablet by mouth daily    FOLIC ACID (FOLVITE) 1 MG TABLET    Take 1 tablet by mouth daily    HYDROCORTISONE (CORTEF) 5 MG TABLET    Take 3 tablets by mouth daily
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
## 2025-05-07 NOTE — CONSULTS
Department of Psychiatry  Consult Service   Psychiatric Assessment        REASON FOR CONSULT: Suicidal    CONSULTING PHYSICIAN: Brad Lomas     History obtained from: patient, EMR documentation    HISTORY OF PRESENT ILLNESS:          The patient is a 49 y.o. female with significant psychiatric history of bipolar disorder, schizophrenia, seizures, COPD, hypothyroidism, CVA, parathyroidectomy, and bilateral adrenalectomy with pheochromocytoma.  Patient presented to the ED endorsing suicidal thoughts with a plan to overdose on Febreeze.  Per ED documentation, patient was just discharged yesterday from [facility]. States she is feeling suicidal hopeless. Thoughts of overdosing by drinking for breeze. She has a plan to act on it. She has been staying at the [facility]. She states there is too many people there. She is working on getting housing set up for herself. Her  is involved.     Patient is well know to the hospital with two previous hospitalizations last month and multiple other hospitalizations.  She was just at [facility] 4/28/2 - 5/5/25 for a similar situation.  She was stabilized on Effexor XR and Clozaril and linked with the [facility].  Patient had a follow up appointment with the [facility] on 5/6/25 prior to hospitalization.    Patient was seen face to face today.  When asked about events leading to hospitalization, patient reports that she was compliant in meeting with her [facility]  yesterday, and she does not feel that she should have been discharged from [facility].  She reports that she still endorses suicidal ideations with a plan to overdose on Febreeze.  However, she has no intent on acting on the plan.  She denies that she went back to [facility] after she was discharged.  However, it is unclear where she slept at prior to coming to the ED.  She denies homicidal ideations, auditory or visual hallucinations, rigoberto, paranoia, or delusional thoughts.  She denies any illicit drug or alcohol

## 2025-05-20 ENCOUNTER — HOSPITAL ENCOUNTER (EMERGENCY)
Age: 50
Discharge: HOME | End: 2025-05-20
Attending: EMERGENCY MEDICINE
Payer: COMMERCIAL

## 2025-05-20 VITALS
TEMPERATURE: 98.1 F | DIASTOLIC BLOOD PRESSURE: 78 MMHG | RESPIRATION RATE: 16 BRPM | SYSTOLIC BLOOD PRESSURE: 129 MMHG | OXYGEN SATURATION: 98 % | HEART RATE: 55 BPM

## 2025-05-20 DIAGNOSIS — R45.851 SUICIDAL IDEATION: Primary | ICD-10-CM

## 2025-05-20 DIAGNOSIS — Z76.0 ENCOUNTER FOR MEDICATION REFILL: ICD-10-CM

## 2025-05-20 LAB
ALBUMIN SERPL-MCNC: 4.7 G/DL (ref 3.5–5.2)
ALBUMIN/GLOB SERPL: 1.4 {RATIO} (ref 1–2.5)
ALP SERPL-CCNC: 104 U/L (ref 35–104)
ALT SERPL-CCNC: 13 U/L (ref 10–35)
AMPHET UR QL SCN: NEGATIVE
ANION GAP SERPL CALCULATED.3IONS-SCNC: 12 MMOL/L (ref 9–16)
APAP SERPL-MCNC: <5 UG/ML (ref 10–30)
AST SERPL-CCNC: 21 U/L (ref 10–35)
BARBITURATES UR QL SCN: NEGATIVE
BASOPHILS # BLD: 0.08 K/UL (ref 0–0.2)
BASOPHILS NFR BLD: 1 % (ref 0–2)
BENZODIAZ UR QL: NEGATIVE
BILIRUB SERPL-MCNC: <0.2 MG/DL (ref 0–1.2)
BUN SERPL-MCNC: 16 MG/DL (ref 6–20)
CALCIUM SERPL-MCNC: 10.8 MG/DL (ref 8.6–10.4)
CANNABINOIDS UR QL SCN: NEGATIVE
CHLORIDE SERPL-SCNC: 102 MMOL/L (ref 98–107)
CO2 SERPL-SCNC: 24 MMOL/L (ref 20–31)
COCAINE UR QL SCN: NEGATIVE
CREAT SERPL-MCNC: 0.7 MG/DL (ref 0.6–0.9)
EOSINOPHIL # BLD: 0.57 K/UL (ref 0–0.44)
EOSINOPHILS RELATIVE PERCENT: 4 % (ref 1–4)
ERYTHROCYTE [DISTWIDTH] IN BLOOD BY AUTOMATED COUNT: 14.3 % (ref 11.8–14.4)
ETHANOL PERCENT: <0.01 %
ETHANOLAMINE SERPL-MCNC: <10 MG/DL (ref 0–0.08)
FENTANYL UR QL: NEGATIVE
GFR, ESTIMATED: >90 ML/MIN/1.73M2
GLUCOSE SERPL-MCNC: 87 MG/DL (ref 74–99)
HCG SERPL QL: NEGATIVE
HCT VFR BLD AUTO: 35 % (ref 36.3–47.1)
HGB BLD-MCNC: 11.2 G/DL (ref 11.9–15.1)
IMM GRANULOCYTES # BLD AUTO: 0.07 K/UL (ref 0–0.3)
IMM GRANULOCYTES NFR BLD: 1 %
LYMPHOCYTES NFR BLD: 3.07 K/UL (ref 1.1–3.7)
LYMPHOCYTES RELATIVE PERCENT: 23 % (ref 24–43)
MCH RBC QN AUTO: 30.4 PG (ref 25.2–33.5)
MCHC RBC AUTO-ENTMCNC: 32 G/DL (ref 28.4–34.8)
MCV RBC AUTO: 94.9 FL (ref 82.6–102.9)
METHADONE UR QL: NEGATIVE
MONOCYTES NFR BLD: 0.59 K/UL (ref 0.1–1.2)
MONOCYTES NFR BLD: 4 % (ref 3–12)
NEUTROPHILS NFR BLD: 67 % (ref 36–65)
NEUTS SEG NFR BLD: 8.97 K/UL (ref 1.5–8.1)
NRBC BLD-RTO: 0 PER 100 WBC
OPIATES UR QL SCN: NEGATIVE
OXYCODONE UR QL SCN: NEGATIVE
PCP UR QL SCN: NEGATIVE
PLATELET # BLD AUTO: 643 K/UL (ref 138–453)
PMV BLD AUTO: 8.7 FL (ref 8.1–13.5)
POTASSIUM SERPL-SCNC: 3.9 MMOL/L (ref 3.7–5.3)
PROT SERPL-MCNC: 8 G/DL (ref 6.6–8.7)
RBC # BLD AUTO: 3.69 M/UL (ref 3.95–5.11)
SALICYLATES SERPL-MCNC: <0.5 MG/DL (ref 0–10)
SODIUM SERPL-SCNC: 138 MMOL/L (ref 136–145)
T4 FREE SERPL-MCNC: 0.6 NG/DL (ref 0.92–1.68)
TEST INFORMATION: NORMAL
TSH SERPL DL<=0.05 MIU/L-ACNC: 28.8 UIU/ML (ref 0.27–4.2)
WBC OTHER # BLD: 13.4 K/UL (ref 3.5–11.3)

## 2025-05-20 PROCEDURE — 80307 DRUG TEST PRSMV CHEM ANLYZR: CPT

## 2025-05-20 PROCEDURE — 80053 COMPREHEN METABOLIC PANEL: CPT

## 2025-05-20 PROCEDURE — 80143 DRUG ASSAY ACETAMINOPHEN: CPT

## 2025-05-20 PROCEDURE — 84443 ASSAY THYROID STIM HORMONE: CPT

## 2025-05-20 PROCEDURE — 99285 EMERGENCY DEPT VISIT HI MDM: CPT

## 2025-05-20 PROCEDURE — G0480 DRUG TEST DEF 1-7 CLASSES: HCPCS

## 2025-05-20 PROCEDURE — 84703 CHORIONIC GONADOTROPIN ASSAY: CPT

## 2025-05-20 PROCEDURE — 80179 DRUG ASSAY SALICYLATE: CPT

## 2025-05-20 PROCEDURE — 85025 COMPLETE CBC W/AUTO DIFF WBC: CPT

## 2025-05-20 PROCEDURE — 84439 ASSAY OF FREE THYROXINE: CPT

## 2025-05-20 RX ORDER — FLUDROCORTISONE ACETATE 0.1 MG/1
0.1 TABLET ORAL DAILY
Qty: 90 TABLET | Refills: 0 | Status: SHIPPED | OUTPATIENT
Start: 2025-05-20

## 2025-05-20 RX ORDER — LEVOTHYROXINE SODIUM 150 UG/1
150 TABLET ORAL DAILY
Qty: 90 TABLET | Refills: 0 | Status: SHIPPED | OUTPATIENT
Start: 2025-05-20

## 2025-05-20 ASSESSMENT — PAIN - FUNCTIONAL ASSESSMENT: PAIN_FUNCTIONAL_ASSESSMENT: NONE - DENIES PAIN

## 2025-05-20 NOTE — ED TRIAGE NOTES
Patient presents to the ED ambulatory from Triage. Patient states that she believes that she is in adrenal crisis as she has been out of her medications for several weeks. Patient states that she just left [facility] and is still suicidal. Patient is in NAD, respirations are even and unlabored, bed in lowest position and call light with in reach. Patient was changed out and belongings taken by [facility] [employer]. Resident is bedside for evaluation. Writer will continue with plan of care.

## 2025-05-21 ENCOUNTER — APPOINTMENT (OUTPATIENT)
Dept: GENERAL RADIOLOGY | Age: 50
End: 2025-05-21
Payer: COMMERCIAL

## 2025-05-21 ENCOUNTER — HOSPITAL ENCOUNTER (EMERGENCY)
Age: 50
Discharge: HOME | End: 2025-05-21
Attending: EMERGENCY MEDICINE
Payer: COMMERCIAL

## 2025-05-21 ENCOUNTER — HOSPITAL ENCOUNTER (EMERGENCY)
Age: 50
Discharge: HOME | End: 2025-05-22
Attending: EMERGENCY MEDICINE
Payer: COMMERCIAL

## 2025-05-21 VITALS
HEART RATE: 94 BPM | WEIGHT: 200 LBS | TEMPERATURE: 98.6 F | HEIGHT: 66 IN | SYSTOLIC BLOOD PRESSURE: 111 MMHG | RESPIRATION RATE: 18 BRPM | OXYGEN SATURATION: 97 % | DIASTOLIC BLOOD PRESSURE: 74 MMHG | BODY MASS INDEX: 32.14 KG/M2

## 2025-05-21 VITALS
OXYGEN SATURATION: 100 % | RESPIRATION RATE: 16 BRPM | DIASTOLIC BLOOD PRESSURE: 81 MMHG | HEART RATE: 75 BPM | SYSTOLIC BLOOD PRESSURE: 132 MMHG

## 2025-05-21 DIAGNOSIS — R45.851 SUICIDAL IDEATION: Primary | ICD-10-CM

## 2025-05-21 DIAGNOSIS — R07.9 CHEST PAIN, UNSPECIFIED TYPE: Primary | ICD-10-CM

## 2025-05-21 LAB
AMPHET UR QL SCN: NEGATIVE
ANION GAP SERPL CALCULATED.3IONS-SCNC: 14 MMOL/L (ref 9–16)
BACTERIA URNS QL MICRO: ABNORMAL
BARBITURATES UR QL SCN: NEGATIVE
BENZODIAZ UR QL: NEGATIVE
BILIRUB UR QL STRIP: NEGATIVE
BUN SERPL-MCNC: 16 MG/DL (ref 6–20)
CALCIUM SERPL-MCNC: 10.5 MG/DL (ref 8.6–10.4)
CANNABINOIDS UR QL SCN: NEGATIVE
CASTS #/AREA URNS LPF: ABNORMAL /LPF
CHLORIDE SERPL-SCNC: 102 MMOL/L (ref 98–107)
CLARITY UR: ABNORMAL
CO2 SERPL-SCNC: 23 MMOL/L (ref 20–31)
COCAINE UR QL SCN: NEGATIVE
COLOR UR: ABNORMAL
CREAT SERPL-MCNC: 0.9 MG/DL (ref 0.6–0.9)
EPI CELLS #/AREA URNS HPF: ABNORMAL /HPF
ERYTHROCYTE [DISTWIDTH] IN BLOOD BY AUTOMATED COUNT: 14.5 % (ref 11.8–14.4)
FENTANYL UR QL: NEGATIVE
GFR, ESTIMATED: 78 ML/MIN/1.73M2
GLUCOSE SERPL-MCNC: 95 MG/DL (ref 74–99)
GLUCOSE UR STRIP-MCNC: NEGATIVE MG/DL
HCT VFR BLD AUTO: 36.4 % (ref 36.3–47.1)
HGB BLD-MCNC: 11.5 G/DL (ref 11.9–15.1)
HGB UR QL STRIP.AUTO: NEGATIVE
KETONES UR STRIP-MCNC: ABNORMAL MG/DL
LEUKOCYTE ESTERASE UR QL STRIP: NEGATIVE
MCH RBC QN AUTO: 29.8 PG (ref 25.2–33.5)
MCHC RBC AUTO-ENTMCNC: 31.6 G/DL (ref 28.4–34.8)
MCV RBC AUTO: 94.3 FL (ref 82.6–102.9)
METHADONE UR QL: NEGATIVE
NITRITE UR QL STRIP: NEGATIVE
NRBC BLD-RTO: 0 PER 100 WBC
OPIATES UR QL SCN: NEGATIVE
OXYCODONE UR QL SCN: NEGATIVE
PCP UR QL SCN: NEGATIVE
PH UR STRIP: 5 [PH] (ref 5–8)
PLATELET # BLD AUTO: 633 K/UL (ref 138–453)
PMV BLD AUTO: 8.4 FL (ref 8.1–13.5)
POTASSIUM SERPL-SCNC: 4.2 MMOL/L (ref 3.7–5.3)
PROT UR STRIP-MCNC: ABNORMAL MG/DL
RBC # BLD AUTO: 3.86 M/UL (ref 3.95–5.11)
RBC #/AREA URNS HPF: ABNORMAL /HPF
SODIUM SERPL-SCNC: 139 MMOL/L (ref 136–145)
SP GR UR STRIP: 1.03 (ref 1–1.03)
TEST INFORMATION: NORMAL
TROPONIN I SERPL HS-MCNC: 6 NG/L (ref 0–14)
TROPONIN I SERPL HS-MCNC: <6 NG/L (ref 0–14)
UROBILINOGEN UR STRIP-ACNC: NORMAL EU/DL (ref 0–1)
WBC #/AREA URNS HPF: ABNORMAL /HPF
WBC OTHER # BLD: 14.4 K/UL (ref 3.5–11.3)

## 2025-05-21 PROCEDURE — 80048 BASIC METABOLIC PNL TOTAL CA: CPT

## 2025-05-21 PROCEDURE — 80307 DRUG TEST PRSMV CHEM ANLYZR: CPT

## 2025-05-21 PROCEDURE — 85027 COMPLETE CBC AUTOMATED: CPT

## 2025-05-21 PROCEDURE — 93005 ELECTROCARDIOGRAM TRACING: CPT | Performed by: EMERGENCY MEDICINE

## 2025-05-21 PROCEDURE — 99285 EMERGENCY DEPT VISIT HI MDM: CPT | Performed by: EMERGENCY MEDICINE

## 2025-05-21 PROCEDURE — 71046 X-RAY EXAM CHEST 2 VIEWS: CPT

## 2025-05-21 PROCEDURE — 99285 EMERGENCY DEPT VISIT HI MDM: CPT

## 2025-05-21 PROCEDURE — 84484 ASSAY OF TROPONIN QUANT: CPT

## 2025-05-21 PROCEDURE — 81001 URINALYSIS AUTO W/SCOPE: CPT

## 2025-05-21 ASSESSMENT — ENCOUNTER SYMPTOMS
SHORTNESS OF BREATH: 0
TROUBLE SWALLOWING: 0
FACIAL SWELLING: 0
BACK PAIN: 0
VOMITING: 0
EYE PAIN: 0
PHOTOPHOBIA: 0
ABDOMINAL PAIN: 0
CHEST TIGHTNESS: 0
NAUSEA: 0
VOICE CHANGE: 0
COLOR CHANGE: 0

## 2025-05-21 ASSESSMENT — PAIN - FUNCTIONAL ASSESSMENT: PAIN_FUNCTIONAL_ASSESSMENT: NONE - DENIES PAIN

## 2025-05-21 ASSESSMENT — HEART SCORE: ECG: NORMAL

## 2025-05-21 NOTE — ED NOTES
Writer informed patient regarding lack of contact with [facility]. Patient asked for insurance to transport her to [facility] walk-in clinic. Writer stated insurance transports to residences only, writer can provide one-time bus pass if patient chooses to use it this time. Patient chose to use bus pass if [facility] does not pick her up.

## 2025-05-21 NOTE — ED NOTES
Patient has remained in the ED lobby all day with varied reports of attempts at getting to [facility] walk-in then [facility] while refusing transport options given by writer. [facility] Writer contacted [facility] who stated they informed patient she may come for an assessment (she was refused by their physician yesterday/evening) but they aren't coming to get her. Writer to reinforce with patient she may use the bus pass writer previously offered or she may get to [facility] on her own but she may not continue to loiter in the ED lobby.     Patient continues to refuse writer's offer of bus pass. Patient arranged insurance transport to [facility] using lobby phone. Writer verified patient's morning discharge with insurance company on lobby phone.

## 2025-05-21 NOTE — ED PROVIDER NOTES
[facility] EMERGENCY DEPARTMENT  Emergency Department Encounter  Emergency Medicine      Pt Name:Yaritza Esquivel  MRN: [id number]  Birthdate 1975  Date of evaluation: 5/21/25  PCP:  Jimena, Unspecified (Inactive)  8:47 AM EDT      CHIEF COMPLAINT       Chief Complaint   Patient presents with    Chest Pain       HISTORY OF PRESENT ILLNESS  (Location/Symptom, Timing/Onset, Context/Setting, Quality, Duration, Modifying Factors, Severity.)      Yaritza Esquivel is a 49 y.o. female who presents with chest pain to her anterior chest that started this morning, PAtient was seen in ER last night because she did not feel well but was discharged, she typically stays [facility] but was unable to go there last night, so she was outside all night with the rain, and only in paper scrub top, she is cold to touch, brought in by EMS.   Has a h/o adrenal insufficiency and takes daily florinef but needs this refilled, she also take cortef which she is complaint with  She was on her way to [facility] this morning to talk to her .      Chart actually shows she was seen in the ER last night due to SI,and needing medications refilled  Was admitted last to [facility] on 5/5    PAST MEDICAL / SURGICAL / SOCIAL / FAMILY HISTORY      has a past medical history of Adrenal insufficiency, Depression, Diabetes mellitus type 2, diet-controlled (HCC), Headache, Hypothyroidism, and MEN (multiple endocrine neoplasia) (HCC).       has a past surgical history that includes Thyroidectomy; Tonsillectomy; and Total hip arthroplasty (Right).      Social History     Socioeconomic History    Marital status: Single     Spouse name: Not on file    Number of children: Not on file    Years of education: Not on file    Highest education level: Not on file   Occupational History    Not on file   Tobacco Use    Smoking status: Some Days     Current packs/day: 0.50     Average packs/day: 0.5 packs/day for 28.0 years (14.0 ttl pk-yrs)     Types:
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
## 2025-05-21 NOTE — ED PROVIDER NOTES
[facility] EMERGENCY DEPARTMENT  Emergency Department  Faculty Sign-Out Addendum     Care of Yaritza Esquivel was assumed from previous attending and is being seen for Medication Refill and Suicidal  .  The patient's initial evaluation and plan have been discussed with the prior provider who initially evaluated the patient.      I reviewed the resident’s note and agree with the documented findings and plan of care. Any areas of disagreement are noted on the chart. I was personally present for the key portions of any procedures. I have documented in the chart those procedures where I was not present during the key portions. I have reviewed the emergency nurses triage note. I agree with the chief complaint, past medical history, past surgical history, allergies, medications, social and family history as documented unless otherwise noted below.      EMERGENCY DEPARTMENT COURSE / MEDICAL DECISION MAKING:       MEDICATIONS GIVEN:  Orders Placed This Encounter   Medications    levothyroxine (SYNTHROID) 150 MCG tablet     Sig: Take 1 tablet by mouth daily     Dispense:  90 tablet     Refill:  0    fludrocortisone (FLORINEF) 0.1 MG tablet     Sig: Take 1 tablet by mouth daily     Dispense:  90 tablet     Refill:  0       LABS / RADIOLOGY:     Labs Reviewed   CBC WITH AUTO DIFFERENTIAL - Abnormal; Notable for the following components:       Result Value    WBC 13.4 (*)     RBC 3.69 (*)     Hemoglobin 11.2 (*)     Hematocrit 35.0 (*)     Platelets 643 (*)     Neutrophils % 67 (*)     Lymphocytes % 23 (*)     Immature Granulocytes % 1 (*)     Neutrophils Absolute 8.97 (*)     Eosinophils Absolute 0.57 (*)     All other components within normal limits   COMPREHENSIVE METABOLIC PANEL - Abnormal; Notable for the following components:    Calcium 10.8 (*)     All other components within normal limits   TSH REFLEX TO FT4 - Abnormal; Notable for the following components:    TSH 28.80 (*)     All other components within normal 
florinef and synthroid.  She has no other complaints at this time.  On exam she awake alert answering questions.  No acute distress.  Heart sounds regular.  Lungs clear to auscultation.  Abdomen soft nontender nondistended.  Has flat affect but good insight.  Talking in full sentences.  Alert oriented x 4    Medical Decision Making  Has not had her Florinef for her Synthroid will check basic labs.  And then plan to represcribe these.  Also suicidal and now has a plan and therefore cannot follow her safety plan that she had from staff.  We will get screening labs and presented BHI    Amount and/or Complexity of Data Reviewed  Labs: ordered.         Critical Care: None     Nena Hoffmann MD  Attending Emergency Physician         Nena Hoffmann MD  05/20/25 6301    
Panel [NHANES]     Frequency of Communication with Friends and Family: More than three times a week     Frequency of Social Gatherings with Friends and Family: Once a week     Attends [employer] Services: Patient declined     Active Member of Clubs or Organizations: No     Attends Club or Organization Meetings: Patient declined     Marital Status: Never    Intimate Partner Violence: Not At Risk (8/8/2022)    Received from [place]    Humiliation, Afraid, Rape, and Kick questionnaire     Fear of Current or Ex-Partner: No     Emotionally Abused: No     Physically Abused: No     Sexually Abused: No   Housing Stability: High Risk (4/29/2025)    Housing Stability Vital Sign     Unable to Pay for Housing in the Last Year: No     Number of Times Moved in the Last Year: 0     Homeless in the Last Year: Yes       Family History   Problem Relation Age of Onset    Diabetes Mother     Cancer Mother         MEN syndrome    Stroke Brother         MEN syndrome    Depression Brother         Committed suicide    Hypertension Maternal Grandfather     Hypertension Paternal Grandfather        Allergies:  Meperidine, Morphine, Acetaminophen, Buprenorphine hcl-naloxone hcl, Beef-derived drug products, Celecoxib, Desipramine, Gabapentin, Norpramin [desipramine hcl], Pcn [penicillins], Pregabalin, Sulfa antibiotics, and Methadone    Home Medications:  Prior to Admission medications    Medication Sig Start Date End Date Taking? Authorizing Provider   levothyroxine (SYNTHROID) 150 MCG tablet Take 1 tablet by mouth daily 5/20/25  Yes Sonja Bartlett MD   fludrocortisone (FLORINEF) 0.1 MG tablet Take 1 tablet by mouth daily 5/20/25  Yes Sonja Bartlett MD   hydrocortisone (CORTEF) 5 MG tablet Take 3 tablets by mouth daily 5/5/25   Janice Ochoa MD   ferrous sulfate (IRON 325) 325 (65 Fe) MG tablet Take 1 tablet by mouth in the morning and at bedtime 5/5/25   Janice Ochoa MD   folic acid (FOLVITE) 1 MG tablet Take 1 tablet by mouth

## 2025-05-21 NOTE — DISCHARGE INSTRUCTIONS
Thank you for visiting [facility] Emergency Department.    You were given prescriptions for your Synthroid and Florinef today.  It is very important that you take these medications.  Not taking them is dangerous.  You were given follow-up information for [facility].    You need to call a primary care provider to make an appointment as directed for follow up.    Should you have any questions regarding your care or further treatment, please call [facility] Emergency Department at [phone].

## 2025-05-21 NOTE — ED NOTES
Pt to room 19 with c/o chest pain. Pt reports that she was discharged last night and wasn't able to get into the shelter after she was discharged. Pt reports that that she is having chest pain. Pt placed on continuous cardiac monitor, bp and pulse ox. EKG completed, IV established, blood work drawn.   Pt alert and oriented x4, talking in complete sentences, respirations even and unlabored. Pt acting age appropriate. Will continue to plan of care.

## 2025-05-21 NOTE — ED NOTES
Patient declined admission to [facility] by Dr. Avelar as he reported patient does not benefit from inpatient treatment.  Dr. Avelar recommended that patient be send back to [facility].      Writer contacted [facility], spoke with supervisor Adeline.  Adeline reported that patient was declined by the physician earlier today.  Additionally, they do not have any female beds available.  Patient was placed on a safety plan by [facility] earlier today.  [facility] also declined.     Physicians updated.   
Patient is resting on cot, no acute distress noted. Patient is AOx4. Bed is in lowest position with constant obersver with in reach. Patient denies any further needs at this time and will continue with plan of care.     
Patient is resting on cot, no acute distress noted. Patient is AOx4. Bed is in lowest position with constant observer with in reach. Patient denies any further needs at this time and will continue with plan of care.     
Patient is resting on cot, no acute distress noted. Patient is AOx4. Bed is in lowest position with constant observer with in reach. Patient denies any further needs at this time and will continue with plan of care.     
Safety Plan complete.  Patient in agreement to return to ER, call 911 or go to [facility] if symptoms increase or worsen.  Patient reports she will be able to maintain her safety at discharge.   
Writer met with patient, a 49 year old female, who presented to the ED with a complaint of suicidal ideations.  Writer discussed call from [facility] indicating patient was just assessed, safety planned and discharged from [facility] minutes before her arrival to the ED. [facility] had indicated that patient wanted to be seen in the ED for a medication refill and they were providing a courtesy ride. [facility] stated that patient has malingering behaviors and pushes for inpatient treatment as she is currently homeless.    Patient stated, \"I wasn't all better when I left [facility].  I was still suicidal, they just didn't care.\"  Patient reports that she has a plan to overdose on her Clozaril, \"because I'm tired of it all.\"  Patient indicates she has been \"seriously thinking about suicide for 5 days now.\"  When assessed for new stressors/triggers patient states, \"I can't give you anything specific.\" Although patient does not readily identify stressors she is currently homeless and reports her SSI stopped and she is unsure why. Patient states that she is currently on restriction from [facility] and she is not sure when she can return.  She reports that she had her MST meeting but is still not allowed back, \"because they are all liars.\" She reports that she has been on restriction for at least 30 days. Patient states that she is connected with the [facility] for psychiatry and case management.  She reports that she is not sure what is happening with her therapy appointments, \"I had one and I don't anymore.\"  Explored additional housing options, she indicates that her  has been working to try and link patient with another shelter or group home. Patient also reports  is helping to resolve SSI issues and she has an appointment with social security on 6/20/25.  Patient potential would benefit from a more intensive program such as an ACT team given her recidivism with psychiatric admissions, homelessness and 
Writer received a call from [facility] prior to patient arrival, spoke with manager Luh.  Luh stated that patient was assessed by them and is being discharged.  Luh reported that patient requested a ride to the ED for a medication refill of one of her physical health medications. Luh indicated that patient is homeless and pushes for inpatient treatment.      Patient then arrived to the ED and checked in for suicidal ideations.  Writer called [facility] back and spoke to Luh.  Luh clarified that patient has several complaints upon her arrival to [facility].  Luh reported that patient was assessed, deemed to be safe, safety planned and discharged.  Luh stated that patient was in agreement with her safety plan and agreed that she was able to maintain her safety. Luh stated patients only request was for her physical health medication and an evaluation as she was having some symptoms.   
07:31 PM     CMP:   Lab Results   Component Value Date/Time     05/20/2025 07:31 PM    K 3.9 05/20/2025 07:31 PM     05/20/2025 07:31 PM    CO2 24 05/20/2025 07:31 PM    BUN 16 05/20/2025 07:31 PM    CREATININE 0.7 05/20/2025 07:31 PM    GFRAA >60 08/10/2019 07:22 AM    GFRAA >60 06/02/2012 05:45 PM    AGRATIO 1.2 06/02/2012 05:45 PM    LABGLOM >90 05/20/2025 07:31 PM    GLUCOSE 87 05/20/2025 07:31 PM    CALCIUM 10.8 05/20/2025 07:31 PM    BILITOT <0.2 05/20/2025 07:31 PM    ALKPHOS 104 05/20/2025 07:31 PM    AST 21 05/20/2025 07:31 PM    ALT 13 05/20/2025 07:31 PM     Drug Panel:   Lab Results   Component Value Date/Time    OPIAU NEGATIVE 05/06/2025 06:58 PM    LABCOMM  03/21/2025 10:20 PM     Microscopic exam not performed based on chemical results unless requested in original order.     UA:  Lab Results   Component Value Date/Time    COLORU Yellow 04/08/2025 08:04 PM    PROTEINU NEGATIVE 04/08/2025 08:04 PM    GLUCOSEU NEGATIVE 04/08/2025 08:04 PM    GLUCOSEU NEGATIVE 06/02/2012 05:40 PM    KETUA NEGATIVE 04/08/2025 08:04 PM    BILIRUBINUR NEGATIVE 04/08/2025 08:04 PM    BLOODU TRACE 06/02/2012 05:40 PM    UROBILINOGEN Normal 04/08/2025 08:04 PM    NITRU NEGATIVE 04/08/2025 08:04 PM    LEUKOCYTESUR NEGATIVE 04/08/2025 08:04 PM    WBCUA 0 TO 2 04/08/2025 08:04 PM    RBCUA 0 TO 2 04/08/2025 08:04 PM    BACTERIA None 04/08/2025 08:04 PM     PREGNANCY TEST:   Lab Results   Component Value Date/Time    PREGTESTUR NEGATIVE 08/09/2019 02:18 PM       Dialysis: No    Target Destination: [place]    Insurance: Payor: [employer] [place] /  /  /      Current Psychotic Symptoms  Harmful Actions Towards Others:    Orientation Level:    Speech Pattern:    General Attitude:    Emotions:    Delusions:    Hallucination:       Any Suicidal Ideations: High Risk    Homicidal Ideations/Violence Risk:   Observed Violent Behavior:    Homicidal Ideations:      Past Psychiatric History:       Diagnosis Date    
Depression  [] Suicide attempt      [] Low self-esteem  [] Hallucinations      [] Feeling of Hopelessness  [] Substance abuse or withdrawal    [] Dysfunctional family  [] Major traumatic event, eg., divorce, etc   [] Excessive stress/anxiety    5/20/25    Expected Outcomes    Patient will:   [x] Patient will remain safe for the duration of their stay   [x] Patient's environment will be safe, eg. Free of potential suicide weapons   [] Verbalize Recovery from suicidal episode and improvement in self-worth   [x] Discuss feeling that precipitated suicide attempt/thoughts/behavior   [] Will describe available resources for crisis prevention and management   [] Will verbalize positive coping skills     Nursing Intervention   [x] Assessment and Observations hourly   [x] Suicide Precautions implemented with patient, should be 1:1 observation   [x] Document observation [id number] and RN assessment hourly   [] Consult physician for:    [] Psychiatric consult    [] Pharmacological therapy    [] Other:    [x] Patient search completed by security   [x] Initiated appropriate safety protocols by removing from the patient's environment anything that could be used to inflict self injury, eg. Order safe tray, snap gown, etc   [x] Maintain open, warm, caring, non-judgmental attitude/manner towards patient   [] Discuss advantages and disadvantages of existing coping methods/skills   [x] Assist and educate patient with identifying present strengths and coping skills   [x] Keep patient informed regarding plan of care and provide clear concise explanations.  Provide the patient/family education information as well as telephone numbers and other information about crisis centers, hot lines, and counselors.    Discharge Planning:   [] Referral  [] Groups [] Health agencies  [] Other:

## 2025-05-22 LAB
EKG ATRIAL RATE: 91 BPM
EKG P AXIS: 76 DEGREES
EKG P-R INTERVAL: 184 MS
EKG Q-T INTERVAL: 334 MS
EKG QRS DURATION: 60 MS
EKG QTC CALCULATION (BAZETT): 410 MS
EKG R AXIS: -26 DEGREES
EKG T AXIS: 73 DEGREES
EKG VENTRICULAR RATE: 91 BPM

## 2025-05-22 NOTE — ED NOTES
MANDI contacted [facility]. SW informed pt is currently restricted from the shelter and has to make arrangements for a MST meeting.       Bus provided to pt.  Pt states pt feels safe being discharged. Pt plans to stay in the waiting room until the bus arrives.     The following service(s) is/are recommended for behavioral health follow-up:   Contact [facility] Crisis Care line [phone] any time for support.   Medications: Take Medications as prescribed. Let your physician know if you have any concerns.   Recovery Help line for assistance with linkage to mental health and substance use services. Call 211.   Return to Emergency Department if you have any further medical concerns.   Patient given National suicide prevention line at 1-223.755.3731.   Patient is to follow-up with providers at [facility]

## 2025-05-22 NOTE — ED PROVIDER NOTES
EMERGENCY DEPARTMENT ENCOUNTER    Pt Name: Yaritza Esquivel  MRN: [id number]  Birthdate 1975  Date of evaluation: 5/21/25  CHIEF COMPLAINT       Chief Complaint   Patient presents with    Mental Health Problem     HISTORY OF PRESENT ILLNESS   49-year-old female presenting to the ER after being sent by the [facility] for suicidal and homicidal ideations.  Patient states she would jump off the bridge to hurt herself and she would strangle somebody else to hurt somebody else.  Patient does admit to an underlying history of depression.  Patient states she is utilizing her medications as prescribed.    The history is provided by the patient.   Mental Health Problem  Presenting symptoms: homicidal ideas and suicidal thoughts    Presenting symptoms: no hallucinations    Associated symptoms: no abdominal pain, no appetite change, no chest pain, no fatigue and no headaches            REVIEW OF SYSTEMS     Review of Systems   Constitutional:  Negative for activity change, appetite change and fatigue.   HENT:  Negative for facial swelling, trouble swallowing and voice change.    Eyes:  Negative for photophobia and pain.   Respiratory:  Negative for chest tightness and shortness of breath.    Cardiovascular:  Negative for chest pain and palpitations.   Gastrointestinal:  Negative for abdominal pain, nausea and vomiting.   Genitourinary:  Negative for dysuria and urgency.   Musculoskeletal:  Negative for arthralgias and back pain.   Skin:  Negative for color change and rash.   Neurological:  Negative for dizziness, syncope and headaches.   Psychiatric/Behavioral:  Positive for homicidal ideas and suicidal ideas. Negative for behavioral problems and hallucinations.      PASTMEDICAL HISTORY     Past Medical History:   Diagnosis Date    Adrenal insufficiency     Depression     Diabetes mellitus type 2, diet-controlled (HCC)     Headache     Hypothyroidism     MEN (multiple endocrine neoplasia) ([facility])      Past Problem List  Patient

## 2025-05-22 NOTE — ED NOTES
Mode of arrival (squad #, walk in, police, etc) : Walk-in        Chief complaint(s): Mental health        Arrival Note (brief scenario, treatment PTA, etc).: Pt arrived to the ED stating that she needs help with her medications. When asking if suicidal or homicidal patient responds with \"sometimes\". Pt states that if she were to do something she would probably take all her pills or drink febreeze. When asked it patient will act out plan today, pt responds with \"maybe\".        C= \"Have you ever felt that you should Cut down on your drinking?\"  No  A= \"Have people Annoyed you by criticizing your drinking?\"  No  G= \"Have you ever felt bad or Guilty about your drinking?\"  No  E= \"Have you ever had a drink as an Eye-opener first thing in the morning to steady your nerves or to help a hangover?\"  No      Deferred []      Reason for deferring: N/A    *If yes to two or more: probable alcohol abuse.*

## 2025-05-22 NOTE — ED NOTES
Provisional Diagnosis:   Malingering     Psychosocial and Contextual Factors: Pt is homeless.     C-SSRS Summary:    Patient: X    Family:     Agency: X (EPIC)    Present Suicidal Behavior:     Verbal: X    Attempt:       Past Suicidal Behavior:     Verbal: X    Attempt: X      Self- Injurious/ Self-Mutilation:  Pt denies    Trauma History: None reported at this time.    Protective Factors: Pt has insurance.     Risk Factors: Pt has poor judgement and coping skills. Frequent admissions and homelessness.    Substance Abuse: Pt denies    Clinical Summary:  Yaritza Esquivel is a 49 year old female who presents to the ED via [facility]. Pt did not meet criteria at [facility] for an inpatient admission so pt asked to be brought to the ED. Pt has passive suicidal ideations with a plan to jump off of the bridge. Pt identifies issues with pt's family as the trigger to pt's SI. Pt denies HI/AH/VH. Pt was admitted to [facility] 4/28/25-5/5/25. Pt is linked with [facility]. Pt denies substance abuse. Pt reports good sleep and a good appetite.       Level of Care Disposition:.MANDI consulted with NICK Lemus from psychiatry. Pt declined an admission to the [facility] at this time as pt does not benefit from hospitalization and appears to be malingering.

## 2025-06-14 ENCOUNTER — HOSPITAL ENCOUNTER (EMERGENCY)
Age: 50
Discharge: HOME | End: 2025-06-14
Attending: STUDENT IN AN ORGANIZED HEALTH CARE EDUCATION/TRAINING PROGRAM
Payer: COMMERCIAL

## 2025-06-14 ENCOUNTER — HOSPITAL ENCOUNTER (EMERGENCY)
Age: 50
Discharge: HOME | End: 2025-06-14
Attending: EMERGENCY MEDICINE
Payer: COMMERCIAL

## 2025-06-14 VITALS
DIASTOLIC BLOOD PRESSURE: 132 MMHG | BODY MASS INDEX: 27.77 KG/M2 | SYSTOLIC BLOOD PRESSURE: 146 MMHG | TEMPERATURE: 97.4 F | HEIGHT: 72 IN | OXYGEN SATURATION: 99 % | RESPIRATION RATE: 17 BRPM | WEIGHT: 205 LBS | HEART RATE: 89 BPM

## 2025-06-14 VITALS
RESPIRATION RATE: 18 BRPM | DIASTOLIC BLOOD PRESSURE: 98 MMHG | HEART RATE: 78 BPM | OXYGEN SATURATION: 100 % | TEMPERATURE: 98.4 F | SYSTOLIC BLOOD PRESSURE: 138 MMHG

## 2025-06-14 DIAGNOSIS — Z76.5 MALINGERING: ICD-10-CM

## 2025-06-14 DIAGNOSIS — Z59.00 HOMELESSNESS: ICD-10-CM

## 2025-06-14 DIAGNOSIS — E27.40 ADRENAL INSUFFICIENCY: Primary | ICD-10-CM

## 2025-06-14 DIAGNOSIS — E89.6 HISTORY OF TOTAL ADRENALECTOMY: ICD-10-CM

## 2025-06-14 DIAGNOSIS — R45.851 SUICIDAL IDEATION: ICD-10-CM

## 2025-06-14 DIAGNOSIS — R56.9 SEIZURE-LIKE ACTIVITY (HCC): Primary | ICD-10-CM

## 2025-06-14 LAB
ALBUMIN SERPL-MCNC: 4.5 G/DL (ref 3.5–5.2)
ALBUMIN/GLOB SERPL: 1.2 {RATIO} (ref 1–2.5)
ALP SERPL-CCNC: 186 U/L (ref 35–104)
ALT SERPL-CCNC: 16 U/L (ref 10–35)
ANION GAP SERPL CALCULATED.3IONS-SCNC: 17 MMOL/L (ref 9–16)
AST SERPL-CCNC: 23 U/L (ref 10–35)
BASOPHILS # BLD: 0.16 K/UL (ref 0–0.2)
BASOPHILS NFR BLD: 1 % (ref 0–2)
BILIRUB SERPL-MCNC: 0.2 MG/DL (ref 0–1.2)
BUN SERPL-MCNC: 17 MG/DL (ref 6–20)
CALCIUM SERPL-MCNC: 10.2 MG/DL (ref 8.6–10.4)
CHLORIDE SERPL-SCNC: 98 MMOL/L (ref 98–107)
CHP ED QC CHECK: YES
CO2 SERPL-SCNC: 19 MMOL/L (ref 20–31)
CREAT SERPL-MCNC: 0.6 MG/DL (ref 0.6–0.9)
EOSINOPHIL # BLD: 0.52 K/UL (ref 0–0.44)
EOSINOPHILS RELATIVE PERCENT: 3 % (ref 1–4)
ERYTHROCYTE [DISTWIDTH] IN BLOOD BY AUTOMATED COUNT: 14.6 % (ref 11.8–14.4)
GFR, ESTIMATED: >90 ML/MIN/1.73M2
GLUCOSE BLD-MCNC: 97 MG/DL
GLUCOSE BLD-MCNC: 97 MG/DL (ref 65–105)
GLUCOSE SERPL-MCNC: 99 MG/DL (ref 74–99)
HCG SERPL QL: NEGATIVE
HCT VFR BLD AUTO: 47.4 % (ref 36.3–47.1)
HGB BLD-MCNC: 14.3 G/DL (ref 11.9–15.1)
IMM GRANULOCYTES # BLD AUTO: 0.17 K/UL (ref 0–0.3)
IMM GRANULOCYTES NFR BLD: 1 %
LYMPHOCYTES NFR BLD: 3.84 K/UL (ref 1.1–3.7)
LYMPHOCYTES RELATIVE PERCENT: 19 % (ref 24–43)
MAGNESIUM SERPL-MCNC: 2.1 MG/DL (ref 1.6–2.6)
MCH RBC QN AUTO: 30.4 PG (ref 25.2–33.5)
MCHC RBC AUTO-ENTMCNC: 30.2 G/DL (ref 28.4–34.8)
MCV RBC AUTO: 100.6 FL (ref 82.6–102.9)
MONOCYTES NFR BLD: 1.27 K/UL (ref 0.1–1.2)
MONOCYTES NFR BLD: 6 % (ref 3–12)
NEUTROPHILS NFR BLD: 70 % (ref 36–65)
NEUTS SEG NFR BLD: 14.51 K/UL (ref 1.5–8.1)
NRBC BLD-RTO: 0 PER 100 WBC
PLATELET # BLD AUTO: 609 K/UL (ref 138–453)
PMV BLD AUTO: 8.4 FL (ref 8.1–13.5)
POTASSIUM SERPL-SCNC: 4 MMOL/L (ref 3.7–5.3)
PROT SERPL-MCNC: 8.2 G/DL (ref 6.6–8.7)
RBC # BLD AUTO: 4.71 M/UL (ref 3.95–5.11)
RBC # BLD: ABNORMAL 10*6/UL
SODIUM SERPL-SCNC: 134 MMOL/L (ref 136–145)
WBC OTHER # BLD: 20.5 K/UL (ref 3.5–11.3)

## 2025-06-14 PROCEDURE — 93005 ELECTROCARDIOGRAM TRACING: CPT | Performed by: STUDENT IN AN ORGANIZED HEALTH CARE EDUCATION/TRAINING PROGRAM

## 2025-06-14 PROCEDURE — 80053 COMPREHEN METABOLIC PANEL: CPT

## 2025-06-14 PROCEDURE — 82947 ASSAY GLUCOSE BLOOD QUANT: CPT

## 2025-06-14 PROCEDURE — 99285 EMERGENCY DEPT VISIT HI MDM: CPT

## 2025-06-14 PROCEDURE — 83735 ASSAY OF MAGNESIUM: CPT

## 2025-06-14 PROCEDURE — 85025 COMPLETE CBC W/AUTO DIFF WBC: CPT

## 2025-06-14 PROCEDURE — 99284 EMERGENCY DEPT VISIT MOD MDM: CPT

## 2025-06-14 PROCEDURE — 6370000000 HC RX 637 (ALT 250 FOR IP)

## 2025-06-14 PROCEDURE — 84703 CHORIONIC GONADOTROPIN ASSAY: CPT

## 2025-06-14 RX ORDER — HYDROCORTISONE 10 MG/1
5 TABLET ORAL DAILY
Qty: 15 TABLET | Refills: 0 | Status: SHIPPED | OUTPATIENT
Start: 2025-06-14

## 2025-06-14 RX ORDER — HYDROCORTISONE 10 MG/1
10 TABLET ORAL ONCE
Status: COMPLETED | OUTPATIENT
Start: 2025-06-14 | End: 2025-06-14

## 2025-06-14 RX ORDER — FLUDROCORTISONE ACETATE 0.1 MG/1
0.2 TABLET ORAL ONCE
Status: COMPLETED | OUTPATIENT
Start: 2025-06-14 | End: 2025-06-14

## 2025-06-14 RX ORDER — FLUDROCORTISONE ACETATE 0.1 MG/1
0.1 TABLET ORAL DAILY
Qty: 30 TABLET | Refills: 0 | Status: SHIPPED | OUTPATIENT
Start: 2025-06-14 | End: 2025-07-14

## 2025-06-14 RX ADMIN — FLUDROCORTISONE ACETATE 0.2 MG: 0.1 TABLET ORAL at 23:05

## 2025-06-14 RX ADMIN — HYDROCORTISONE 10 MG: 10 TABLET ORAL at 23:05

## 2025-06-14 SDOH — ECONOMIC STABILITY - HOUSING INSECURITY: HOMELESSNESS UNSPECIFIED: Z59.00

## 2025-06-14 ASSESSMENT — ENCOUNTER SYMPTOMS
VOMITING: 0
NAUSEA: 0
ABDOMINAL PAIN: 0
SHORTNESS OF BREATH: 0
COUGH: 0

## 2025-06-14 ASSESSMENT — PAIN - FUNCTIONAL ASSESSMENT
PAIN_FUNCTIONAL_ASSESSMENT: 0-10
PAIN_FUNCTIONAL_ASSESSMENT: 0-10

## 2025-06-14 ASSESSMENT — PAIN DESCRIPTION - ORIENTATION: ORIENTATION: LOWER

## 2025-06-14 ASSESSMENT — PAIN SCALES - GENERAL
PAINLEVEL_OUTOF10: 3
PAINLEVEL_OUTOF10: 3

## 2025-06-14 ASSESSMENT — PAIN DESCRIPTION - LOCATION
LOCATION: BACK
LOCATION: BACK

## 2025-06-14 ASSESSMENT — PAIN DESCRIPTION - PAIN TYPE: TYPE: ACUTE PAIN

## 2025-06-14 ASSESSMENT — LIFESTYLE VARIABLES: HOW OFTEN DO YOU HAVE A DRINK CONTAINING ALCOHOL: NEVER

## 2025-06-14 NOTE — DISCHARGE INSTRUCTIONS
You have been referred to a neurologist for follow-up, call listed number to schedule an appointment.  Also be sure to follow-up with your primary care provider.  Return to emergency department for any acute concerns including repeated seizures, weakness, numbness, fever.

## 2025-06-14 NOTE — ED PROVIDER NOTES
[facility] EMERGENCY DEPARTMENT  Emergency Department Encounter  Emergency Medicine Resident     Pt Name:Yaritza Esquivel  MRN: [id number]  Birthdate 1975  Date of evaluation: 6/14/25  PCP:  Otis Acosta MD  Note Started: 10:08 AM EDT      CHIEF COMPLAINT       Chief Complaint   Patient presents with    Seizures       HISTORY OF PRESENT ILLNESS  (Location/Symptom, Timing/Onset, Context/Setting, Quality, Duration, Modifying Factors, Severity.)      Yaritza Esquivel is a 49 y.o. female who presents with past for possible seizure activity.  Patient is homeless, was outside sitting in grass while in the rain, very cold on arrival.  States she had a couple minutes worth of repetitive convulsive movements in her extremities, states she was fully awake and aware throughout this, no postictal period.  Reports history of seizure disorder in the past, however has not had a seizure in over 10 years and has not been on antiepileptic medication for many years, previously on Topamax.  States currently she is essentially asymptomatic besides feeling cold.  Denies hitting head or loss of consciousness.  Denies recent trauma.  Denies headache, weakness, numbness, chest pain, palpitations, shortness of breath, cough, abdominal pain, nausea, vomiting.  Denies alcohol, tobacco, drug use.  Reports compliance with home medications.    PAST MEDICAL / SURGICAL / SOCIAL / FAMILY HISTORY      has a past medical history of Adrenal insufficiency, Depression, Diabetes mellitus type 2, diet-controlled (HCC), Headache, Hypothyroidism, and MEN (multiple endocrine neoplasia) (HCC).       has a past surgical history that includes Thyroidectomy; Tonsillectomy; and Total hip arthroplasty (Right).      Social History     Socioeconomic History    Marital status: Single     Spouse name: Not on file    Number of children: Not on file    Years of education: Not on file    Highest education level: Not on file   Occupational History    Not on

## 2025-06-14 NOTE — ED NOTES
Patient presents to ED via EMS from outside for evaluation s/p possible seizure. Patient recalls having episode of shaking, patient remembers the episode occurring. Patient arrives soaked in water head to toe because it has been raining all night. Patient is homeless and has been outside all night long. Patient reports hx seizures and states she has not been on medications for it since 2012. Patient denies chest pain, shortness of breath, dizziness, headache, any other symptoms.  Patient is alert and oriented x4, answering questions appropriately. Respirations even and unlabored. Patient changed into gown, placed on full cardiac monitor, BP cuff, and pulse ox. EKG completed. IV established. Call light within reach. Will continue to monitor.

## 2025-06-14 NOTE — ED PROVIDER NOTES
[facility]     Emergency Department     Faculty Attestation    I performed a history and physical examination of the patient and discussed management with the resident. I have reviewed and agree with the resident’s findings including all diagnostic interpretations, and treatment plans as written at the time of my review. Any areas of disagreement are noted on the chart. I was personally present for the key portions of any procedures. I have documented in the chart those procedures where I was not present during the key portions. For Physician Assistant/ Nurse Practitioner cases/documentation I have personally evaluated this patient and have completed at least one if not all key elements of the E/M (history, physical exam, and MDM). Additional findings are as noted.    PtName: Yaritza Esquivel  MRN: [id number]  Birthdate 1975  Date of evaluation: 6/14/25  Note Started: 10:09 AM EDT    Primary Care Physician: Otis Acosta MD    Brief HPI:  Patient is a 49-year-old female presents emergency department with possible seizure-like activity.  The patient admits to a history of seizures.  She reports that earlier today she had an episode in which she was conscious and noticed uncontrollable shaking of her bilateral upper extremities.  The patient was outside overnight.  She has a history of adrenal insufficiency she reports taking her medications as prescribed.  She does not take any antiepileptic medications.  Denies any symptoms at this time    Pertinent Physical Exam Findings:  Vitals:    06/14/25 1004   BP: (!) 146/132   Pulse:    Resp:    Temp:    SpO2:    Appears well, resting comfortably, no acute distress, GCS is 15, alert and oriented x 4, grossly nonfocal neurologic exam    Medical Decision Making: Patient is a 49 y.o. female presenting to the emergency department with possible seizure-like activity. The chart was reviewed for pertinent history relating to the

## 2025-06-15 LAB
EKG ATRIAL RATE: 87 BPM
EKG P AXIS: 62 DEGREES
EKG P-R INTERVAL: 148 MS
EKG Q-T INTERVAL: 344 MS
EKG QRS DURATION: 70 MS
EKG QTC CALCULATION (BAZETT): 413 MS
EKG R AXIS: -17 DEGREES
EKG T AXIS: 97 DEGREES
EKG VENTRICULAR RATE: 87 BPM

## 2025-06-15 PROCEDURE — 93010 ELECTROCARDIOGRAM REPORT: CPT | Performed by: INTERNAL MEDICINE

## 2025-06-15 NOTE — ED NOTES
Writer contacted [facility] to provide them notice patient will be sent over via cab.   Ascencionr spoke with staff member Remedios. She stated she would update night shift staff during report.

## 2025-06-15 NOTE — DISCHARGE INSTRUCTIONS
You were seen in the emergency department for suicidal ideation.  You  were provided resources by the .    You do also have a history of adrenal insufficiency following adrenalectomy.  You must take Cortef [hydrocortisone] 5 mg every day.  You must also take fludrocortisone [Florinef] 0.1 mg every day.  If you do not take this, you may go into an addisonian crisis.  This is a potentially fatal condition where your blood pressure drops and you become comatose.    Return to the emergency department if you have worsening pain, fever, weakness, fatigue, or any other acute medical concern.    Schedule an appointment to be seen by your primary care doctor soon as possible.  If you do not have a primary care doctor, schedule an appointment to be seen by the [facility] at [address], Telephone:  [phone].

## 2025-06-15 NOTE — ED PROVIDER NOTES
[facility] ED  Emergency Department Encounter  Emergency Medicine Resident     Pt Name:Yaritza Esquivel  MRN: [id number]  Birthdate 1975  Date of evaluation: 6/14/25  PCP:  Otis Acosta MD  Note Started: 10:18 PM EDT      CHIEF COMPLAINT       Chief Complaint   Patient presents with    Suicidal    Homeless       HISTORY OF PRESENT ILLNESS  (Location/Symptom, Timing/Onset, Context/Setting, Quality, Duration, Modifying Factors, Severity.)      Yaritza Esquivel is a 49 y.o. female who presents with suicidal ideation.  Patient states that she was just told that a good friend passed away a couple of hours ago.    She states her plan is to overdose on her medications, likely trazodone she states.    She denies having done anything to harm herself.  Denies having ingested anything, denies any wounds, denies any trauma.    She does have a history of adrenal insufficiency secondary to bilateral adenectomy, she states she has been taking her hydrocortisone and her fludrocortisone as prescribed.  She states that she does still have them.    She does have an extensive history of homelessness, was actually just seen here earlier today for seizure-like activity and homelessness.  Did have labs drawn then, largely unremarkable.  She states she did not mention her suicidality at the time that she was not suicidal.    PAST MEDICAL / SURGICAL / SOCIAL / FAMILY HISTORY      has a past medical history of Adrenal insufficiency, Depression, Diabetes mellitus type 2, diet-controlled (HCC), Headache, Hypothyroidism, and MEN (multiple endocrine neoplasia) (HCC).     has a past surgical history that includes Thyroidectomy; Tonsillectomy; and Total hip arthroplasty (Right).    Social History     Socioeconomic History    Marital status: Single     Spouse name: Not on file    Number of children: Not on file    Years of education: Not on file    Highest education level: Not on file   Occupational History    Not on file 
vomiting.  She has sleep disturbance and mood disorder.  She is on Effexor and trazodone for her depression.  Past history is negative for known seizure disorder but positive for adrenal insufficiency, hypothyroidism, she is on Florinef and hydrocortisone 5 mg.  On exam she has flat affect, vital signs normal.  Mouth is slightly dry.  Normal motor strength.  Impression is depression, poor appetite, possible seizure-like activity, adrenal insufficiency and hypothyroidism.            Camron Juan MD, FACEP  Attending Emergency  Physician                Camron Juan MD  06/14/25 1915

## 2025-06-15 NOTE — ED NOTES
Pt in Room 9. Pt has been in the lobby all day. She states she is looking for respite care and can not go back to [facility] due to having gotten in a fight with their staff. Pt states that she had a friend pass away and is feeling depressed. She states she has been in connection with [facility]. SW notified and is at the bedside.

## 2025-06-15 NOTE — ED NOTES
Patient is 49 year old female presented to ed after sitting in the lobby for over 10 hours. Patient was asked to leave. Patient contacted insurance and called for a ride to [facility] stating she wanted to see if they could give her respite and rest as it won't hurt to try and get in at [facility].   Patient reported vague statements about suicidal ideations due to having no where to go. Patient and writer discussed patient sitting in [employer] just fine until she was asked to leave. Patient reported feeling depressed, and her friend past away today. Patient report she is med compliant. She is linked to [facility] and has a  who is helping her seek housing. Patient banned from [facility] due to altercation with staff reporting staff has picks.   Patient admits to receiving over $900 in social security benefits spending on what she needs.   Patient and writer discussed other low income options.   Writer discussed with patient, sending her to [facility] for an assessment and patient is agreeable.   Writer will cab patient. Patient admits to stating she is suicidal due to not having anywhere to go.   Patient wanted writer to call [facility] and guarantee her a bed upon assessment.

## 2025-06-16 ENCOUNTER — HOSPITAL ENCOUNTER (EMERGENCY)
Age: 50
Discharge: HOME | End: 2025-06-17
Attending: EMERGENCY MEDICINE
Payer: COMMERCIAL

## 2025-06-16 DIAGNOSIS — F32.A DEPRESSION WITH SUICIDAL IDEATION: Primary | ICD-10-CM

## 2025-06-16 DIAGNOSIS — R45.851 DEPRESSION WITH SUICIDAL IDEATION: Primary | ICD-10-CM

## 2025-06-16 PROCEDURE — 99285 EMERGENCY DEPT VISIT HI MDM: CPT

## 2025-06-16 ASSESSMENT — PAIN - FUNCTIONAL ASSESSMENT: PAIN_FUNCTIONAL_ASSESSMENT: NONE - DENIES PAIN

## 2025-06-16 ASSESSMENT — ENCOUNTER SYMPTOMS
SHORTNESS OF BREATH: 0
BACK PAIN: 0
COLOR CHANGE: 0
ABDOMINAL PAIN: 0
EYE PAIN: 0

## 2025-06-16 NOTE — ED NOTES
Provisional Diagnosis:   Depression with suicidal ideation     Psychosocial and Contextual Factors: Pt is homeless.     C-SSRS Summary:    Patient: X    Family:     Agency: X (EPIC)    Present Suicidal Behavior:     Verbal: X    Attempt:       Past Suicidal Behavior:     Verbal: X    Attempt: X    Self- Injurious/ Self-Mutilation:  Pt denies    Trauma History: None reported at this time.    Protective Factors: Pt has insurance.     Risk Factors: Pt has poor judgement and coping skills. Non compliance with treatment. Frequent hospitalizations.     Substance Abuse: Pt denies    Clinical Summary:  Yaritza Esquivel is a 49 year old female who presents to the ED via self. Pt is suicidal with a plan to jump [place] of the [place]. Pt states pt has been feeling \"really suicidal.\" Pt states pt is under a lot of stress and been going through a lot. Pt unable to provide details to pt's recent stressors. Pt denies HI/AH/VH. Pt states pt has attempted suicide in the past. Pt states pt has been compliant taking pt's medications. Pt states pt's therapist at [facility] dropped pt as a client yesterday. Pt was admitted to the [facility] 3/21/25- 4/1/25, 4/8/25- 4/23/25, and 4/28/25-5/5/25. Pt frequents the ED for chronic depression. Pt has had multiple inpatient psychiatric admissions.     Level of Care Disposition:. MANDI consulted with NICK Paez from psychiatry. Pt declined an admission to the [facility] at this time as pt does not benefit from hospitalization.       MANDI attempted to safety plan with pt. Pt stating pt cannot contract for pt's safety leaving the ED and going to [facility] Crisis. Pt states \"I will jump off of the bridge if I leave here.\"     Psych consult arranged, per request of ED

## 2025-06-16 NOTE — ED PROVIDER NOTES
EMERGENCY DEPARTMENT ENCOUNTER    Pt Name: Yaritza Esquivel  MRN: [id number]  Birthdate 1975  Date of evaluation: 6/16/25  CHIEF COMPLAINT       Chief Complaint   Patient presents with    Mental Health Problem     Pt brought in by [employer] on voluntary, pt states having thoughts of SI, would OD or jump off a bridge     HISTORY OF PRESENT ILLNESS   49-year-old female presents for mental health evaluation.  Patient brought in by police after she reportedly called with told him that she was suicidal.  Patient states that there is been multiple stressors in her life making her feel suicidal states that she had plan to overdose on her meds or jump off the bridge.  She denies homicidal ideation denies visual or auditory hallucinations denies recent drug or alcohol use denies current medical complaints    The history is provided by the patient.           REVIEW OF SYSTEMS     Review of Systems   Constitutional:  Negative for fever.   HENT:  Negative for congestion and ear pain.    Eyes:  Negative for pain.   Respiratory:  Negative for shortness of breath.    Cardiovascular:  Negative for chest pain, palpitations and leg swelling.   Gastrointestinal:  Negative for abdominal pain.   Genitourinary:  Negative for dysuria and flank pain.   Musculoskeletal:  Negative for back pain.   Skin:  Negative for color change.   Neurological:  Negative for numbness and headaches.   Psychiatric/Behavioral:  Positive for suicidal ideas. Negative for confusion.    All other systems reviewed and are negative.    PASTMEDICAL HISTORY     Past Medical History:   Diagnosis Date    Adrenal insufficiency     Depression     Diabetes mellitus type 2, diet-controlled ([facility])     Headache     Hypothyroidism     MEN (multiple endocrine neoplasia) ([facility])      Past Problem List  Patient Active Problem List   Diagnosis Code    Hypothyroidism E03.9    Schizophrenia ([facility]) F20.9    Bipolar depression ([facility]) F31.9    Depression F32.A    Depression with suicidal ideation

## 2025-06-17 VITALS
SYSTOLIC BLOOD PRESSURE: 104 MMHG | TEMPERATURE: 97.8 F | HEART RATE: 86 BPM | OXYGEN SATURATION: 99 % | DIASTOLIC BLOOD PRESSURE: 42 MMHG | WEIGHT: 205 LBS | RESPIRATION RATE: 15 BRPM | BODY MASS INDEX: 29.35 KG/M2 | HEIGHT: 70 IN

## 2025-06-17 ASSESSMENT — PAIN - FUNCTIONAL ASSESSMENT: PAIN_FUNCTIONAL_ASSESSMENT: NONE - DENIES PAIN

## 2025-06-17 NOTE — ED NOTES
Patient assessed by Psychiatrist. Patient agreeable to go to [facility].     Patient will be cabbed to [facility]. Patient agreeable to discharge plan. Patient escorted out of Emergency Room with no issues.      provided patient with brief patient prevention education interventions including follow-up outpatient treatment resources & recommendations, and lethal means counseling. Writer and patient discussed safety planning consisting of resources patient can use during or before a mental health crisis. Patient given local community information on homeless shelters, clothing sites, and hot meal sites.      The following service(s) is/are recommended for behavioral health follow-up:  Contact [facility] Crisis Care line [phone] any time for support.  Medications: Take Medications as prescribed. Let your physician know if you have any concerns.  Recovery Help line for assistance with linkage to mental health and substance use services. Call 211.  Return to Emergency Department if you have any further medical concerns.   Patient given National suicide prevention line at 1-251.328.4244

## 2025-06-19 ENCOUNTER — TELEPHONE (OUTPATIENT)
Dept: NEUROLOGY | Age: 50
End: 2025-06-19

## 2025-06-19 NOTE — TELEPHONE ENCOUNTER
06 19 2025 called the patient  at  to schedule new patient appointment with one of our providers, received a referral from the ER, the number had calling restrictions so the call could not be completed.  I mailed the patient a letter asking them to call the office back to schedule this appointment.  KS

## 2025-06-22 ENCOUNTER — HOSPITAL ENCOUNTER (EMERGENCY)
Age: 50
Discharge: HOME | End: 2025-06-22
Attending: EMERGENCY MEDICINE
Payer: COMMERCIAL

## 2025-06-22 VITALS
OXYGEN SATURATION: 97 % | RESPIRATION RATE: 18 BRPM | TEMPERATURE: 98.6 F | SYSTOLIC BLOOD PRESSURE: 136 MMHG | HEART RATE: 87 BPM | DIASTOLIC BLOOD PRESSURE: 65 MMHG

## 2025-06-22 DIAGNOSIS — B85.0 HEAD LICE INFESTATION: Primary | ICD-10-CM

## 2025-06-22 PROCEDURE — 99283 EMERGENCY DEPT VISIT LOW MDM: CPT

## 2025-06-22 NOTE — ED PROVIDER NOTES
[facility] EMERGENCY DEPARTMENT     Emergency Department     Faculty Attestation    I performed a history and physical examination of the patient and discussed management with the resident. I reviewed the resident’s note and agree with the documented findings and plan of care. Any areas of disagreement are noted on the chart. I was personally present for the key portions of any procedures. I have documented in the chart those procedures where I was not present during the key portions. I have reviewed the emergency nurses triage note. I agree with the chief complaint, past medical history, past surgical history, allergies, medications, social and family history as documented unless otherwise noted below. For Physician Assistant/ Nurse Practitioner cases/documentation I have personally evaluated this patient and have completed at least one if not all key elements of the E/M (history, physical exam, and MDM). Additional findings are as noted.    Note Started: 1:47 PM EDT    Patient here concern for head lice.  Confirmed on exam we will treat      Critical Care     none    Camron Brito MD, FACEP  Attending Emergency  Physician           Camron Brito MD  06/22/25 1930

## 2025-06-22 NOTE — ED NOTES
Pt arrives to ED 45 via triage.   Pt co head lice.   Pt states that she has been around people that have had lice at [facility].   Pt states that she has been itchy, but has only been itchy on her L hand today.   Pt states that [facility]'t treat her lice so she wanted to come and get treatment.   Pt respirations are even and unlabored, pt is alert and oriented X 4, speaking in complete sentences, bed is in the lowest position, call light is within reach, NAD noted.   Will continue to follow plan of care.

## 2025-06-22 NOTE — ED PROVIDER NOTES
[facility] EMERGENCY DEPARTMENT  Emergency Department Encounter  Emergency Medicine Resident     Pt Name:Yaritza Esquivel  MRN: [id number]  Birthdate 1975  Date of evaluation: 6/22/25  PCP:  Otis Acosta MD  Note Started: 1:50 PM EDT      CHIEF COMPLAINT       Chief Complaint   Patient presents with    Head Lice       HISTORY OF PRESENT ILLNESS  (Location/Symptom, Timing/Onset, Context/Setting, Quality, Duration, Modifying Factors, Severity.)      Yaritza Esquivel is a 49 y.o. female who presents with concern for head lice.  Patient states [facility].  She states she has had lice before when her kids brought them home from school.  She does not suspect this concern at this time.    PAST MEDICAL / SURGICAL / SOCIAL / FAMILY HISTORY      has a past medical history of Adrenal insufficiency, Depression, Diabetes mellitus type 2, diet-controlled (HCC), Headache, Hypothyroidism, and MEN (multiple endocrine neoplasia) (HCC).       has a past surgical history that includes Thyroidectomy; Tonsillectomy; and Total hip arthroplasty (Right).      Social History     Socioeconomic History    Marital status: Single     Spouse name: Not on file    Number of children: Not on file    Years of education: Not on file    Highest education level: Not on file   Occupational History    Not on file   Tobacco Use    Smoking status: Some Days     Current packs/day: 0.50     Average packs/day: 0.5 packs/day for 28.0 years (14.0 ttl pk-yrs)     Types: Cigarettes    Smokeless tobacco: Never   Vaping Use    Vaping status: Every Day   Substance and Sexual Activity    Alcohol use: Not Currently     Comment: Sober since 2010    Drug use: Not Currently     Types: Cocaine, Marijuana (Weed)     Comment: Sober since October 2018    Sexual activity: Not Currently   Other Topics Concern    Not on file   Social History Narrative    Not on file     Social Drivers of Health     Financial

## 2025-06-22 NOTE — DISCHARGE INSTRUCTIONS
Thank you for visiting [facility] Emergency Department.    You need to call Otis Acosta MD to make an appointment as directed for follow up.    Should you have any questions regarding your care or further treatment, please call [facility] Emergency Department at [phone].    Use the lice shampoo again in 7 days if the first treatment does not solve the infection.

## 2025-07-08 ENCOUNTER — APPOINTMENT (OUTPATIENT)
Dept: CT IMAGING | Age: 50
End: 2025-07-08
Payer: COMMERCIAL

## 2025-07-08 ENCOUNTER — HOSPITAL ENCOUNTER (INPATIENT)
Age: 50
LOS: 2 days | Discharge: HOME | End: 2025-07-11
Attending: EMERGENCY MEDICINE | Admitting: STUDENT IN AN ORGANIZED HEALTH CARE EDUCATION/TRAINING PROGRAM
Payer: COMMERCIAL

## 2025-07-08 DIAGNOSIS — R56.9 SEIZURE (HCC): Primary | ICD-10-CM

## 2025-07-08 DIAGNOSIS — E03.9 HYPOTHYROIDISM IN ADULT: ICD-10-CM

## 2025-07-08 DIAGNOSIS — E03.9 HYPOTHYROIDISM, UNSPECIFIED TYPE: ICD-10-CM

## 2025-07-08 LAB
ALBUMIN SERPL-MCNC: 3.9 G/DL (ref 3.5–5.2)
ALBUMIN/GLOB SERPL: 1.2 {RATIO} (ref 1–2.5)
ALP SERPL-CCNC: 120 U/L (ref 35–104)
ALT SERPL-CCNC: 20 U/L (ref 10–35)
ANION GAP SERPL CALCULATED.3IONS-SCNC: 15 MMOL/L (ref 9–16)
AST SERPL-CCNC: 30 U/L (ref 10–35)
BASOPHILS # BLD: 0.05 K/UL (ref 0–0.2)
BASOPHILS NFR BLD: 1 % (ref 0–2)
BILIRUB SERPL-MCNC: <0.2 MG/DL (ref 0–1.2)
BILIRUB UR QL STRIP: NEGATIVE
BUN SERPL-MCNC: 15 MG/DL (ref 6–20)
CALCIUM SERPL-MCNC: 10.6 MG/DL (ref 8.6–10.4)
CHLORIDE SERPL-SCNC: 101 MMOL/L (ref 98–107)
CLARITY UR: CLEAR
CO2 SERPL-SCNC: 21 MMOL/L (ref 20–31)
COLOR UR: YELLOW
COMMENT: NORMAL
CREAT SERPL-MCNC: 0.9 MG/DL (ref 0.6–0.9)
EOSINOPHIL # BLD: 0.36 K/UL (ref 0–0.44)
EOSINOPHILS RELATIVE PERCENT: 4 % (ref 1–4)
ERYTHROCYTE [DISTWIDTH] IN BLOOD BY AUTOMATED COUNT: 13.7 % (ref 11.8–14.4)
GFR, ESTIMATED: 78 ML/MIN/1.73M2
GLUCOSE SERPL-MCNC: 98 MG/DL (ref 74–99)
GLUCOSE UR STRIP-MCNC: NEGATIVE MG/DL
HCG SERPL QL: NEGATIVE
HCT VFR BLD AUTO: 36.3 % (ref 36.3–47.1)
HGB BLD-MCNC: 11.7 G/DL (ref 11.9–15.1)
HGB UR QL STRIP.AUTO: NEGATIVE
IMM GRANULOCYTES # BLD AUTO: 0.06 K/UL (ref 0–0.3)
IMM GRANULOCYTES NFR BLD: 1 %
KETONES UR STRIP-MCNC: NEGATIVE MG/DL
LEUKOCYTE ESTERASE UR QL STRIP: NEGATIVE
LIPASE SERPL-CCNC: 46 U/L (ref 13–60)
LYMPHOCYTES NFR BLD: 2.49 K/UL (ref 1.1–3.7)
LYMPHOCYTES RELATIVE PERCENT: 28 % (ref 24–43)
MCH RBC QN AUTO: 29.2 PG (ref 25.2–33.5)
MCHC RBC AUTO-ENTMCNC: 32.2 G/DL (ref 28.4–34.8)
MCV RBC AUTO: 90.5 FL (ref 82.6–102.9)
MONOCYTES NFR BLD: 0.48 K/UL (ref 0.1–1.2)
MONOCYTES NFR BLD: 5 % (ref 3–12)
NEUTROPHILS NFR BLD: 61 % (ref 36–65)
NEUTS SEG NFR BLD: 5.5 K/UL (ref 1.5–8.1)
NITRITE UR QL STRIP: NEGATIVE
NRBC BLD-RTO: 0 PER 100 WBC
PH UR STRIP: 6.5 [PH] (ref 5–8)
PLATELET # BLD AUTO: 270 K/UL (ref 138–453)
PMV BLD AUTO: 9.5 FL (ref 8.1–13.5)
POTASSIUM SERPL-SCNC: 4.5 MMOL/L (ref 3.7–5.3)
PROT SERPL-MCNC: 7.1 G/DL (ref 6.6–8.7)
PROT UR STRIP-MCNC: NEGATIVE MG/DL
RBC # BLD AUTO: 4.01 M/UL (ref 3.95–5.11)
SODIUM SERPL-SCNC: 137 MMOL/L (ref 136–145)
SP GR UR STRIP: 1.01 (ref 1–1.03)
T4 FREE SERPL-MCNC: 0.4 NG/DL (ref 0.92–1.68)
TSH SERPL DL<=0.05 MIU/L-ACNC: 108 UIU/ML (ref 0.27–4.2)
UROBILINOGEN UR STRIP-ACNC: NORMAL EU/DL (ref 0–1)
WBC OTHER # BLD: 8.9 K/UL (ref 3.5–11.3)

## 2025-07-08 PROCEDURE — 84703 CHORIONIC GONADOTROPIN ASSAY: CPT

## 2025-07-08 PROCEDURE — 84439 ASSAY OF FREE THYROXINE: CPT

## 2025-07-08 PROCEDURE — 81003 URINALYSIS AUTO W/O SCOPE: CPT

## 2025-07-08 PROCEDURE — 80053 COMPREHEN METABOLIC PANEL: CPT

## 2025-07-08 PROCEDURE — 99285 EMERGENCY DEPT VISIT HI MDM: CPT

## 2025-07-08 PROCEDURE — 84443 ASSAY THYROID STIM HORMONE: CPT

## 2025-07-08 PROCEDURE — 93005 ELECTROCARDIOGRAM TRACING: CPT

## 2025-07-08 PROCEDURE — 70450 CT HEAD/BRAIN W/O DYE: CPT

## 2025-07-08 PROCEDURE — 85025 COMPLETE CBC W/AUTO DIFF WBC: CPT

## 2025-07-08 PROCEDURE — 83690 ASSAY OF LIPASE: CPT

## 2025-07-08 ASSESSMENT — PAIN - FUNCTIONAL ASSESSMENT: PAIN_FUNCTIONAL_ASSESSMENT: 0-10

## 2025-07-08 ASSESSMENT — PAIN SCALES - GENERAL: PAINLEVEL_OUTOF10: 1

## 2025-07-08 ASSESSMENT — PAIN DESCRIPTION - PAIN TYPE: TYPE: ACUTE PAIN

## 2025-07-08 ASSESSMENT — PAIN DESCRIPTION - LOCATION: LOCATION: THROAT

## 2025-07-08 ASSESSMENT — PAIN DESCRIPTION - FREQUENCY: FREQUENCY: CONTINUOUS

## 2025-07-08 NOTE — ED PROVIDER NOTES
[facility]     Emergency Department     Faculty Attestation    I performed a history and physical examination of the patient and discussed management with the resident. I have reviewed and agree with the resident’s findings including all diagnostic interpretations, and treatment plans as written at the time of my review. Any areas of disagreement are noted on the chart. I was personally present for the key portions of any procedures. I have documented in the chart those procedures where I was not present during the key portions. For Physician Assistant/ Nurse Practitioner cases/documentation I have personally evaluated this patient and have completed at least one if not all key elements of the E/M (history, physical exam, and MDM). Additional findings are as noted.    PtName: Yaritza Esquivel  MRN: [id number]  Birthdate 1975  Date of evaluation: 7/8/25  Note Started: 7:03 PM EDT    Primary Care Physician: Otis Acosta MD        History: This is a 50 y.o. female who presents to the Emergency Department with complaint of seizure.  Patient was had spouse noticed when she felt very hot nauseated with some chest pain.  As you recall she is on the floor in a pool of urine.  Patient has a history of prior seizures but states she has not had seizures in a \"very long time\".  She is no longer taking her previous seizure medication.    Physical:   weight is 83.9 kg (185 lb). Her oral temperature is 97.9 °F (36.6 °C). Her blood pressure is 108/87 and her pulse is 83. Her respiration is 17 and oxygen saturation is 100%.  Patient is awake alert.  She moves all extremities well.  She has no facial asymmetry.  Heart is regular rate she is in no respiratory stress    Impression: Seizure    Plan: CT, EKG, CBC, BMP, troponin, TSH      EKG Interpretation    Interpreted by me  Normal sinus rhythm ventricular of 84, normal RI interval, normal QRS duration, low voltage QRS, normal

## 2025-07-08 NOTE — ED NOTES
Pt to ED by ems for witnessed seizure like activity by an employee at the [facility]. Per ems pt had an approx 30 second \"staring spell\" with no immediate post ictal period noted. Pt was incontinent of urine after the episode and A&O x4. Pt states that she has not seen a neurologist in 2 years and has not had any of her home meds in almost 3 weeks. Pt ambulatory on arrival to room.

## 2025-07-09 ENCOUNTER — APPOINTMENT (OUTPATIENT)
Dept: MRI IMAGING | Age: 50
End: 2025-07-09
Payer: COMMERCIAL

## 2025-07-09 PROBLEM — R56.9 SEIZURE (HCC): Status: ACTIVE | Noted: 2025-07-09

## 2025-07-09 PROBLEM — E03.9 HYPOTHYROID: Status: ACTIVE | Noted: 2025-07-09

## 2025-07-09 LAB
ANION GAP SERPL CALCULATED.3IONS-SCNC: 11 MMOL/L (ref 9–16)
BUN SERPL-MCNC: 13 MG/DL (ref 6–20)
CALCIUM SERPL-MCNC: 10.3 MG/DL (ref 8.6–10.4)
CHLORIDE SERPL-SCNC: 105 MMOL/L (ref 98–107)
CO2 SERPL-SCNC: 22 MMOL/L (ref 20–31)
CREAT SERPL-MCNC: 0.8 MG/DL (ref 0.6–0.9)
GFR, ESTIMATED: 90 ML/MIN/1.73M2
GLUCOSE SERPL-MCNC: 117 MG/DL (ref 74–99)
POTASSIUM SERPL-SCNC: 4.5 MMOL/L (ref 3.7–5.3)
SODIUM SERPL-SCNC: 138 MMOL/L (ref 136–145)
T4 FREE SERPL-MCNC: 0.6 NG/DL (ref 0.9–1.7)
TSH SERPL DL<=0.05 MIU/L-ACNC: 39.6 UIU/ML (ref 0.27–4.2)

## 2025-07-09 PROCEDURE — 2500000003 HC RX 250 WO HCPCS: Performed by: NURSE PRACTITIONER

## 2025-07-09 PROCEDURE — 6360000002 HC RX W HCPCS: Performed by: INTERNAL MEDICINE

## 2025-07-09 PROCEDURE — 6370000000 HC RX 637 (ALT 250 FOR IP): Performed by: INTERNAL MEDICINE

## 2025-07-09 PROCEDURE — 2060000000 HC ICU INTERMEDIATE R&B

## 2025-07-09 PROCEDURE — 84443 ASSAY THYROID STIM HORMONE: CPT

## 2025-07-09 PROCEDURE — 99254 IP/OBS CNSLTJ NEW/EST MOD 60: CPT | Performed by: PSYCHIATRY & NEUROLOGY

## 2025-07-09 PROCEDURE — 6360000002 HC RX W HCPCS: Performed by: PSYCHIATRY & NEUROLOGY

## 2025-07-09 PROCEDURE — 6360000002 HC RX W HCPCS: Performed by: NURSE PRACTITIONER

## 2025-07-09 PROCEDURE — 99222 1ST HOSP IP/OBS MODERATE 55: CPT | Performed by: FAMILY MEDICINE

## 2025-07-09 PROCEDURE — 6370000000 HC RX 637 (ALT 250 FOR IP): Performed by: PSYCHIATRY & NEUROLOGY

## 2025-07-09 PROCEDURE — 2580000003 HC RX 258: Performed by: PSYCHIATRY & NEUROLOGY

## 2025-07-09 PROCEDURE — 95819 EEG AWAKE AND ASLEEP: CPT

## 2025-07-09 PROCEDURE — 2500000003 HC RX 250 WO HCPCS: Performed by: INTERNAL MEDICINE

## 2025-07-09 PROCEDURE — 2500000003 HC RX 250 WO HCPCS

## 2025-07-09 PROCEDURE — 84439 ASSAY OF FREE THYROXINE: CPT

## 2025-07-09 PROCEDURE — 95819 EEG AWAKE AND ASLEEP: CPT | Performed by: PSYCHIATRY & NEUROLOGY

## 2025-07-09 PROCEDURE — 6360000004 HC RX CONTRAST MEDICATION

## 2025-07-09 PROCEDURE — 80048 BASIC METABOLIC PNL TOTAL CA: CPT

## 2025-07-09 PROCEDURE — 70553 MRI BRAIN STEM W/O & W/DYE: CPT

## 2025-07-09 PROCEDURE — 6370000000 HC RX 637 (ALT 250 FOR IP)

## 2025-07-09 PROCEDURE — A9576 INJ PROHANCE MULTIPACK: HCPCS

## 2025-07-09 RX ORDER — SODIUM CHLORIDE 0.9 % (FLUSH) 0.9 %
10 SYRINGE (ML) INJECTION PRN
Status: DISCONTINUED | OUTPATIENT
Start: 2025-07-09 | End: 2025-07-11 | Stop reason: HOSPADM

## 2025-07-09 RX ORDER — ACETAMINOPHEN 650 MG/1
650 SUPPOSITORY RECTAL EVERY 6 HOURS PRN
Status: DISCONTINUED | OUTPATIENT
Start: 2025-07-09 | End: 2025-07-11 | Stop reason: HOSPADM

## 2025-07-09 RX ORDER — FLUDROCORTISONE ACETATE 0.1 MG/1
0.1 TABLET ORAL DAILY
Status: DISCONTINUED | OUTPATIENT
Start: 2025-07-09 | End: 2025-07-11 | Stop reason: HOSPADM

## 2025-07-09 RX ORDER — POTASSIUM CHLORIDE 1500 MG/1
40 TABLET, EXTENDED RELEASE ORAL PRN
Status: DISCONTINUED | OUTPATIENT
Start: 2025-07-09 | End: 2025-07-11 | Stop reason: HOSPADM

## 2025-07-09 RX ORDER — MULTIVITAMIN WITH IRON
1000 TABLET ORAL DAILY
Status: DISCONTINUED | OUTPATIENT
Start: 2025-07-09 | End: 2025-07-11 | Stop reason: HOSPADM

## 2025-07-09 RX ORDER — POLYETHYLENE GLYCOL 3350 17 G/17G
17 POWDER, FOR SOLUTION ORAL DAILY PRN
Status: DISCONTINUED | OUTPATIENT
Start: 2025-07-09 | End: 2025-07-11 | Stop reason: HOSPADM

## 2025-07-09 RX ORDER — TOPIRAMATE 25 MG/1
25 TABLET, FILM COATED ORAL 2 TIMES DAILY
Status: DISCONTINUED | OUTPATIENT
Start: 2025-07-09 | End: 2025-07-09

## 2025-07-09 RX ORDER — HYDROCORTISONE 5 MG/1
5 TABLET ORAL DAILY
Status: DISCONTINUED | OUTPATIENT
Start: 2025-07-09 | End: 2025-07-11 | Stop reason: HOSPADM

## 2025-07-09 RX ORDER — SODIUM CHLORIDE 0.9 % (FLUSH) 0.9 %
5-40 SYRINGE (ML) INJECTION EVERY 12 HOURS SCHEDULED
Status: DISCONTINUED | OUTPATIENT
Start: 2025-07-09 | End: 2025-07-11 | Stop reason: HOSPADM

## 2025-07-09 RX ORDER — SODIUM CHLORIDE 9 MG/ML
INJECTION, SOLUTION INTRAVENOUS PRN
Status: DISCONTINUED | OUTPATIENT
Start: 2025-07-09 | End: 2025-07-11 | Stop reason: HOSPADM

## 2025-07-09 RX ORDER — GADOTERIDOL 279.3 MG/ML
16 INJECTION INTRAVENOUS
Status: COMPLETED | OUTPATIENT
Start: 2025-07-09 | End: 2025-07-09

## 2025-07-09 RX ORDER — ACETAMINOPHEN 325 MG/1
650 TABLET ORAL EVERY 6 HOURS PRN
Status: DISCONTINUED | OUTPATIENT
Start: 2025-07-09 | End: 2025-07-11 | Stop reason: HOSPADM

## 2025-07-09 RX ORDER — CLOZAPINE 25 MG/1
50 TABLET ORAL NIGHTLY
Status: DISCONTINUED | OUTPATIENT
Start: 2025-07-09 | End: 2025-07-11 | Stop reason: HOSPADM

## 2025-07-09 RX ORDER — ONDANSETRON 4 MG/1
4 TABLET, ORALLY DISINTEGRATING ORAL EVERY 8 HOURS PRN
Status: DISCONTINUED | OUTPATIENT
Start: 2025-07-09 | End: 2025-07-11 | Stop reason: HOSPADM

## 2025-07-09 RX ORDER — LEVOTHYROXINE SODIUM 75 UG/1
150 TABLET ORAL DAILY
Status: DISCONTINUED | OUTPATIENT
Start: 2025-07-09 | End: 2025-07-11 | Stop reason: HOSPADM

## 2025-07-09 RX ORDER — HYDROCORTISONE SODIUM SUCCINATE 100 MG/2ML
50 INJECTION INTRAMUSCULAR; INTRAVENOUS EVERY 8 HOURS
Status: DISCONTINUED | OUTPATIENT
Start: 2025-07-09 | End: 2025-07-11 | Stop reason: HOSPADM

## 2025-07-09 RX ORDER — LIDOCAINE 4 G/G
1 PATCH TOPICAL DAILY
Status: DISCONTINUED | OUTPATIENT
Start: 2025-07-09 | End: 2025-07-11 | Stop reason: HOSPADM

## 2025-07-09 RX ORDER — LEVOTHYROXINE SODIUM ANHYDROUS 100 UG/5ML
100 INJECTION, POWDER, LYOPHILIZED, FOR SOLUTION INTRAVENOUS DAILY
Status: DISCONTINUED | OUTPATIENT
Start: 2025-07-09 | End: 2025-07-10

## 2025-07-09 RX ORDER — PANTOPRAZOLE SODIUM 40 MG/1
40 TABLET, DELAYED RELEASE ORAL
Status: DISCONTINUED | OUTPATIENT
Start: 2025-07-09 | End: 2025-07-11 | Stop reason: HOSPADM

## 2025-07-09 RX ORDER — SODIUM CHLORIDE 9 MG/ML
5 INJECTION, SOLUTION INTRAMUSCULAR; INTRAVENOUS; SUBCUTANEOUS DAILY
Status: DISCONTINUED | OUTPATIENT
Start: 2025-07-09 | End: 2025-07-10

## 2025-07-09 RX ORDER — FOLIC ACID 1 MG/1
1 TABLET ORAL DAILY
Status: DISCONTINUED | OUTPATIENT
Start: 2025-07-09 | End: 2025-07-11 | Stop reason: HOSPADM

## 2025-07-09 RX ORDER — SODIUM CHLORIDE 9 MG/ML
5 INJECTION, SOLUTION INTRAMUSCULAR; INTRAVENOUS; SUBCUTANEOUS ONCE
Status: DISCONTINUED | OUTPATIENT
Start: 2025-07-09 | End: 2025-07-09 | Stop reason: SDUPTHER

## 2025-07-09 RX ORDER — PHENYTOIN SODIUM 100 MG/1
300 CAPSULE, EXTENDED RELEASE ORAL DAILY
Status: DISCONTINUED | OUTPATIENT
Start: 2025-07-09 | End: 2025-07-11 | Stop reason: HOSPADM

## 2025-07-09 RX ORDER — ONDANSETRON 2 MG/ML
4 INJECTION INTRAMUSCULAR; INTRAVENOUS EVERY 6 HOURS PRN
Status: DISCONTINUED | OUTPATIENT
Start: 2025-07-09 | End: 2025-07-11 | Stop reason: HOSPADM

## 2025-07-09 RX ORDER — ENOXAPARIN SODIUM 100 MG/ML
40 INJECTION SUBCUTANEOUS DAILY
Status: DISCONTINUED | OUTPATIENT
Start: 2025-07-09 | End: 2025-07-11 | Stop reason: HOSPADM

## 2025-07-09 RX ORDER — POTASSIUM CHLORIDE 7.45 MG/ML
10 INJECTION INTRAVENOUS PRN
Status: DISCONTINUED | OUTPATIENT
Start: 2025-07-09 | End: 2025-07-11 | Stop reason: HOSPADM

## 2025-07-09 RX ORDER — LEVOTHYROXINE SODIUM ANHYDROUS 100 UG/5ML
100 INJECTION, POWDER, LYOPHILIZED, FOR SOLUTION INTRAVENOUS ONCE
Status: DISCONTINUED | OUTPATIENT
Start: 2025-07-09 | End: 2025-07-09 | Stop reason: SDUPTHER

## 2025-07-09 RX ORDER — MAGNESIUM SULFATE 1 G/100ML
1000 INJECTION INTRAVENOUS PRN
Status: DISCONTINUED | OUTPATIENT
Start: 2025-07-09 | End: 2025-07-11 | Stop reason: HOSPADM

## 2025-07-09 RX ADMIN — CLOZAPINE 50 MG: 25 TABLET ORAL at 21:07

## 2025-07-09 RX ADMIN — HYDROCORTISONE SODIUM SUCCINATE 50 MG: 100 INJECTION, POWDER, FOR SOLUTION INTRAMUSCULAR; INTRAVENOUS at 01:56

## 2025-07-09 RX ADMIN — PANTOPRAZOLE SODIUM 40 MG: 40 TABLET, DELAYED RELEASE ORAL at 05:17

## 2025-07-09 RX ADMIN — VENLAFAXINE HYDROCHLORIDE 225 MG: 150 CAPSULE, EXTENDED RELEASE ORAL at 08:15

## 2025-07-09 RX ADMIN — HYDROCORTISONE SODIUM SUCCINATE 50 MG: 100 INJECTION, POWDER, FOR SOLUTION INTRAMUSCULAR; INTRAVENOUS at 17:55

## 2025-07-09 RX ADMIN — SODIUM CHLORIDE 1000 MG PE: 9 INJECTION, SOLUTION INTRAVENOUS at 17:55

## 2025-07-09 RX ADMIN — PHENYTOIN SODIUM 300 MG: 100 CAPSULE, EXTENDED RELEASE ORAL at 21:07

## 2025-07-09 RX ADMIN — ENOXAPARIN SODIUM 40 MG: 100 INJECTION SUBCUTANEOUS at 08:16

## 2025-07-09 RX ADMIN — FLUDROCORTISONE ACETATE 0.1 MG: 0.1 TABLET ORAL at 08:15

## 2025-07-09 RX ADMIN — TOPIRAMATE 25 MG: 25 TABLET, FILM COATED ORAL at 05:17

## 2025-07-09 RX ADMIN — CYANOCOBALAMIN TAB 500 MCG 1000 MCG: 500 TAB at 08:15

## 2025-07-09 RX ADMIN — HYDROCORTISONE SODIUM SUCCINATE 50 MG: 100 INJECTION, POWDER, FOR SOLUTION INTRAMUSCULAR; INTRAVENOUS at 08:16

## 2025-07-09 RX ADMIN — LEVOTHYROXINE SODIUM ANHYDROUS 100 MCG: 100 INJECTION, POWDER, LYOPHILIZED, FOR SOLUTION INTRAVENOUS at 02:37

## 2025-07-09 RX ADMIN — SODIUM CHLORIDE, PRESERVATIVE FREE 5 ML: 5 INJECTION INTRAVENOUS at 08:17

## 2025-07-09 RX ADMIN — FOLIC ACID 1 MG: 1 TABLET ORAL at 08:15

## 2025-07-09 RX ADMIN — CLOZAPINE 50 MG: 25 TABLET ORAL at 02:37

## 2025-07-09 RX ADMIN — SODIUM CHLORIDE 5 ML: 9 INJECTION INTRAMUSCULAR; INTRAVENOUS; SUBCUTANEOUS at 08:18

## 2025-07-09 RX ADMIN — SODIUM CHLORIDE, PRESERVATIVE FREE 10 ML: 5 INJECTION INTRAVENOUS at 21:07

## 2025-07-09 RX ADMIN — SODIUM CHLORIDE, PRESERVATIVE FREE 10 ML: 5 INJECTION INTRAVENOUS at 13:12

## 2025-07-09 RX ADMIN — GADOTERIDOL 16 ML: 279.3 INJECTION, SOLUTION INTRAVENOUS at 13:11

## 2025-07-09 ASSESSMENT — PAIN SCALES - GENERAL: PAINLEVEL_OUTOF10: 0

## 2025-07-09 NOTE — H&P
[facility]  Office: [phone]  Magdy Mcdonnell DO, Flaco Torres, DO, Riki Massey DO, Austin Trujillo, DO, Cristal Bella MD, Radha Wetzel MD, Lucita Grider MD, Yue Arellano MD,  Randy Currie MD, Kathleen Scott MD, Rocio Rocha MD,  Rebekah Mohan DO, Emory Goodman MD, Onofre Medina MD, Camron Mcdonnell DO, Shaina Chappell MD,  Guy Geronimo DO, Ling Helton MD, Idalia Green MD, Kiara Everett MD,  Francis Jules MD, Jose Guajardo MD, Francesca Gaines MD, Rafi Pierce MD, Darren Wade MD, Mc Henao MD, Chester Armstrong, DO, Lola Melgar MD, Jason Cabrera DO, David Smith MD, Rebeakh Collier MD, Mohsin Reza, MD, Delfina Hubbard MD, Shirley Waterhouse, CNP,  Shanna Oneal, CNP, Chester Virk, CNP,  Sonja Cross, SHAMA, Maryellen vAalos, CNP, Marlyn Greene, CNP, Cecily Beauchamp, CNP, Stacey Childers, CNP, Angelique Appiah, PA-C, Sofia Ghosh, CNP, Monae Oliva, CNP,  Salome Frey, CNP, Federica Campbell, CNP, Dion Groves, PA-C, Mae Leach, PA-C, Tatyana Marion, CNP,        Jagruti Perez, CNS, Nicolasa Olsen, CNP, Ainsley Vega, CNP               [facility]      HISTORY AND PHYSICAL EXAMINATION            Date:   7/9/2025  Patient name:  Yaritza Esquivel  Date of admission:  7/8/2025  6:22 PM  MRN:   [id number]  Account:  [id number]  YOB: 1975  PCP:    Otis Acosta MD  Room:   04/04  Code Status:    Full Code    Chief Complaint:     Chief Complaint   Patient presents with    Seizures       History Obtained From:     patient, electronic medical record    History of Present Illness:     The patient is a 50 y.o.  Non- / non  female who presents with Seizures   and she is admitted to the hospital for the management of  Seizure (HCC).  Patient was brought to emergency room by ambulance.  [facility]  Patient has underlying history of depression disorder, been with thyroidectomy,  Janice Ochoa MD   vitamin B-12 (CYANOCOBALAMIN) 1000 MCG tablet Take 1 tablet by mouth daily 25   Janice Ochoa MD   pantoprazole (PROTONIX) 40 MG tablet Take 1 tablet by mouth every morning (before breakfast) 25   Janice Ochoa MD   cloZAPine (CLOZARIL) 50 MG tablet Take 1 tablet by mouth nightly 25   Janice Ochoa MD   venlafaxine (EFFEXOR XR) 75 MG extended release capsule Take 3 capsules by mouth daily (with breakfast) 25   Janice Ochoa MD   lidocaine 4 % external patch Place 1 patch onto the skin daily 25   Janice Ochoa MD        Allergies:     Meperidine, Morphine, Acetaminophen, Buprenorphine hcl-naloxone hcl, Beef-derived drug products, Celecoxib, Desipramine, Gabapentin, Norpramin [desipramine hcl], Pcn [penicillins], Pregabalin, Sulfa antibiotics, and Methadone    Social History:     Tobacco:    reports that she has been smoking cigarettes. She has a 14 pack-year smoking history. She has never used smokeless tobacco.  Alcohol:      reports that she does not currently use alcohol.  Drug Use:  reports that she does not currently use drugs after having used the following drugs: Cocaine and Marijuana (Weed).    Family History:     Family History   Problem Relation Age of Onset    Diabetes Mother     Cancer Mother         MEN syndrome    Stroke Brother         MEN syndrome    Depression Brother         Committed suicide    Hypertension Maternal Grandfather     Hypertension Paternal Grandfather        Review of Systems:     Positive and Negative as described in HPI.  Review of Systems   Reason unable to perform ROS: drowsy.   Neurological:  Positive for seizures.   Psychiatric/Behavioral:  Positive for decreased concentration and sleep disturbance.        Physical Exam:   BP (!) 134/95   Pulse 96   Temp 97.9 °F (36.6 °C) (Oral)   Resp 23   Wt 83.9 kg (185 lb)   LMP 2025   SpO2 93%   BMI 26.54 kg/m²   Temp (24hrs), Av.9 °F (36.6 °C), Min:97.9 °F (36.6 °C),

## 2025-07-09 NOTE — ED PROVIDER NOTES
Faculty Sign-Out Attestation  Handoff taken on the following patient from prior Attending Physician: Fabienne  Note Started: 11:00 PM EDT    I was available and discussed any additional care issues that arose and coordinated the management plans with the resident(s) caring for the patient during my duty period. Any areas of disagreement with resident’s documentation of care or procedures are noted on the chart. I was personally present for the key portions of any/all procedures during my duty period. I have documented in the chart those procedures where I was not present during the key portions.    Seizure, ct head pending,   --- ct-  Neuro to give direction ///admit    Cristhian Tran DO  Attending Physician       Cristhian Tran DO  07/08/25 3714       Cristhian Tran DO  07/09/25 3868

## 2025-07-09 NOTE — PLAN OF CARE
Problem: Chronic Conditions and Co-morbidities  Goal: Patient's chronic conditions and co-morbidity symptoms are monitored and maintained or improved  Outcome: Progressing     Problem: Seizure Precautions  Goal: Remains free of injury related to seizures activity  Outcome: Progressing     Problem: Pain  Goal: Verbalizes/displays adequate comfort level or baseline comfort level  Outcome: Progressing     Problem: Safety - Adult  Goal: Free from fall injury  Outcome: Progressing     Problem: Neurosensory - Adult  Goal: Achieves stable or improved neurological status  Outcome: Progressing  Goal: Absence of seizures  Outcome: Progressing     Problem: Cardiovascular - Adult  Goal: Maintains optimal cardiac output and hemodynamic stability  Outcome: Progressing  Goal: Absence of cardiac dysrhythmias or at baseline  Outcome: Progressing     Problem: Skin/Tissue Integrity - Adult  Goal: Skin integrity remains intact  Outcome: Progressing     Problem: Musculoskeletal - Adult  Goal: Return mobility to safest level of function  Outcome: Progressing     Problem: Infection - Adult  Goal: Absence of infection at discharge  Outcome: Progressing     Problem: Metabolic/Fluid and Electrolytes - Adult  Goal: Electrolytes maintained within normal limits  Outcome: Progressing  Goal: Hemodynamic stability and optimal renal function maintained  Outcome: Progressing     Problem: Hematologic - Adult  Goal: Maintains hematologic stability  Outcome: Progressing     Problem: Discharge Planning  Goal: Discharge to home or other facility with appropriate resources  Outcome: Progressing

## 2025-07-09 NOTE — CONSULTS
[facility] Neurology   IN-PATIENT SERVICE   [facility]    Neurology Consult Note            Date:   7/9/2025  Patient name:  Yaritza Esquivel  Date of admission:  7/8/2025  6:22 PM  MRN:   [id number]  Account:  [id number]  YOB: 1975  PCP:    Otis Acosta MD  Room:   04/04  Code Status:    Prior    Chief Complaint:     Chief Complaint   Patient presents with    Seizures       History Obtained From:     patient    History of Present Illness:     The patient is a 50 y.o. female with history of seizure, depression, bipolar disorder, PTSD, suicidal ideation . COPD, hypothyroidism, CVA, parathyroidectomy, and bilateral adrenalectomy with pheochromocytoma.  Seen in the emergency department due to concern of seizures like activity, patient states she was staring blank and lasted for a few minutes, she lost control of bladder during the episodes after which she was back to her normal self, She reports multiple similar episodes in the past, worsening for about three weeks, she is off any medication. She reports having tried klonopin, valproic acid which were \"too strong\" for her, she reports being on Topamax for some time but currently not taking it due to cost issues. Denies hitting head or loss of consciousness.  Denies recent trauma.  Denies headache, weakness, numbness, chest pain, palpitations, shortness of breath, cough, abdominal pain, nausea, vomiting.  Denies alcohol, tobacco, drug use.     She has had multiple visit to the emergency room with suicidal ideation and was admitted to the psych unit. Twice in the last few months     On exam patient is awake and answering questions, moves upper and lowe extremities, she is back to her baseline, per chart review he TSH is 108, T4.    Considering clinical presentation and history it is reasonable to optimize her thyroid function there is concern for myxedema coma or need for IV thyroxine       Past Medical History:     Past Medical  Lipase 46 13 - 60 U/L   TSH reflex to FT4    Collection Time: 07/08/25  9:30 PM   Result Value Ref Range    .00 (H) 0.27 - 4.20 uIU/mL   T4, Free    Collection Time: 07/08/25  9:30 PM   Result Value Ref Range    T4 Free 0.4 (L) 0.92 - 1.68 ng/dL     CBC with Auto Differential        Component Value Flag Ref Range Units Status    WBC 8.9      3.5 - 11.3 k/uL Final    RBC 4.01      3.95 - 5.11 m/uL Final    Hemoglobin 11.7      11.9 - 15.1 g/dL Final    Hematocrit 36.3      36.3 - 47.1 % Final    MCV 90.5      82.6 - 102.9 fL Final    MCH 29.2      25.2 - 33.5 pg Final    MCHC 32.2      28.4 - 34.8 g/dL Final    RDW 13.7      11.8 - 14.4 % Final    Platelets 270      138 - 453 k/uL Final    MPV 9.5      8.1 - 13.5 fL Final    NRBC Automated 0.0      0.0 per 100 WBC Final    Neutrophils % 61      36 - 65 % Final    Lymphocytes % 28      24 - 43 % Final    Monocytes % 5      3 - 12 % Final    Eosinophils % 4      1 - 4 % Final    Basophils % 1      0 - 2 % Final    Immature Granulocytes % 1      0 % Final    Neutrophils Absolute 5.50      1.50 - 8.10 k/uL Final    Lymphocytes Absolute 2.49      1.10 - 3.70 k/uL Final    Monocytes Absolute 0.48      0.10 - 1.20 k/uL Final    Eosinophils Absolute 0.36      0.00 - 0.44 k/uL Final    Basophils Absolute 0.05      0.00 - 0.20 k/uL Final    Immature Granulocytes Absolute 0.06      0.00 - 0.30 k/uL Final                  Urinalysis with Reflex to Culture        Specimen Information: Urine      Component Value Flag Ref Range Units Status    Color, UA Yellow      Yellow  Final    Turbidity UA Clear      Clear  Final    Glucose, Ur NEGATIVE      NEGATIVE mg/dL Final    Bilirubin, Urine NEGATIVE      NEGATIVE  Final    Ketones, Urine NEGATIVE      NEGATIVE mg/dL Final    Specific Gravity, UA 1.009      1.005 - 1.030  Final    Urine Hgb NEGATIVE      NEGATIVE  Final    pH, Urine 6.5      5.0 - 8.0  Final    Protein, UA NEGATIVE      NEGATIVE mg/dL Final    Urobilinogen, Urine

## 2025-07-09 NOTE — CARE COORDINATION
Case Management Assessment  Initial Evaluation    Date/Time of Evaluation: 7/9/2025 12:18 PM  Assessment Completed by: Claribel Villela    If patient is discharged prior to next notation, then this note serves as note for discharge by case management.    Patient Name: Yaritza Esquivel                   YOB: 1975  Diagnosis: Hypothyroid [E03.9]                   Date / Time: 7/8/2025  6:22 PM    Patient Admission Status: Inpatient   Readmission Risk (Low < 19, Mod (19-27), High > 27): Readmission Risk Score: 33    Current PCP: Otis Acosta MD  PCP verified by CM? (P) Yes (Dr Otis Acosta)    Chart Reviewed: Yes      History Provided by: (P) Patient  Patient Orientation: (P) Alert and Oriented, Person, Place, Situation, Self    Patient Cognition: (P) Alert    Hospitalization in the last 30 days (Readmission):  No    If yes, Readmission Assessment in CM Navigator will be completed.    Advance Directives:      Code Status: Full Code   Patient's Primary Decision Maker is: (P) Legal Next of Kin      Discharge Planning:    Patient lives with: (P) Other (Comment) (stays at the [facility]) Type of Home: (P) Other (Comment), Homeless, Shelter (stays at the [facility])  Primary Care Giver: (P) Self  Patient Support Systems include: (P) Other (Comment) ([facility] [employer] staff)   Current Financial resources: (P) Medicaid  Current community resources: (P) Lodging (stays at the [facility])  Current services prior to admission: (P) Other (Comment) ([facility])            Current DME:              Type of Home Care services:  (P) None    ADLS  Prior functional level: (P) Independent in ADLs/IADLs  Current functional level: (P) Independent in ADLs/IADLs    PT AM-PAC:   /24  OT AM-PAC:   /24    Family can provide assistance at DC: (P) No  Would you like Case Management to discuss the discharge plan with any other family members/significant others, and if so, who? (P) No  Plans to Return to Present

## 2025-07-09 NOTE — ED NOTES
-- -- -- 75 22 96 % --   07/09/25 0200 114/72 -- -- 76 18 97 % --   07/09/25 0137 (!) 119/55 -- -- 80 17 98 % --   07/08/25 2345 -- -- -- 75 22 97 % --   07/08/25 2100 126/67 -- -- 84 16 100 % --   07/08/25 2046 -- -- -- 75 17 99 % --   07/08/25 2039 -- -- -- 83 17 98 % --   07/08/25 2033 -- -- -- 81 16 99 % --   07/08/25 2026 -- -- -- 81 22 98 % --   07/08/25 2019 -- -- -- 82 21 98 % --   07/08/25 1827 108/87 -- -- -- -- -- --   07/08/25 1824 -- 97.9 °F (36.6 °C) Oral 83 17 100 % 83.9 kg (185 lb)      Visit Vitals  BP (!) 134/95   Pulse 96   Temp 97.9 °F (36.6 °C) (Oral)   Resp 23   Wt 83.9 kg (185 lb)   SpO2 93%   BMI 26.54 kg/m²        LDAs:   Peripheral IV 07/08/25 Right Hand (Active)   Site Assessment Clean, dry & intact 07/08/25 1857   Line Status Brisk blood return;Normal saline locked 07/08/25 1857   Dressing Status Clean, dry & intact 07/08/25 1857   Dressing Type Transparent 07/08/25 1857   Dressing Intervention New 07/08/25 1857       Ambulatory Status:  Presents to emergency department  because of falls (Syncope, seizure, or loss of consciousness): No, Age > 70: No, Altered Mental Status, Intoxication with alcohol or substance confusion (Disorientation, impaired judgment, poor safety awaremess, or inability to follow instructions): No, Impaired Mobility: Ambulates or transfers with assistive devices or assistance; Unable to ambulate or transer.: Yes, Nursing Judgement: Yes    Diagnosis:  DISPOSITION Admitted 07/09/2025 12:30:11 AM   Final diagnoses:   Seizure (HCC)   Hypothyroidism in adult        Consults:  IP CONSULT TO NEUROLOGY  IP CONSULT TO HOSPITALIST     Treatment Team:   Treatment Team:   Lucita Grider MD Singh, Anmol, MD Miller, Karley, Bart Jha, Matt Smith MD Tutolo, Sarah, EDGAR - CNP    Treatment:  ED Course as of 07/09/25 0639 Tue Jul 08, 2025   5428 CBC with Auto Differential(!):    WBC 8.9   RBC 4.01   Hemoglobin Quant 11.7(!)   Hematocrit 36.3   MCV 90.5   MCH  (CYANOCOBALAMIN) 1000 MCG TABLET    Take 1 tablet by mouth daily     Orders Placed This Encounter   Medications    levothyroxine (SYNTHROID) tablet 150 mcg    sodium chloride flush 0.9 % injection 5-40 mL    sodium chloride flush 0.9 % injection 10 mL    0.9 % sodium chloride infusion    OR Linked Order Group     potassium chloride (KLOR-CON M) extended release tablet 40 mEq     potassium bicarb-citric acid (EFFER-K) effervescent tablet 40 mEq     potassium chloride 10 mEq/100 mL IVPB (Peripheral Line)    magnesium sulfate 1000 mg in dextrose 5% 100 mL IVPB    enoxaparin (LOVENOX) injection 40 mg     Indication of Use:   Prophylaxis-DVT/PE    OR Linked Order Group     ondansetron (ZOFRAN-ODT) disintegrating tablet 4 mg     ondansetron (ZOFRAN) injection 4 mg    polyethylene glycol (GLYCOLAX) packet 17 g    OR Linked Order Group     acetaminophen (TYLENOL) tablet 650 mg     acetaminophen (TYLENOL) suppository 650 mg    DISCONTD: levothyroxine (SYNTHROID) injection 100 mcg     Is levothyroxine IV being used to treat myxedema coma?:   No    DISCONTD: sodium chloride (PF) 0.9 % injection 5 mL    topiramate (TOPAMAX) tablet 25 mg    hydrocortisone (CORTEF) tablet 5 mg    hydrocortisone (CORTEF) tablet 15 mg    pantoprazole (PROTONIX) tablet 40 mg    venlafaxine (EFFEXOR XR) extended release capsule 225 mg    vitamin B-12 (CYANOCOBALAMIN) tablet 1,000 mcg    lidocaine 4 % external patch 1 patch    folic acid (FOLVITE) tablet 1 mg    fludrocortisone (FLORINEF) tablet 0.1 mg    cloZAPine (CLOZARIL) tablet 50 mg    AND Linked Order Group     levothyroxine (SYNTHROID) injection 100 mcg      Is levothyroxine IV being used to treat myxedema coma?:   Yes     sodium chloride (PF) 0.9 % injection 5 mL    hydrocortisone sodium succinate PF (SOLU-CORTEF) injection 50 mg       SURGICAL HISTORY       Past Surgical History:   Procedure Laterality Date    THYROIDECTOMY      TONSILLECTOMY      TOTAL HIP ARTHROPLASTY Right        PAST

## 2025-07-09 NOTE — ED NOTES
Writer at bedside, pt is A&Ox4, respirations even and unlabored, arabella morning labs, pt denies any needs at this time.

## 2025-07-09 NOTE — PROCEDURES
PROCEDURE NOTE  Date: 7/9/2025   Name: Yaritza Esquivel  YOB: 1975    Procedures            Date: 7/9/2025  Referring physician: Dr. Grider    Indication  Patient aged 50 y with AMS. EEG done to assess for epileptiform activity.    Introduction  This routine 20-minute EEG was recorded using the International 10-20 System on a after[employer] workstation at 256 samples/s. Automated spike and seizure detection algorithms were applied.    Description  During the maximal alert state, a well-regulated, symmetric, and reactive 8-9 Hz posterior dominant rhythm was seen. No consistent focal slowing or interhemispheric asymmetry was noted. Stage I and stage II sleep were observed. There were no interictal epileptiform discharges or electrographic seizures.    Activations  Hyperventilation was not performed. Intermittent photic stimulation was performed and demonstrated no posterior driving response.    Impression  Normal awake and sleep EEG.       EKG lead did not show clear arrhythmia, if still in concern consider formal EKG or correlation with telemetry.       No epileptiform discharges were identified. Please note the absence of such activity on this record cannot conclusively rule out an epileptic disorder. If such is still clinically suspected, a repeat study with sleep deprivation and/or prolonged sampling may be helpful.    Rebekah Erwin MD  Epilepsy Board Certified.  Neurology Board Certified.    Electronically Signed

## 2025-07-09 NOTE — ED PROVIDER NOTES
[facility] EMERGENCY DEPARTMENT  Emergency Department Encounter  Emergency Medicine Resident     Pt Name:Yaritza Esquivel  MRN: [id number]  Birthdate 1975  Date of evaluation: 7/9/25  PCP:  Otis Acosta MD  Note Started: 2:38 AM EDT      CHIEF COMPLAINT       Chief Complaint   Patient presents with    Seizures       HISTORY OF PRESENT ILLNESS  (Location/Symptom, Timing/Onset, Context/Setting, Quality, Duration, Modifying Factors, Severity.)      Yaritza Esquivel is a 50 y.o. female who presents with witnessed seizure.  Patient currently resides with parents where she had a witnessed seizure with an episode of urinary incontinence.  Patient states she had a history of seizures approximately 10 years ago and was on topiramate.  Patient states that she had discontinued the medication without any ones recommendation.  Patient has had more frequent episodes of seizure in the past couple of months.    PAST MEDICAL / SURGICAL / SOCIAL / FAMILY HISTORY      has a past medical history of Adrenal insufficiency, Depression, Diabetes mellitus type 2, diet-controlled (HCC), Headache, Hypothyroidism, and MEN (multiple endocrine neoplasia) (HCC).       has a past surgical history that includes Thyroidectomy; Tonsillectomy; and Total hip arthroplasty (Right).      Social History     Socioeconomic History    Marital status: Single     Spouse name: Not on file    Number of children: Not on file    Years of education: Not on file    Highest education level: Not on file   Occupational History    Not on file   Tobacco Use    Smoking status: Some Days     Current packs/day: 0.50     Average packs/day: 0.5 packs/day for 28.0 years (14.0 ttl pk-yrs)     Types: Cigarettes    Smokeless tobacco: Never   Vaping Use    Vaping status: Every Day   Substance and Sexual Activity    Alcohol use: Not Currently     Comment: Sober since 2010    Drug use: Not Currently     Types: Cocaine, Marijuana (Weed)     Comment: Sober since October 2018  Differential(!):    WBC 8.9   RBC 4.01   Hemoglobin Quant 11.7(!)   Hematocrit 36.3   MCV 90.5   MCH 29.2   MCHC 32.2   RDW 13.7   Platelet Count 270   MPV 9.5   NRBC Automated 0.0   Neutrophils % 61   Lymphocyte % 28   Monocytes % 5   Eosinophils % 4   Basophils % 1   Immature Granulocytes % 1(!)   Neutrophils Absolute 5.50   Lymphocytes Absolute 2.49   Monocytes Absolute 0.48   Eosinophils Absolute 0.36   Basophils Absolute 0.05   Immature Granulocytes Absolute 0.06 [AS]   2328 Comprehensive Metabolic Panel(!):    Sodium 137   Potassium 4.5   Chloride 101   CARBON DIOXIDE 21   Anion Gap 15   Glucose 98   BUN,BUNPL 15   Creatinine 0.9   Est, Glom Filt Rate 78   Calcium 10.6(!)   Total Protein 7.1   Albumin 3.9   Albumin/Globulin Ratio 1.2   Total Bilirubin <0.2   Alkaline Phosphatase 120(!)   ALT 20   AST 30 [AS]   2328 TSH reflex to FT4(!):    TSH, 3rd Generation 108.00(!) [AS]   2328 T4, Free(!):    T4 Free 0.4(!) [AS]   2328 HCG Qualitative, Serum:    Preg, Serum NEGATIVE [AS]   2328 CT HEAD WO CONTRAST [AS]   2329 1.  No acute intracranial findings.     2.  Colloid cyst measuring 5 mm in the 3rd ventricle.  No definite findings  of acute hydrocephalus although CT has limited sensitivity for this  diagnosis.  Brain MRI is recommended for further characterization.   [AS]      ED Course User Index  [AS] Sergei Rosa MD       PROCEDURES:      CONSULTS:  IP CONSULT TO NEUROLOGY  IP CONSULT TO HOSPITALIST    CRITICAL CARE:  There was significant risk of life threatening deterioration of patient's condition requiring my direct management. Critical care time  minutes, excluding any documented procedures.    FINAL IMPRESSION      1. Seizure (HCC)    2. Hypothyroidism in adult    3. Hypothyroidism, unspecified type          DISPOSITION / PLAN     DISPOSITION Admitted 07/09/2025 12:30:11 AM               PATIENT REFERRED TO:  No follow-up provider specified.    DISCHARGE MEDICATIONS:  New Prescriptions    No medications

## 2025-07-09 NOTE — ED NOTES
Writer at bedside pt remains on monitor, respirations unlabored, A&Ox4, pt denies any needs at this time

## 2025-07-10 PROBLEM — R56.9 SEIZURE (HCC): Status: RESOLVED | Noted: 2025-07-09 | Resolved: 2025-07-10

## 2025-07-10 LAB
ANION GAP SERPL CALCULATED.3IONS-SCNC: 16 MMOL/L (ref 9–16)
BUN SERPL-MCNC: 16 MG/DL (ref 6–20)
CALCIUM SERPL-MCNC: 10 MG/DL (ref 8.6–10.4)
CHLORIDE SERPL-SCNC: 104 MMOL/L (ref 98–107)
CO2 SERPL-SCNC: 18 MMOL/L (ref 20–31)
CREAT SERPL-MCNC: 0.7 MG/DL (ref 0.6–0.9)
EKG ATRIAL RATE: 80 BPM
EKG P AXIS: 63 DEGREES
EKG P-R INTERVAL: 180 MS
EKG Q-T INTERVAL: 366 MS
EKG QRS DURATION: 66 MS
EKG QTC CALCULATION (BAZETT): 422 MS
EKG R AXIS: -12 DEGREES
EKG T AXIS: 78 DEGREES
EKG VENTRICULAR RATE: 80 BPM
GFR, ESTIMATED: >90 ML/MIN/1.73M2
GLUCOSE SERPL-MCNC: 110 MG/DL (ref 74–99)
PHENYTOIN FREE SERPL-MCNC: 0.9 UG/ML (ref 1–2)
PHENYTOIN SERPL-MCNC: 9.9 UG/ML (ref 10–20)
POTASSIUM SERPL-SCNC: 4.3 MMOL/L (ref 3.7–5.3)
SODIUM SERPL-SCNC: 138 MMOL/L (ref 136–145)

## 2025-07-10 PROCEDURE — 99232 SBSQ HOSP IP/OBS MODERATE 35: CPT | Performed by: STUDENT IN AN ORGANIZED HEALTH CARE EDUCATION/TRAINING PROGRAM

## 2025-07-10 PROCEDURE — 6370000000 HC RX 637 (ALT 250 FOR IP): Performed by: INTERNAL MEDICINE

## 2025-07-10 PROCEDURE — APPSS30 APP SPLIT SHARED TIME 16-30 MINUTES: Performed by: NURSE PRACTITIONER

## 2025-07-10 PROCEDURE — 6370000000 HC RX 637 (ALT 250 FOR IP): Performed by: PSYCHIATRY & NEUROLOGY

## 2025-07-10 PROCEDURE — 80186 ASSAY OF PHENYTOIN FREE: CPT

## 2025-07-10 PROCEDURE — 97165 OT EVAL LOW COMPLEX 30 MIN: CPT

## 2025-07-10 PROCEDURE — 97530 THERAPEUTIC ACTIVITIES: CPT

## 2025-07-10 PROCEDURE — 6360000002 HC RX W HCPCS: Performed by: INTERNAL MEDICINE

## 2025-07-10 PROCEDURE — 93010 ELECTROCARDIOGRAM REPORT: CPT | Performed by: INTERNAL MEDICINE

## 2025-07-10 PROCEDURE — 2060000000 HC ICU INTERMEDIATE R&B

## 2025-07-10 PROCEDURE — 2500000003 HC RX 250 WO HCPCS: Performed by: NURSE PRACTITIONER

## 2025-07-10 PROCEDURE — 80048 BASIC METABOLIC PNL TOTAL CA: CPT

## 2025-07-10 PROCEDURE — 97535 SELF CARE MNGMENT TRAINING: CPT

## 2025-07-10 PROCEDURE — 80185 ASSAY OF PHENYTOIN TOTAL: CPT

## 2025-07-10 PROCEDURE — 94761 N-INVAS EAR/PLS OXIMETRY MLT: CPT

## 2025-07-10 PROCEDURE — 6360000002 HC RX W HCPCS: Performed by: NURSE PRACTITIONER

## 2025-07-10 PROCEDURE — 99232 SBSQ HOSP IP/OBS MODERATE 35: CPT | Performed by: PSYCHIATRY & NEUROLOGY

## 2025-07-10 PROCEDURE — 36415 COLL VENOUS BLD VENIPUNCTURE: CPT

## 2025-07-10 RX ADMIN — HYDROCORTISONE SODIUM SUCCINATE 50 MG: 100 INJECTION, POWDER, FOR SOLUTION INTRAMUSCULAR; INTRAVENOUS at 09:28

## 2025-07-10 RX ADMIN — PANTOPRAZOLE SODIUM 40 MG: 40 TABLET, DELAYED RELEASE ORAL at 05:17

## 2025-07-10 RX ADMIN — PHENYTOIN SODIUM 300 MG: 100 CAPSULE, EXTENDED RELEASE ORAL at 08:46

## 2025-07-10 RX ADMIN — SODIUM CHLORIDE, PRESERVATIVE FREE 10 ML: 5 INJECTION INTRAVENOUS at 19:36

## 2025-07-10 RX ADMIN — FLUDROCORTISONE ACETATE 0.1 MG: 0.1 TABLET ORAL at 08:48

## 2025-07-10 RX ADMIN — SODIUM CHLORIDE, PRESERVATIVE FREE 10 ML: 5 INJECTION INTRAVENOUS at 08:46

## 2025-07-10 RX ADMIN — HYDROCORTISONE SODIUM SUCCINATE 50 MG: 100 INJECTION, POWDER, FOR SOLUTION INTRAMUSCULAR; INTRAVENOUS at 02:01

## 2025-07-10 RX ADMIN — VENLAFAXINE HYDROCHLORIDE 225 MG: 150 CAPSULE, EXTENDED RELEASE ORAL at 08:47

## 2025-07-10 RX ADMIN — FOLIC ACID 1 MG: 1 TABLET ORAL at 08:46

## 2025-07-10 RX ADMIN — HYDROCORTISONE SODIUM SUCCINATE 50 MG: 100 INJECTION, POWDER, FOR SOLUTION INTRAMUSCULAR; INTRAVENOUS at 16:55

## 2025-07-10 RX ADMIN — CLOZAPINE 50 MG: 25 TABLET ORAL at 19:36

## 2025-07-10 RX ADMIN — ENOXAPARIN SODIUM 40 MG: 100 INJECTION SUBCUTANEOUS at 08:47

## 2025-07-10 RX ADMIN — CYANOCOBALAMIN TAB 500 MCG 1000 MCG: 500 TAB at 08:46

## 2025-07-10 ASSESSMENT — PAIN SCALES - GENERAL
PAINLEVEL_OUTOF10: 0
PAINLEVEL_OUTOF10: 0

## 2025-07-10 NOTE — PLAN OF CARE
Problem: Chronic Conditions and Co-morbidities  Goal: Patient's chronic conditions and co-morbidity symptoms are monitored and maintained or improved  7/10/2025 0435 by Randy Sousa RN  Outcome: Progressing  7/9/2025 1808 by Kvng Gore RN  Outcome: Progressing     Problem: Seizure Precautions  Goal: Remains free of injury related to seizures activity  7/10/2025 0435 by Randy Sousa RN  Outcome: Progressing  7/9/2025 1808 by Kvng Gore RN  Outcome: Progressing     Problem: Pain  Goal: Verbalizes/displays adequate comfort level or baseline comfort level  7/10/2025 0435 by Randy Sousa RN  Outcome: Progressing  7/9/2025 1808 by Kvng Gore RN  Outcome: Progressing     Problem: Safety - Adult  Goal: Free from fall injury  7/10/2025 0435 by Randy Sousa RN  Outcome: Progressing  7/9/2025 1808 by Kvng Gore RN  Outcome: Progressing     Problem: Neurosensory - Adult  Goal: Achieves stable or improved neurological status  7/10/2025 0435 by Randy Sousa RN  Outcome: Progressing  7/9/2025 1808 by Kvng Gore RN  Outcome: Progressing  Goal: Absence of seizures  7/10/2025 0435 by Randy Sousa RN  Outcome: Progressing  7/9/2025 1808 by Kvng Gore RN  Outcome: Progressing     Problem: Cardiovascular - Adult  Goal: Maintains optimal cardiac output and hemodynamic stability  7/10/2025 0435 by Randy Sousa RN  Outcome: Progressing  7/9/2025 1808 by Kvng Gore RN  Outcome: Progressing  Goal: Absence of cardiac dysrhythmias or at baseline  7/10/2025 0435 by Randy Sousa RN  Outcome: Progressing  7/9/2025 1808 by Kvng Gore RN  Outcome: Progressing     Problem: Skin/Tissue Integrity - Adult  Goal: Skin integrity remains intact  7/10/2025 0435 by Randy Sousa RN  Outcome: Progressing  Flowsheets (Taken 7/9/2025 1909)  Skin Integrity Remains Intact: Monitor for areas of redness and/or skin breakdown  7/9/2025 1808 by Kvng Gore RN  Outcome:

## 2025-07-10 NOTE — PROGRESS NOTES
Neurology Nurse Practitioner Progress Note      INTERVAL HISTORY: This is a 50 y.o.  female admitted 7/8/2025 after seizure-like activity. This is a follow-up neurology progress note. The patient was examined and the chart was reviewed. Discussed with the pt & RN. There were no acute events overnight. No new motor, sensory, visual or bulbar symptoms. No seizure-like activity since admission.    HPI: Yaritza Esquivel is a 50 y.o. female with H/O staring episodes, headaches, MEN type II, b/l adrenalectomy d/t pheochromocytoma, medullary thyroid CA s/p thyroidectomy, parathyroidectomy, COPD, who was admitted 7/8/2025 after seizure-like activity.  Patient [facility] at [facility].  Apparently she had a brief witnessed staring episode with urinary incontinence.  Unknown about total length of episode; she had reported from 30 seconds to several minutes, before she was back to her baseline with no postictal confusion reported.  She has significant history of MEN type II, and has undergone b/l adrenalectomy d/t pheochromocytoma, thyroidectomy due to medullary thyroid cancer and parathyroidectomy.  She is supposed to take Florinef, Cortef, Effexor and Klonopin.  She reported that she has not taken any prescription medications for the past several weeks and did not see any physician due to \"some issues\".  She presented to [facility] ER on 7/8/2025 for evaluation.    Neurology was consulted due to seizure like activity.  Patient reported started having seizures consisting of staring and shaking episodes when she was around 30 years old.  Initially she was on Topamax that she stopped taking due to cost.  Depakote caused her nausea and vomiting.  Reported that she has not taken any seizure medications and has stayed seizure-free for the past 15 years.  However recently started having several intermittent episodes in the past 4 months.  Does not follow-up with neurology.      levothyroxine  150 mcg Oral Daily    sodium chloride flush

## 2025-07-10 NOTE — PROGRESS NOTES
Physician Progress Note      PATIENT:               NATALYA ORTIZ  CSN #:                  [id number]  :                       1975  ADMIT DATE:       2025 6:22 PM  DISCH DATE:  RESPONDING  PROVIDER #:        Darren Wade MD          QUERY TEXT:    Please clarify in documentation the relationship, if any, between the   witnessed seizure and history of CVA.    The clinical indicators include:  - admission for witnessed seizure activity at home with urinary incontinence.    -NEUROLOGY PN -  -\"Seen in the emergency department due to concern of   seizures like activity, patient states she was staring blank and lasted for a   few minutes, she lost control of bladder during the episodes after which she   was back to her normal self, She reports multiple similar episodes in the   past, worsening for about three weeks, she is off any medication...she stopped   taking meds d/t cost issue, and of her own choosing.\"    - PO meds- Dilantin, Topamax, Thyroxine, Florinef, Cortef, Synthroid, Iron,   Folvite, Vit B-12, Protonix, Clozaril, Effexor,  Options provided:  -- Seizure related to or associated with sequela of CVA.  -- Seizure is not related to or associated with sequela of CVA.  -- Other - I will add my own diagnosis  -- Disagree - Not applicable / Not valid  -- Disagree - Clinically unable to determine / Unknown  -- Refer to Clinical Documentation Reviewer    PROVIDER RESPONSE TEXT:    The Seizure related to or associated with sequela of CVA.    Query created by: Camron Raines on 7/10/2025 8:17 AM      Electronically signed by:  Darren Wade MD 7/10/2025 4:16 PM

## 2025-07-10 NOTE — PROGRESS NOTES
Occupational Therapy  Occupational Therapy Initial Evaluation  Facility/Department: [address] ONC/MED SURG   Patient Name: Yaritza Esquivel        MRN: [id number]    : 1975    Date of Service: 7/10/2025    Chief Complaint   Patient presents with    Seizures     Past Medical History:  has a past medical history of Adrenal insufficiency, Depression, Diabetes mellitus type 2, diet-controlled (HCC), Headache, Hypothyroidism, and MEN (multiple endocrine neoplasia) (HCC).  Past Surgical History:  has a past surgical history that includes Thyroidectomy; Tonsillectomy; and Total hip arthroplasty (Right).    Discharge Recommendations   No occupational therapy recommended at discharge    OT Equipment Recommendations  Equipment Needed: No    Assessment  Assessment: Pt completed functional mobility and ADL tasks reported below IND-SBA d/t first time up with OT for safety with no acute deficits observed. Pt declines any concern of ability to complete ADLs and functional mobility tasks independently. Pt does not currently require skilled OT services at this time. Will defer OT services. Pt educated to report to RN / MD if functional changes occur for reevaluation. Pt had good return to all education.  Prognosis: Good  Decision Making: Low Complexity  No Skilled OT: No OT goals identified  REQUIRES OT FOLLOW-UP: No  Activity Tolerance  Activity Tolerance: Patient tolerated treatment well  Safety Devices  Type of Devices: Bed alarm in place, Call light within reach, Gait belt, Left in bed, Telesitter in use, Nurse notified    AM-PAC  AM-PAC Daily Activity - Inpatient   How much help is needed for putting on and taking off regular lower body clothing?: None  How much help is needed for bathing (which includes washing, rinsing, drying)?: None  How much help is needed for toileting (which includes using toilet, bedpan, or urinal)?: None  How much help is needed for putting on and taking off regular upper body clothing?: None  How much

## 2025-07-10 NOTE — DISCHARGE INSTRUCTIONS
Follow-up outpatient with PCP and neurology.  Continue medications as prescribed.  Follow-up on TSH and T4 levels in 4 to 6 weeks  Driving restrictions as per neurology

## 2025-07-10 NOTE — PROGRESS NOTES
[facility]  Office: [phone]  Magdy Mcdonnell DO, Flaco Torres DO, Riki Massey DO, Austin Trujillo DO, Cristal Bella MD, Radha Wetzel MD, Lucita Grider MD, Yue Arellano MD,  Randy Currie MD, Kathleen Scott MD, Yair Brian DO, Rocio Rocha MD,  Rebekah Mohan DO, Emory Goodman MD, Onofre Medina MD, Camron Mcdonnell DO, Shaina Chappell MD,  Guy Geronimo DO, Rosa Call MD, Ling Helton MD, Idalia Green MD, Kiara Everett MD,  Francis Jules MD, Jose Guajardo MD, Francesca Gaines MD, Pk Terry DO, Emmie Avila MD,  David Smith MD, Shirley Waterhouse, CNP,  Shanna Oneal, CNP, Tatyana Marion, CNP, Chester Virk, CNP,  Sonja Cross, DNP, Maryellen Avalos, CNP, Marlyn Greene, CNP, Nicolasa Olsen, CNP, Cecily Beauchamp, CNP, Stacey Childers, CNP, Denzel Rasheed PA-C, Jagruti Perez, CNS, Maria C Chávez, CNP, Ainsley Vega, CNP         [facility]   IN-PATIENT SERVICE   [facility]    Progress Note    7/10/2025    4:09 PM    Name:   Yaritza Esquivel  MRN:     [id number]     Acct:      [id number]   Room:   0436/0436-01   Day:  1  Admit Date:  7/8/2025  6:22 PM    PCP:   Otis Acosta MD  Code Status:  Full Code    Subjective:     Patient seen and examined this morning, resting comfortably in the bed.  Stated that she is feeling exhausted, no acute events overnight.  Neurology on board, patient underwent EEG.  Remained afebrile, vitals within normal range.  Labs reviewed, glucose 100, phenytoin level 9.9.      Brief history:  As per documentation  The patient is a 50 y.o.  Non- / non  female who presents with Seizures   and she is admitted to the hospital for the management of  Seizure (HCC).  Patient was brought to emergency room by ambulance.  She states that she lives in [facility] group home.  Patient has underlying history of depression disorder, been with thyroidectomy, parathyroidectomy, bilateral adrenalectomy with  weeks  Schizophrenia / Bipolar disorder-continue home medications.  H/o men  s/p thyroidectomy, parathyroidectomy, adrenalectomy with adrenal insufficiency -continue Solu-Cortef.  Continue Florinef.  Monitor blood pressure.  Anticipate discharge if okay with neurology in next 24 hours.    Medical Decision Making: Lis Wade MD  7/10/2025  4:09 PM

## 2025-07-10 NOTE — PLAN OF CARE
Problem: Chronic Conditions and Co-morbidities  Goal: Patient's chronic conditions and co-morbidity symptoms are monitored and maintained or improved  7/10/2025 1431 by Kvng Gore RN  Outcome: Progressing  7/10/2025 0435 by Randy Sousa RN  Outcome: Progressing     Problem: Seizure Precautions  Goal: Remains free of injury related to seizures activity  7/10/2025 1431 by Kvng Gore RN  Outcome: Progressing  7/10/2025 0435 by Randy Sousa RN  Outcome: Progressing     Problem: Pain  Goal: Verbalizes/displays adequate comfort level or baseline comfort level  7/10/2025 1431 by Kvng Gore RN  Outcome: Progressing  7/10/2025 0435 by Randy Sousa RN  Outcome: Progressing     Problem: Safety - Adult  Goal: Free from fall injury  7/10/2025 1431 by Kvng Gore RN  Outcome: Progressing  7/10/2025 0435 by Randy Sousa RN  Outcome: Progressing     Problem: Neurosensory - Adult  Goal: Achieves stable or improved neurological status  7/10/2025 1431 by Kvng Gore RN  Outcome: Progressing  7/10/2025 0435 by Randy Sousa RN  Outcome: Progressing  Goal: Absence of seizures  7/10/2025 1431 by Kvng Gore RN  Outcome: Progressing  7/10/2025 0435 by Randy Sousa RN  Outcome: Progressing     Problem: Cardiovascular - Adult  Goal: Maintains optimal cardiac output and hemodynamic stability  7/10/2025 1431 by Kvng Gore RN  Outcome: Progressing  7/10/2025 0435 by Randy Sousa RN  Outcome: Progressing  Goal: Absence of cardiac dysrhythmias or at baseline  7/10/2025 1431 by Kvng Gore RN  Outcome: Progressing  7/10/2025 0435 by Randy Sousa RN  Outcome: Progressing     Problem: Musculoskeletal - Adult  Goal: Return mobility to safest level of function  7/10/2025 1431 by Kvng Gore RN  Outcome: Progressing  7/10/2025 0435 by Randy Sousa RN  Outcome: Progressing     Problem: Infection - Adult  Goal: Absence of infection at discharge  7/10/2025 1431

## 2025-07-11 ENCOUNTER — APPOINTMENT (OUTPATIENT)
Age: 50
End: 2025-07-11
Attending: INTERNAL MEDICINE
Payer: COMMERCIAL

## 2025-07-11 VITALS
HEART RATE: 110 BPM | RESPIRATION RATE: 20 BRPM | WEIGHT: 185 LBS | BODY MASS INDEX: 25.06 KG/M2 | TEMPERATURE: 98.4 F | SYSTOLIC BLOOD PRESSURE: 121 MMHG | HEIGHT: 72 IN | DIASTOLIC BLOOD PRESSURE: 85 MMHG | OXYGEN SATURATION: 95 %

## 2025-07-11 LAB
ANION GAP SERPL CALCULATED.3IONS-SCNC: 11 MMOL/L (ref 9–16)
BUN SERPL-MCNC: 16 MG/DL (ref 6–20)
CALCIUM SERPL-MCNC: 10.2 MG/DL (ref 8.6–10.4)
CHLORIDE SERPL-SCNC: 103 MMOL/L (ref 98–107)
CO2 SERPL-SCNC: 23 MMOL/L (ref 20–31)
CREAT SERPL-MCNC: 0.8 MG/DL (ref 0.6–0.9)
ECHO AO ROOT DIAM: 2.7 CM
ECHO AO ROOT INDEX: 1.31 CM/M2
ECHO AV AREA PEAK VELOCITY: 2.2 CM2
ECHO AV AREA VTI: 2.1 CM2
ECHO AV AREA/BSA PEAK VELOCITY: 1.1 CM2/M2
ECHO AV AREA/BSA VTI: 1 CM2/M2
ECHO AV MEAN GRADIENT: 3 MMHG
ECHO AV MEAN VELOCITY: 0.8 M/S
ECHO AV PEAK GRADIENT: 5 MMHG
ECHO AV PEAK VELOCITY: 1.1 M/S
ECHO AV VELOCITY RATIO: 0.73
ECHO AV VTI: 15.2 CM
ECHO BSA: 2.07 M2
ECHO EST RA PRESSURE: 8 MMHG
ECHO IVC PROX: 1.8 CM
ECHO LA AREA 2C: 8.3 CM2
ECHO LA AREA 4C: 8.7 CM2
ECHO LA DIAMETER INDEX: 1.46 CM/M2
ECHO LA DIAMETER: 3 CM
ECHO LA MAJOR AXIS: 3.4 CM
ECHO LA MINOR AXIS: 3.3 CM
ECHO LA TO AORTIC ROOT RATIO: 1.11
ECHO LA VOL BP: 18 ML (ref 22–52)
ECHO LA VOL MOD A2C: 17 ML (ref 22–52)
ECHO LA VOL MOD A4C: 18 ML (ref 22–52)
ECHO LA VOL/BSA BIPLANE: 9 ML/M2 (ref 16–34)
ECHO LA VOLUME INDEX MOD A2C: 8 ML/M2 (ref 16–34)
ECHO LA VOLUME INDEX MOD A4C: 9 ML/M2 (ref 16–34)
ECHO LV E' LATERAL VELOCITY: 8.38 CM/S
ECHO LV E' SEPTAL VELOCITY: 5.77 CM/S
ECHO LV EDV A2C: 21 ML
ECHO LV EDV NDEX A2C: 10 ML/M2
ECHO LV EF PHYSICIAN: 65 %
ECHO LV INTERNAL DIMENSION DIASTOLE INDEX: 1.84 CM/M2
ECHO LV INTERNAL DIMENSION DIASTOLIC: 3.8 CM (ref 3.9–5.3)
ECHO LV IVSD: 1.1 CM (ref 0.6–0.9)
ECHO LV MASS 2D: 134.7 G (ref 67–162)
ECHO LV MASS INDEX 2D: 65.4 G/M2 (ref 43–95)
ECHO LV POSTERIOR WALL DIASTOLIC: 1.1 CM (ref 0.6–0.9)
ECHO LV RELATIVE WALL THICKNESS RATIO: 0.58
ECHO LVOT AREA: 3.1 CM2
ECHO LVOT AV VTI INDEX: 0.68
ECHO LVOT DIAM: 2 CM
ECHO LVOT MEAN GRADIENT: 1 MMHG
ECHO LVOT PEAK GRADIENT: 2 MMHG
ECHO LVOT PEAK VELOCITY: 0.8 M/S
ECHO LVOT STROKE VOLUME INDEX: 15.7 ML/M2
ECHO LVOT SV: 32.3 ML
ECHO LVOT VTI: 10.3 CM
ECHO MV A VELOCITY: 0.7 M/S
ECHO MV AREA VTI: 3 CM2
ECHO MV E DECELERATION TIME (DT): 206 MS
ECHO MV E VELOCITY: 0.49 M/S
ECHO MV E/A RATIO: 0.7
ECHO MV E/E' LATERAL: 5.85
ECHO MV E/E' RATIO (AVERAGED): 7.17
ECHO MV E/E' SEPTAL: 8.49
ECHO MV LVOT VTI INDEX: 1.06
ECHO MV MAX VELOCITY: 0.9 M/S
ECHO MV MEAN GRADIENT: 1 MMHG
ECHO MV MEAN VELOCITY: 0.5 M/S
ECHO MV PEAK GRADIENT: 3 MMHG
ECHO MV VTI: 10.9 CM
ECHO PV MAX VELOCITY: 1.1 M/S
ECHO PV PEAK GRADIENT: 5 MMHG
ECHO RIGHT VENTRICULAR SYSTOLIC PRESSURE (RVSP): 32 MMHG
ECHO RV BASAL DIMENSION: 1.6 CM
ECHO RV INTERNAL DIMENSION: 1.9 CM
ECHO RV MID DIMENSION: 1.6 CM
ECHO RV TAPSE: 1.8 CM (ref 1.7–?)
ECHO TV REGURGITANT MAX VELOCITY: 2.43 M/S
ECHO TV REGURGITANT PEAK GRADIENT: 24 MMHG
GFR, ESTIMATED: 90 ML/MIN/1.73M2
GLUCOSE SERPL-MCNC: 112 MG/DL (ref 74–99)
POTASSIUM SERPL-SCNC: 4.6 MMOL/L (ref 3.7–5.3)
SODIUM SERPL-SCNC: 137 MMOL/L (ref 136–145)

## 2025-07-11 PROCEDURE — 6370000000 HC RX 637 (ALT 250 FOR IP): Performed by: PSYCHIATRY & NEUROLOGY

## 2025-07-11 PROCEDURE — 6360000002 HC RX W HCPCS: Performed by: INTERNAL MEDICINE

## 2025-07-11 PROCEDURE — 6370000000 HC RX 637 (ALT 250 FOR IP): Performed by: STUDENT IN AN ORGANIZED HEALTH CARE EDUCATION/TRAINING PROGRAM

## 2025-07-11 PROCEDURE — 36415 COLL VENOUS BLD VENIPUNCTURE: CPT

## 2025-07-11 PROCEDURE — 99239 HOSP IP/OBS DSCHRG MGMT >30: CPT | Performed by: STUDENT IN AN ORGANIZED HEALTH CARE EDUCATION/TRAINING PROGRAM

## 2025-07-11 PROCEDURE — 2700000000 HC OXYGEN THERAPY PER DAY

## 2025-07-11 PROCEDURE — 93306 TTE W/DOPPLER COMPLETE: CPT | Performed by: INTERNAL MEDICINE

## 2025-07-11 PROCEDURE — 6370000000 HC RX 637 (ALT 250 FOR IP): Performed by: INTERNAL MEDICINE

## 2025-07-11 PROCEDURE — 80048 BASIC METABOLIC PNL TOTAL CA: CPT

## 2025-07-11 PROCEDURE — 93306 TTE W/DOPPLER COMPLETE: CPT

## 2025-07-11 PROCEDURE — 2500000003 HC RX 250 WO HCPCS: Performed by: NURSE PRACTITIONER

## 2025-07-11 PROCEDURE — 94761 N-INVAS EAR/PLS OXIMETRY MLT: CPT

## 2025-07-11 RX ORDER — PANTOPRAZOLE SODIUM 40 MG/1
40 TABLET, DELAYED RELEASE ORAL
Qty: 30 TABLET | Refills: 3 | Status: SHIPPED | OUTPATIENT
Start: 2025-07-11

## 2025-07-11 RX ORDER — HYDROCORTISONE 5 MG/1
15 TABLET ORAL DAILY
Qty: 90 TABLET | Refills: 0 | Status: SHIPPED | OUTPATIENT
Start: 2025-07-11

## 2025-07-11 RX ORDER — LEVOTHYROXINE SODIUM 150 UG/1
150 TABLET ORAL DAILY
Qty: 90 TABLET | Refills: 0 | Status: SHIPPED | OUTPATIENT
Start: 2025-07-11

## 2025-07-11 RX ORDER — PHENYTOIN SODIUM 300 MG/1
300 CAPSULE, EXTENDED RELEASE ORAL DAILY
Qty: 60 CAPSULE | Refills: 3 | Status: SHIPPED | OUTPATIENT
Start: 2025-07-12

## 2025-07-11 RX ORDER — PHENYTOIN SODIUM 300 MG/1
300 CAPSULE, EXTENDED RELEASE ORAL DAILY
Qty: 60 CAPSULE | Refills: 3 | OUTPATIENT
Start: 2025-07-12

## 2025-07-11 RX ORDER — CLOZAPINE 50 MG/1
50 TABLET ORAL NIGHTLY
Qty: 30 TABLET | Refills: 3 | Status: SHIPPED | OUTPATIENT
Start: 2025-07-11

## 2025-07-11 RX ORDER — FERROUS SULFATE 325(65) MG
325 TABLET ORAL 2 TIMES DAILY
Qty: 30 TABLET | Refills: 0 | Status: SHIPPED | OUTPATIENT
Start: 2025-07-11

## 2025-07-11 RX ORDER — LANOLIN ALCOHOL/MO/W.PET/CERES
1000 CREAM (GRAM) TOPICAL DAILY
Qty: 30 TABLET | Refills: 0 | Status: SHIPPED | OUTPATIENT
Start: 2025-07-11

## 2025-07-11 RX ORDER — HYDROCORTISONE 10 MG/1
5 TABLET ORAL DAILY
Qty: 15 TABLET | Refills: 0 | Status: SHIPPED | OUTPATIENT
Start: 2025-07-11

## 2025-07-11 RX ORDER — VENLAFAXINE HYDROCHLORIDE 75 MG/1
225 CAPSULE, EXTENDED RELEASE ORAL
Qty: 30 CAPSULE | Refills: 3 | Status: SHIPPED | OUTPATIENT
Start: 2025-07-11

## 2025-07-11 RX ORDER — FLUDROCORTISONE ACETATE 0.1 MG/1
0.1 TABLET ORAL DAILY
Qty: 30 TABLET | Refills: 0 | Status: SHIPPED | OUTPATIENT
Start: 2025-07-11 | End: 2025-08-10

## 2025-07-11 RX ORDER — FOLIC ACID 1 MG/1
1 TABLET ORAL DAILY
Qty: 30 TABLET | Refills: 0 | Status: SHIPPED | OUTPATIENT
Start: 2025-07-11

## 2025-07-11 RX ADMIN — HYDROCORTISONE SODIUM SUCCINATE 50 MG: 100 INJECTION, POWDER, FOR SOLUTION INTRAMUSCULAR; INTRAVENOUS at 01:16

## 2025-07-11 RX ADMIN — HYDROCORTISONE SODIUM SUCCINATE 50 MG: 100 INJECTION, POWDER, FOR SOLUTION INTRAMUSCULAR; INTRAVENOUS at 10:21

## 2025-07-11 RX ADMIN — PANTOPRAZOLE SODIUM 40 MG: 40 TABLET, DELAYED RELEASE ORAL at 05:02

## 2025-07-11 RX ADMIN — PHENYTOIN SODIUM 300 MG: 100 CAPSULE, EXTENDED RELEASE ORAL at 09:33

## 2025-07-11 RX ADMIN — VENLAFAXINE HYDROCHLORIDE 225 MG: 150 CAPSULE, EXTENDED RELEASE ORAL at 09:33

## 2025-07-11 RX ADMIN — FLUDROCORTISONE ACETATE 0.1 MG: 0.1 TABLET ORAL at 09:37

## 2025-07-11 RX ADMIN — LEVOTHYROXINE SODIUM 150 MCG: 0.07 TABLET ORAL at 05:02

## 2025-07-11 RX ADMIN — SODIUM CHLORIDE, PRESERVATIVE FREE 10 ML: 5 INJECTION INTRAVENOUS at 09:37

## 2025-07-11 RX ADMIN — CYANOCOBALAMIN TAB 500 MCG 1000 MCG: 500 TAB at 09:33

## 2025-07-11 RX ADMIN — FOLIC ACID 1 MG: 1 TABLET ORAL at 09:33

## 2025-07-11 NOTE — PROGRESS NOTES
Physical Therapy        Physical Therapy Cancel Note      DATE: 2025    NAME: Yaritza Esquivel  MRN: [id number]   : 1975      Patient not seen this date for Physical Therapy due to:    Patient independent with functional mobility. Will defer PT evaluation at this time. Please reorder PT if future needs arise.       Electronically signed by Federica Tomas PT on 2025 at 12:29 PM

## 2025-07-11 NOTE — DISCHARGE SUMMARY
[facility]  Office: [phone]  Magdy Mcdonnell DO, Flaco Torres DO, Riki Massey DO, Austin Trujillo DO, Cristal Bella MD, Radha Wetzel MD, Lucita Grider MD, Yue Arellano MD,  Randy Currie MD, Kathleen Scott MD, Yair Brian DO, Rocio Rocha MD,  Rebekah Mohan DO, Emory Goodman MD, Onofre Medina MD, Camron Mcdonnell DO, Shaina Chappell MD,  Guy Geronimo DO, Rosa Call MD, Ling Helton MD, Idalia Green MD, Kiara Everett MD,  Francis Jules MD, Jose Guajardo MD, Francesca Gaines MD, Pk Terry DO, Emmie Avila MD,  David Smith MD, Shirley Waterhouse, CNP,  Shanna Oneal, CNP, Tatyana Marion, CNP, Chester Virk, CNP,  Sonja Cross, DNP, Maryellen Avalos, CNP, Marlyn Greene, CNP, Nicolasa Olsen, CNP, Cecily Beauchamp, CNP, Stacey Childers, CNP, Denzel Rasheed PA-C, Jagruti Perez, CNS, Maria C Chávez, CNP, Ainsley Vega, CNP         [facility]   IN-PATIENT SERVICE   [facility]    Discharge Summary     Patient ID: Yaritza Esquivel  :  1975   MRN: [id number]     ACCOUNT:  [id number]   Patient's PCP: Otis Acosta MD  Admit Date: 2025   Discharge Date: 2025  Length of Stay: 2  Code Status:  Full Code  Admitting Physician: Darren Wade MD  Discharge Physician: Darren Wade MD     Active Discharge Diagnoses:     Hospital Problem Lists:  Principal Problem (Resolved):    Seizure (HCC)  Active Problems:    Bipolar depression (HCC)    Schizophrenia (HCC)    Major depressive disorder, recurrent severe without psychotic features (HCC)    Hypothyroidism in adult      Admission Condition:  fair     Discharged Condition: good    Hospital Stay:     Hospital Course:  Yaritza Esquivel is a 50 y.o. female who was admitted for the management of Seizure (HCC) , presented to ER with Seizures    As per documentation  The patient is a 50 y.o.  Non- / non  female who presents with Seizures   and she is admitted to the  [facility] for the management of  Seizure ([facility]).  Patient was brought to emergency room by ambulance.  [facility]  Patient has underlying history of depression disorder, been with thyroidectomy, parathyroidectomy, bilateral adrenalectomy with postsurgical hypothyroidism adrenal insufficiency and type 2 diabetes mellitus.  Patient was noted to have seizure-like activity at the group home.  She had been seen in the emergency room multiple times in the last several months with depression, suicidal ideation.  Evaluation showed temperature 36.6 °C, hypotension 108/87 mmHg.  Heart rate was 83.  Lab evaluation showed normal electrolytes, normal LFT, , T4  was 0.4.  Labs have been repeated and showed TSH 39.6, T4 free 0.6.  CT head was obtained and showed no intracranial acute findings with chronic colloid cyst 5 mm and third ventricle without any hydrocephalus.  MRI brain was recommended.    Neurology evaluated the patient and recommended outpatient follow-up in 4 weeks.  Driving restrictions.  Echocardiogram was done and patient was discharged in stable condition to facility.      Physical Examination:      General appearance:  alert, cooperative and no distress  Mental Status:  oriented to person, place and time and normal affect  Lungs:  clear to auscultation bilaterally, normal effort  Heart:  regular rate and rhythm, no murmur  Abdomen:  soft, nontender, nondistended, normal bowel sounds, no masses, hepatomegaly, splenomegaly  Extremities:  no edema, redness, tenderness in the calves  Skin:  no gross lesions, rashes, induration    Significant therapeutic interventions:   Seizure -known seizure disorder with breakthrough seizure.  Unclear if patient had been on antiepileptic medication.  Fentanyl level 9.9.  Neurology on board, s/p EEG negative for epileptiform discharges.  Awaiting neurorecommendations.  Continue home medications.  Seizure and fall precautions.

## 2025-07-11 NOTE — PROGRESS NOTES
Comprehensive Nutrition Assessment    Type and Reason for Visit:  Initial, Positive nutrition screen    Nutrition Recommendations/Plan:   Continue regular diet  Decrease Ensure to BID     Malnutrition Assessment:  Malnutrition Status:  Insufficient data (07/11/25 1603)    Context:  Chronic Illness     Findings of the 6 clinical characteristics of malnutrition:  Energy Intake:  75% or less estimated energy requirements for 1 month or longer (predicted)  Weight Loss:  Greater than 7.5% over 3 months     Body Fat Loss:  No body fat loss     Muscle Mass Loss:  Unable to assess    Fluid Accumulation:  Unable to assess     Strength:  Not Performed    Nutrition Assessment:    Pt seen for RNC for weight loss. 49 yo F adm for management of seizure. PMHx significant for Bipolar depression, MDD, schizophrenia. EMR weight hx indicates significant 9.8% weight loss over 3 months. Pt reports decrease in appetite and intake PTA. Eating well this admission. % of meals per nursing. Noted 80% of breakfast consumed at visit, 100% of Ensure. Has 2 Ensure unopened at bedside. Agreeable to decrease frequency to BID. Encouraged pt to have healthful snacks available or Ensures between meals.    Nutrition Related Findings:    Meds/labs reviewed.  Meds include Folic acid, Bit B12, Solu Cortef Wound Type: None       Current Nutrition Intake & Therapies:    Average Meal Intake: %  Average Supplements Intake: %  ADULT ORAL NUTRITION SUPPLEMENT; Breakfast, Dinner; Standard High Calorie/High Protein Oral Supplement  ADULT DIET; Regular; No Beef    Anthropometric Measures:  Height: 183 cm (6' 0.05\")  Ideal Body Weight (IBW): 160 lbs (73 kg)    Current Body Weight: 83.9 kg (184 lb 15.5 oz), 115.6 % IBW. Weight Source: Stated  Current BMI (kg/m2): 25.1  BMI Categories: Overweight (BMI 25.0-29.9)    Estimated Daily Nutrient Needs:  Energy Requirements Based On: Kcal/kg  Weight Used for Energy Requirements: Current  Energy  (kcal/day): 3510-6786 kcal/d  Weight Used for Protein Requirements: Current  Protein (g/day): 70-85 gm/d  Method Used for Fluid Requirements: 1 ml/kcal  Fluid (ml/day): 9214-7379 mL/d    Nutrition Diagnosis:   Inadequate oral intake related to decreased appetite as evidenced by poor intake prior to admission, weight loss    Nutrition Interventions:   Food and/or Nutrient Delivery: Continue Current Diet, Modify Oral Nutrition Supplement  Nutrition Education/Counseling: No recommendation at this time  Coordination of Nutrition Care: Continue to monitor while inpatient  Plan of Care discussed with: pt, RN    Goals:  Goals: Maintain adequate nutrition status, prior to discharge  Type of Goal: New goal  Previous Goal Met: New Goal    Nutrition Monitoring and Evaluation:   Behavioral-Environmental Outcomes: None Identified  Food/Nutrient Intake Outcomes: Food and Nutrient Intake, Supplement Intake  Physical Signs/Symptoms Outcomes: Biochemical Data, Nutrition Focused Physical Findings, Weight, GI Status, Fluid Status or Edema    Discharge Planning:    Continue current diet (ONS or snacks as needed)     Gerri Celestin RD  Contact: [phone]

## 2025-07-11 NOTE — CARE COORDINATION
Case Management   Daily Progress Note       Patient Name: Yaritza Esquivel                   YOB: 1975  Diagnosis: Seizure (HCC) [R56.9]  Hypothyroid [E03.9]  Hypothyroidism in adult [E03.9]  Hypothyroidism, unspecified type [E03.9]                       GMLOS: 3.8 days  Length of Stay: 2  days    Anticipated Discharge Date: Discharge pending    Readmission Risk (Low < 19, Mod (19-27), High > 27): Readmission Risk Score: 31.3        Current Transitional Plan    [x] Home Independently    [] Home with HC    [] Skilled Nursing Facility    [] Acute Rehabilitation    [] Long Term Acute Care (LTAC)    [x] Other: [facility]    Plan for the Stay (Medical Management) : Cab ride, medical necessity letter for 1st floor bed          Workflow Continuation (Additional Notes) : Writer spoke to Frida from [facility] and inquired about patient's request for 1st floor bed, Frida informed that patient just needed a letter from doctor stating the medical necessity. Writer updated patient and will await discharge order before setting up cab ride for patient.    1130 Writer PS messaged Dr. Wade for letter of necessity for 1st floor bed.    1645 Cab called through patient's insurance, insurance would not accept because [facility] address not able to verify through insurance. Cab called through [employer] through charitable fund.        Autumn Freeman  July 11, 2025

## 2025-07-11 NOTE — PLAN OF CARE
Problem: Chronic Conditions and Co-morbidities  Goal: Patient's chronic conditions and co-morbidity symptoms are monitored and maintained or improved  7/11/2025 1759 by Caroline Mcdermott RN  Outcome: Adequate for Discharge  Flowsheets (Taken 7/11/2025 0831)  Care Plan - Patient's Chronic Conditions and Co-Morbidity Symptoms are Monitored and Maintained or Improved: Monitor and assess patient's chronic conditions and comorbid symptoms for stability, deterioration, or improvement  7/11/2025 0425 by Randy Sousa RN  Outcome: Progressing     Problem: Seizure Precautions  Goal: Remains free of injury related to seizures activity  7/11/2025 1759 by Caroline Mcdermott RN  Outcome: Adequate for Discharge  7/11/2025 0425 by Randy Sousa RN  Outcome: Progressing  Flowsheets (Taken 7/10/2025 1900)  Remains free of injury related to seizure activity: Monitor patient for seizure activity, document and report duration and description of seizure to Licensed Independent Practitioner     Problem: Pain  Goal: Verbalizes/displays adequate comfort level or baseline comfort level  7/11/2025 1759 by Caroline Mcdermott RN  Outcome: Adequate for Discharge  7/11/2025 0425 by Randy Sousa RN  Outcome: Progressing     Problem: Safety - Adult  Goal: Free from fall injury  7/11/2025 1759 by Caroline Mcdermott RN  Outcome: Adequate for Discharge  7/11/2025 0425 by Randy Sousa RN  Outcome: Progressing     Problem: Neurosensory - Adult  Goal: Achieves stable or improved neurological status  7/11/2025 1759 by Caroline Mcdermott RN  Outcome: Adequate for Discharge  Flowsheets (Taken 7/11/2025 0831)  Achieves stable or improved neurological status: Assess for and report changes in neurological status  7/11/2025 0425 by Randy Sousa RN  Outcome: Progressing  Goal: Absence of seizures  7/11/2025 1759 by Caroline Mcdermott RN  Outcome: Adequate for Discharge  Flowsheets (Taken 7/11/2025 0831)  Absence of  Infection - Adult  Goal: Absence of infection at discharge  7/11/2025 1759 by Caroline Mcdermott RN  Outcome: Adequate for Discharge  7/11/2025 0425 by Randy Sousa RN  Outcome: Progressing     Problem: Metabolic/Fluid and Electrolytes - Adult  Goal: Electrolytes maintained within normal limits  7/11/2025 1759 by Caroline Mcdermott RN  Outcome: Adequate for Discharge  7/11/2025 0425 by Randy Sousa RN  Outcome: Progressing  Goal: Hemodynamic stability and optimal renal function maintained  7/11/2025 1759 by Caroline Mcdermott RN  Outcome: Adequate for Discharge  7/11/2025 0425 by Randy Sousa RN  Outcome: Progressing     Problem: Hematologic - Adult  Goal: Maintains hematologic stability  7/11/2025 1759 by Caroline Mcdermott RN  Outcome: Adequate for Discharge  7/11/2025 0425 by Randy Sousa RN  Outcome: Progressing     Problem: Discharge Planning  Goal: Discharge to home or other facility with appropriate resources  7/11/2025 1759 by Caroline Mcdermott RN  Outcome: Adequate for Discharge  Flowsheets (Taken 7/11/2025 0831)  Discharge to home or other facility with appropriate resources: Identify barriers to discharge with patient and caregiver  7/11/2025 0425 by Randy Sousa RN  Outcome: Progressing

## 2025-07-11 NOTE — PLAN OF CARE
Problem: Chronic Conditions and Co-morbidities  Goal: Patient's chronic conditions and co-morbidity symptoms are monitored and maintained or improved  7/11/2025 0425 by Randy Sousa RN  Outcome: Progressing  7/10/2025 1431 by Kvng Gore RN  Outcome: Progressing     Problem: Seizure Precautions  Goal: Remains free of injury related to seizures activity  7/11/2025 0425 by Randy Sousa RN  Outcome: Progressing  Flowsheets (Taken 7/10/2025 1900)  Remains free of injury related to seizure activity: Monitor patient for seizure activity, document and report duration and description of seizure to Licensed Independent Practitioner  7/10/2025 1431 by Kvng Gore RN  Outcome: Progressing     Problem: Pain  Goal: Verbalizes/displays adequate comfort level or baseline comfort level  7/11/2025 0425 by Randy Sousa RN  Outcome: Progressing  7/10/2025 1431 by Kvng Gore RN  Outcome: Progressing     Problem: Safety - Adult  Goal: Free from fall injury  7/11/2025 0425 by Randy Sousa RN  Outcome: Progressing  7/10/2025 1431 by Kvng Gore RN  Outcome: Progressing     Problem: Neurosensory - Adult  Goal: Achieves stable or improved neurological status  7/11/2025 0425 by Randy Sousa RN  Outcome: Progressing  7/10/2025 1431 by Kvng Gore RN  Outcome: Progressing  Goal: Absence of seizures  7/11/2025 0425 by Randy Sousa RN  Outcome: Progressing  7/10/2025 1431 by Kvng Gore RN  Outcome: Progressing     Problem: Cardiovascular - Adult  Goal: Maintains optimal cardiac output and hemodynamic stability  7/11/2025 0425 by Randy Sousa RN  Outcome: Progressing  7/10/2025 1431 by Kvng Gore RN  Outcome: Progressing  Goal: Absence of cardiac dysrhythmias or at baseline  7/11/2025 0425 by Randy Sousa RN  Outcome: Progressing  7/10/2025 1431 by Kvng Gore RN  Outcome: Progressing     Problem: Skin/Tissue Integrity - Adult  Goal: Skin integrity remains

## 2025-07-11 NOTE — DISCHARGE INSTR - COC
Continuity of Care Form    Patient Name: Yaritza Esquivel   :  1975  MRN:  [id number]    Admit date:  2025  Discharge date:  ***    Code Status Order: Full Code   Advance Directives:     Admitting Physician:  Darren Wade MD  PCP: Otis Acosta MD    Discharging Nurse: ***  Discharging Hospital Unit/Room#: 0436/0436-01  Discharging Unit Phone Number: ***    Emergency Contact:   No emergency contact information on file.    Past Surgical History:  Past Surgical History:   Procedure Laterality Date    THYROIDECTOMY      TONSILLECTOMY      TOTAL HIP ARTHROPLASTY Right        Immunization History:   Immunization History   Administered Date(s) Administered    COVID-19, PFIZER PURPLE top, DILUTE for use, (age 12 y+), 30mcg/0.3mL 2021    Influenza, AFLURIA, FLUZONE, (age4 y+), IM, Trivalent MDV, 0.5mL 10/23/2024       Active Problems:  Patient Active Problem List   Diagnosis Code    Hypothyroidism E03.9    Schizophrenia ([facility]) F20.9    Bipolar depression ([facility]) F31.9    Depression F32.A    Depression with suicidal ideation F32.A, R45.851    Major depressive disorder, recurrent severe without psychotic features ([facility]) F33.2    Chronic midline low back pain without sciatica M54.50, G89.29    Scoliosis of lumbar spine M41.9    PTSD (post-traumatic stress disorder) F43.10    Suicidal ideation R45.851    Hypothyroidism in adult E03.9       Isolation/Infection:   Isolation            No Isolation          Patient Infection Status    None to display              Nurse Assessment:  Last Vital Signs: /81   Pulse 91   Temp 98.4 °F (36.9 °C) (Oral)   Resp 23   Ht 1.829 m (6' 0.01\")   Wt 83.9 kg (185 lb)   LMP 2025   SpO2 90%   BMI 25.08 kg/m²     Last documented pain score (0-10 scale): Pain Level: 0  Last Weight:   Wt Readings from Last 1 Encounters:   25 83.9 kg (185 lb)     Mental Status:  {IP PT MENTAL STATUS:}    IV Access:  { DIONISIO IV ACCESS:[id number]}    Nursing

## 2025-07-18 ENCOUNTER — CARE COORDINATION (OUTPATIENT)
Dept: CARE COORDINATION | Age: 50
End: 2025-07-18

## 2025-07-28 ENCOUNTER — CARE COORDINATION (OUTPATIENT)
Dept: CARE COORDINATION | Age: 50
End: 2025-07-28

## 2025-08-01 ENCOUNTER — HOSPITAL ENCOUNTER (INPATIENT)
Age: 50
LOS: 12 days | Discharge: HOME | DRG: 885 | End: 2025-08-13
Attending: EMERGENCY MEDICINE | Admitting: PSYCHIATRY & NEUROLOGY
Payer: COMMERCIAL

## 2025-08-01 DIAGNOSIS — R45.851 DEPRESSION WITH SUICIDAL IDEATION: Primary | ICD-10-CM

## 2025-08-01 DIAGNOSIS — F32.A DEPRESSION WITH SUICIDAL IDEATION: Primary | ICD-10-CM

## 2025-08-01 LAB
ALBUMIN SERPL-MCNC: 4.3 G/DL (ref 3.5–5.2)
ALP SERPL-CCNC: 202 U/L (ref 35–104)
ALT SERPL-CCNC: 113 U/L (ref 10–35)
ANION GAP SERPL CALCULATED.3IONS-SCNC: 12 MMOL/L (ref 9–16)
APAP SERPL-MCNC: <5 UG/ML (ref 10–30)
AST SERPL-CCNC: 47 U/L (ref 10–35)
BASOPHILS # BLD: 0.07 K/UL (ref 0–0.2)
BASOPHILS NFR BLD: 1 % (ref 0–2)
BILIRUB SERPL-MCNC: <0.2 MG/DL (ref 0–1.2)
BUN SERPL-MCNC: 24 MG/DL (ref 6–20)
CALCIUM SERPL-MCNC: 10.4 MG/DL (ref 8.6–10.4)
CHLORIDE SERPL-SCNC: 101 MMOL/L (ref 98–107)
CO2 SERPL-SCNC: 23 MMOL/L (ref 20–31)
CREAT SERPL-MCNC: 0.8 MG/DL (ref 0.7–1.2)
EOSINOPHIL # BLD: 0.38 K/UL (ref 0–0.44)
EOSINOPHILS RELATIVE PERCENT: 4 % (ref 0–4)
ERYTHROCYTE [DISTWIDTH] IN BLOOD BY AUTOMATED COUNT: 14.6 % (ref 11.5–14.9)
ETHANOL PERCENT: 0 %
ETHANOLAMINE SERPL-MCNC: <10 MG/DL (ref 0–0.08)
GFR, ESTIMATED: 90 ML/MIN/1.73M2
GLUCOSE BLD-MCNC: 98 MG/DL (ref 65–105)
GLUCOSE SERPL-MCNC: 96 MG/DL (ref 74–99)
HCT VFR BLD AUTO: 41.5 % (ref 36–46)
HGB BLD-MCNC: 13.8 G/DL (ref 12–16)
IMM GRANULOCYTES # BLD AUTO: 0.05 K/UL (ref 0–0.3)
IMM GRANULOCYTES NFR BLD: 1 %
LYMPHOCYTES NFR BLD: 2.3 K/UL (ref 1.1–3.7)
LYMPHOCYTES RELATIVE PERCENT: 24 % (ref 24–44)
MAGNESIUM SERPL-MCNC: 2.1 MG/DL (ref 1.6–2.6)
MCH RBC QN AUTO: 29.9 PG (ref 26–34)
MCHC RBC AUTO-ENTMCNC: 33.3 G/DL (ref 31–37)
MCV RBC AUTO: 89.8 FL (ref 80–100)
MONOCYTES NFR BLD: 0.72 K/UL (ref 0.1–1.2)
MONOCYTES NFR BLD: 8 % (ref 3–12)
NEUTROPHILS NFR BLD: 62 % (ref 36–66)
NEUTS SEG NFR BLD: 6.14 K/UL (ref 1.5–8.1)
NRBC BLD-RTO: 0 PER 100 WBC
PLATELET # BLD AUTO: 444 K/UL (ref 150–450)
PMV BLD AUTO: 8.8 FL (ref 8–13.5)
POTASSIUM SERPL-SCNC: 4.5 MMOL/L (ref 3.7–5.3)
PROT SERPL-MCNC: 8 G/DL (ref 6.6–8.7)
RBC # BLD AUTO: 4.62 M/UL (ref 3.95–5.11)
SALICYLATES SERPL-MCNC: <1 MG/DL (ref 0–10)
SODIUM SERPL-SCNC: 136 MMOL/L (ref 136–145)
WBC OTHER # BLD: 9.7 K/UL (ref 3.5–11)

## 2025-08-01 PROCEDURE — G0480 DRUG TEST DEF 1-7 CLASSES: HCPCS

## 2025-08-01 PROCEDURE — 82947 ASSAY GLUCOSE BLOOD QUANT: CPT

## 2025-08-01 PROCEDURE — 1240000000 HC EMOTIONAL WELLNESS R&B

## 2025-08-01 PROCEDURE — 99285 EMERGENCY DEPT VISIT HI MDM: CPT

## 2025-08-01 PROCEDURE — 80053 COMPREHEN METABOLIC PANEL: CPT

## 2025-08-01 PROCEDURE — 83036 HEMOGLOBIN GLYCOSYLATED A1C: CPT

## 2025-08-01 PROCEDURE — 85025 COMPLETE CBC W/AUTO DIFF WBC: CPT

## 2025-08-01 PROCEDURE — 80179 DRUG ASSAY SALICYLATE: CPT

## 2025-08-01 PROCEDURE — 36415 COLL VENOUS BLD VENIPUNCTURE: CPT

## 2025-08-01 PROCEDURE — 80143 DRUG ASSAY ACETAMINOPHEN: CPT

## 2025-08-01 PROCEDURE — 83735 ASSAY OF MAGNESIUM: CPT

## 2025-08-01 RX ORDER — HALOPERIDOL 5 MG/ML
5 INJECTION INTRAMUSCULAR EVERY 6 HOURS PRN
Status: DISCONTINUED | OUTPATIENT
Start: 2025-08-01 | End: 2025-08-13 | Stop reason: HOSPADM

## 2025-08-01 RX ORDER — DIPHENHYDRAMINE HYDROCHLORIDE 50 MG/ML
50 INJECTION, SOLUTION INTRAMUSCULAR; INTRAVENOUS EVERY 6 HOURS PRN
Status: DISCONTINUED | OUTPATIENT
Start: 2025-08-01 | End: 2025-08-13 | Stop reason: HOSPADM

## 2025-08-01 RX ORDER — ACETAMINOPHEN 325 MG/1
650 TABLET ORAL EVERY 6 HOURS PRN
Status: DISCONTINUED | OUTPATIENT
Start: 2025-08-01 | End: 2025-08-13 | Stop reason: HOSPADM

## 2025-08-01 RX ORDER — HYDROXYZINE HYDROCHLORIDE 50 MG/1
50 TABLET, FILM COATED ORAL 3 TIMES DAILY PRN
Status: DISCONTINUED | OUTPATIENT
Start: 2025-08-01 | End: 2025-08-13 | Stop reason: HOSPADM

## 2025-08-01 RX ORDER — POLYETHYLENE GLYCOL 3350 17 G
2 POWDER IN PACKET (EA) ORAL
Status: DISCONTINUED | OUTPATIENT
Start: 2025-08-01 | End: 2025-08-01

## 2025-08-01 RX ORDER — MAGNESIUM HYDROXIDE/ALUMINUM HYDROXICE/SIMETHICONE 120; 1200; 1200 MG/30ML; MG/30ML; MG/30ML
30 SUSPENSION ORAL EVERY 6 HOURS PRN
Status: DISCONTINUED | OUTPATIENT
Start: 2025-08-01 | End: 2025-08-13 | Stop reason: HOSPADM

## 2025-08-01 RX ORDER — HALOPERIDOL 5 MG/1
5 TABLET ORAL EVERY 6 HOURS PRN
Status: DISCONTINUED | OUTPATIENT
Start: 2025-08-01 | End: 2025-08-13 | Stop reason: HOSPADM

## 2025-08-01 RX ORDER — TRAZODONE HYDROCHLORIDE 50 MG/1
50 TABLET ORAL NIGHTLY PRN
Status: DISCONTINUED | OUTPATIENT
Start: 2025-08-02 | End: 2025-08-13 | Stop reason: HOSPADM

## 2025-08-01 RX ORDER — IBUPROFEN 400 MG/1
400 TABLET, FILM COATED ORAL EVERY 6 HOURS PRN
Status: DISCONTINUED | OUTPATIENT
Start: 2025-08-01 | End: 2025-08-13 | Stop reason: HOSPADM

## 2025-08-01 RX ORDER — POLYETHYLENE GLYCOL 3350 17 G/17G
17 POWDER, FOR SOLUTION ORAL DAILY PRN
Status: DISCONTINUED | OUTPATIENT
Start: 2025-08-01 | End: 2025-08-13 | Stop reason: HOSPADM

## 2025-08-01 ASSESSMENT — PATIENT HEALTH QUESTIONNAIRE - PHQ9
SUM OF ALL RESPONSES TO PHQ QUESTIONS 1-9: 13
5. POOR APPETITE OR OVEREATING: SEVERAL DAYS
3. TROUBLE FALLING OR STAYING ASLEEP: MORE THAN HALF THE DAYS
SUM OF ALL RESPONSES TO PHQ QUESTIONS 1-9: 13
6. FEELING BAD ABOUT YOURSELF - OR THAT YOU ARE A FAILURE OR HAVE LET YOURSELF OR YOUR FAMILY DOWN: SEVERAL DAYS
7. TROUBLE CONCENTRATING ON THINGS, SUCH AS READING THE NEWSPAPER OR WATCHING TELEVISION: NOT AT ALL
1. LITTLE INTEREST OR PLEASURE IN DOING THINGS: MORE THAN HALF THE DAYS
4. FEELING TIRED OR HAVING LITTLE ENERGY: MORE THAN HALF THE DAYS
8. MOVING OR SPEAKING SO SLOWLY THAT OTHER PEOPLE COULD HAVE NOTICED. OR THE OPPOSITE, BEING SO FIGETY OR RESTLESS THAT YOU HAVE BEEN MOVING AROUND A LOT MORE THAN USUAL: MORE THAN HALF THE DAYS
10. IF YOU CHECKED OFF ANY PROBLEMS, HOW DIFFICULT HAVE THESE PROBLEMS MADE IT FOR YOU TO DO YOUR WORK, TAKE CARE OF THINGS AT HOME, OR GET ALONG WITH OTHER PEOPLE: VERY DIFFICULT
9. THOUGHTS THAT YOU WOULD BE BETTER OFF DEAD, OR OF HURTING YOURSELF: SEVERAL DAYS
SUM OF ALL RESPONSES TO PHQ QUESTIONS 1-9: 12
2. FEELING DOWN, DEPRESSED OR HOPELESS: MORE THAN HALF THE DAYS
SUM OF ALL RESPONSES TO PHQ QUESTIONS 1-9: 13

## 2025-08-01 ASSESSMENT — SLEEP AND FATIGUE QUESTIONNAIRES
AVERAGE NUMBER OF SLEEP HOURS: 3.5
DO YOU HAVE DIFFICULTY SLEEPING: YES
DO YOU USE A SLEEP AID: YES
SLEEP PATTERN: INSOMNIA;DIFFICULTY FALLING ASLEEP

## 2025-08-01 ASSESSMENT — PAIN - FUNCTIONAL ASSESSMENT: PAIN_FUNCTIONAL_ASSESSMENT: NONE - DENIES PAIN

## 2025-08-01 ASSESSMENT — LIFESTYLE VARIABLES
HOW OFTEN DO YOU HAVE A DRINK CONTAINING ALCOHOL: NEVER
HOW OFTEN DO YOU HAVE A DRINK CONTAINING ALCOHOL: NEVER
HOW MANY STANDARD DRINKS CONTAINING ALCOHOL DO YOU HAVE ON A TYPICAL DAY: PATIENT DOES NOT DRINK
HOW MANY STANDARD DRINKS CONTAINING ALCOHOL DO YOU HAVE ON A TYPICAL DAY: PATIENT DOES NOT DRINK

## 2025-08-02 LAB
GLUCOSE BLD-MCNC: 100 MG/DL (ref 65–105)
GLUCOSE BLD-MCNC: 135 MG/DL (ref 65–105)
GLUCOSE BLD-MCNC: 150 MG/DL (ref 65–105)
GLUCOSE BLD-MCNC: 99 MG/DL (ref 65–105)

## 2025-08-02 PROCEDURE — 82947 ASSAY GLUCOSE BLOOD QUANT: CPT

## 2025-08-02 PROCEDURE — 99222 1ST HOSP IP/OBS MODERATE 55: CPT | Performed by: PSYCHIATRY & NEUROLOGY

## 2025-08-02 PROCEDURE — 1240000000 HC EMOTIONAL WELLNESS R&B

## 2025-08-02 PROCEDURE — 6370000000 HC RX 637 (ALT 250 FOR IP): Performed by: PSYCHIATRY & NEUROLOGY

## 2025-08-02 PROCEDURE — 99222 1ST HOSP IP/OBS MODERATE 55: CPT | Performed by: INTERNAL MEDICINE

## 2025-08-02 PROCEDURE — 6370000000 HC RX 637 (ALT 250 FOR IP): Performed by: INTERNAL MEDICINE

## 2025-08-02 RX ORDER — FOLIC ACID 1 MG/1
1 TABLET ORAL DAILY
Status: DISCONTINUED | OUTPATIENT
Start: 2025-08-02 | End: 2025-08-13 | Stop reason: HOSPADM

## 2025-08-02 RX ORDER — LEVOTHYROXINE SODIUM 150 UG/1
150 TABLET ORAL DAILY
Status: DISCONTINUED | OUTPATIENT
Start: 2025-08-02 | End: 2025-08-13 | Stop reason: HOSPADM

## 2025-08-02 RX ORDER — FERROUS SULFATE 325(65) MG
325 TABLET ORAL 2 TIMES DAILY
Status: DISCONTINUED | OUTPATIENT
Start: 2025-08-02 | End: 2025-08-13 | Stop reason: HOSPADM

## 2025-08-02 RX ORDER — BENZOCAINE/MENTHOL 6 MG-10 MG
LOZENGE MUCOUS MEMBRANE 2 TIMES DAILY
Status: DISCONTINUED | OUTPATIENT
Start: 2025-08-02 | End: 2025-08-13 | Stop reason: HOSPADM

## 2025-08-02 RX ORDER — HYDROCORTISONE 10 MG/1
5 TABLET ORAL DAILY
Status: DISCONTINUED | OUTPATIENT
Start: 2025-08-02 | End: 2025-08-02 | Stop reason: DRUGHIGH

## 2025-08-02 RX ORDER — PANTOPRAZOLE SODIUM 40 MG/1
40 TABLET, DELAYED RELEASE ORAL
Status: DISCONTINUED | OUTPATIENT
Start: 2025-08-03 | End: 2025-08-13 | Stop reason: HOSPADM

## 2025-08-02 RX ORDER — FLUDROCORTISONE ACETATE 0.1 MG/1
0.1 TABLET ORAL DAILY
Status: DISCONTINUED | OUTPATIENT
Start: 2025-08-02 | End: 2025-08-13 | Stop reason: HOSPADM

## 2025-08-02 RX ORDER — HYDROCORTISONE 10 MG/1
15 TABLET ORAL DAILY
Status: DISCONTINUED | OUTPATIENT
Start: 2025-08-02 | End: 2025-08-13 | Stop reason: HOSPADM

## 2025-08-02 RX ORDER — LANOLIN ALCOHOL/MO/W.PET/CERES
1000 CREAM (GRAM) TOPICAL DAILY
Status: DISCONTINUED | OUTPATIENT
Start: 2025-08-02 | End: 2025-08-13 | Stop reason: HOSPADM

## 2025-08-02 RX ORDER — CLOZAPINE 25 MG/1
50 TABLET ORAL NIGHTLY
Status: DISCONTINUED | OUTPATIENT
Start: 2025-08-02 | End: 2025-08-04

## 2025-08-02 RX ADMIN — HYDROCORTISONE: 1 CREAM TOPICAL at 20:53

## 2025-08-02 RX ADMIN — FOLIC ACID 1 MG: 1 TABLET ORAL at 12:21

## 2025-08-02 RX ADMIN — PHENYTOIN SODIUM 300 MG: 100 CAPSULE, EXTENDED RELEASE ORAL at 12:17

## 2025-08-02 RX ADMIN — FERROUS SULFATE TAB 325 MG (65 MG ELEMENTAL FE) 325 MG: 325 (65 FE) TAB at 20:53

## 2025-08-02 RX ADMIN — FERROUS SULFATE TAB 325 MG (65 MG ELEMENTAL FE) 325 MG: 325 (65 FE) TAB at 12:21

## 2025-08-02 RX ADMIN — HYDROCORTISONE 5 MG: 10 TABLET ORAL at 12:20

## 2025-08-02 RX ADMIN — CLOZAPINE 50 MG: 25 TABLET ORAL at 20:53

## 2025-08-02 RX ADMIN — FLUDROCORTISONE ACETATE 0.1 MG: 0.1 TABLET ORAL at 12:14

## 2025-08-02 RX ADMIN — HYDROCORTISONE 15 MG: 10 TABLET ORAL at 12:13

## 2025-08-02 RX ADMIN — CYANOCOBALAMIN TAB 1000 MCG 1000 MCG: 1000 TAB at 12:21

## 2025-08-02 RX ADMIN — LEVOTHYROXINE SODIUM 150 MCG: 0.15 TABLET ORAL at 12:15

## 2025-08-02 ASSESSMENT — LIFESTYLE VARIABLES
HOW OFTEN DO YOU HAVE A DRINK CONTAINING ALCOHOL: NEVER
HOW MANY STANDARD DRINKS CONTAINING ALCOHOL DO YOU HAVE ON A TYPICAL DAY: PATIENT DOES NOT DRINK
HOW OFTEN DO YOU HAVE A DRINK CONTAINING ALCOHOL: NEVER
HOW MANY STANDARD DRINKS CONTAINING ALCOHOL DO YOU HAVE ON A TYPICAL DAY: PATIENT DOES NOT DRINK

## 2025-08-03 LAB
EST. AVERAGE GLUCOSE BLD GHB EST-MCNC: 105 MG/DL
HBA1C MFR BLD: 5.3 % (ref 4–6)

## 2025-08-03 PROCEDURE — 1240000000 HC EMOTIONAL WELLNESS R&B

## 2025-08-03 PROCEDURE — 6370000000 HC RX 637 (ALT 250 FOR IP): Performed by: INTERNAL MEDICINE

## 2025-08-03 PROCEDURE — 6370000000 HC RX 637 (ALT 250 FOR IP): Performed by: PSYCHIATRY & NEUROLOGY

## 2025-08-03 PROCEDURE — 99232 SBSQ HOSP IP/OBS MODERATE 35: CPT | Performed by: PSYCHIATRY & NEUROLOGY

## 2025-08-03 PROCEDURE — 99232 SBSQ HOSP IP/OBS MODERATE 35: CPT | Performed by: INTERNAL MEDICINE

## 2025-08-03 RX ADMIN — CYANOCOBALAMIN TAB 1000 MCG 1000 MCG: 1000 TAB at 09:55

## 2025-08-03 RX ADMIN — PANTOPRAZOLE SODIUM 40 MG: 40 TABLET, DELAYED RELEASE ORAL at 06:35

## 2025-08-03 RX ADMIN — HYDROCORTISONE: 1 CREAM TOPICAL at 10:54

## 2025-08-03 RX ADMIN — FERROUS SULFATE TAB 325 MG (65 MG ELEMENTAL FE) 325 MG: 325 (65 FE) TAB at 09:55

## 2025-08-03 RX ADMIN — HYDROCORTISONE 15 MG: 10 TABLET ORAL at 09:56

## 2025-08-03 RX ADMIN — PHENYTOIN SODIUM 300 MG: 100 CAPSULE, EXTENDED RELEASE ORAL at 09:52

## 2025-08-03 RX ADMIN — CLOZAPINE 50 MG: 25 TABLET ORAL at 21:09

## 2025-08-03 RX ADMIN — FERROUS SULFATE TAB 325 MG (65 MG ELEMENTAL FE) 325 MG: 325 (65 FE) TAB at 21:09

## 2025-08-03 RX ADMIN — FLUDROCORTISONE ACETATE 0.1 MG: 0.1 TABLET ORAL at 09:54

## 2025-08-03 RX ADMIN — FOLIC ACID 1 MG: 1 TABLET ORAL at 09:55

## 2025-08-03 RX ADMIN — VENLAFAXINE HYDROCHLORIDE 225 MG: 150 CAPSULE, EXTENDED RELEASE ORAL at 09:54

## 2025-08-03 RX ADMIN — LEVOTHYROXINE SODIUM 150 MCG: 0.15 TABLET ORAL at 09:55

## 2025-08-04 LAB
ALBUMIN SERPL-MCNC: 4.2 G/DL (ref 3.5–5.2)
ALP SERPL-CCNC: 185 U/L (ref 35–104)
ALT SERPL-CCNC: 97 U/L (ref 10–35)
AST SERPL-CCNC: 53 U/L (ref 10–35)
BILIRUB DIRECT SERPL-MCNC: <0.1 MG/DL (ref 0–0.3)
BILIRUB INDIRECT SERPL-MCNC: ABNORMAL MG/DL (ref 0–1)
BILIRUB SERPL-MCNC: <0.2 MG/DL (ref 0–1.2)
PROT SERPL-MCNC: 7.9 G/DL (ref 6.6–8.7)

## 2025-08-04 PROCEDURE — 1240000000 HC EMOTIONAL WELLNESS R&B

## 2025-08-04 PROCEDURE — 6370000000 HC RX 637 (ALT 250 FOR IP): Performed by: PSYCHIATRY & NEUROLOGY

## 2025-08-04 PROCEDURE — 99232 SBSQ HOSP IP/OBS MODERATE 35: CPT | Performed by: PSYCHIATRY & NEUROLOGY

## 2025-08-04 PROCEDURE — 36415 COLL VENOUS BLD VENIPUNCTURE: CPT

## 2025-08-04 PROCEDURE — 80076 HEPATIC FUNCTION PANEL: CPT

## 2025-08-04 PROCEDURE — 99232 SBSQ HOSP IP/OBS MODERATE 35: CPT | Performed by: INTERNAL MEDICINE

## 2025-08-04 PROCEDURE — 6370000000 HC RX 637 (ALT 250 FOR IP): Performed by: INTERNAL MEDICINE

## 2025-08-04 RX ORDER — CLOZAPINE 25 MG/1
75 TABLET ORAL NIGHTLY
Status: DISCONTINUED | OUTPATIENT
Start: 2025-08-04 | End: 2025-08-07

## 2025-08-04 RX ADMIN — CYANOCOBALAMIN TAB 1000 MCG 1000 MCG: 1000 TAB at 09:10

## 2025-08-04 RX ADMIN — FERROUS SULFATE TAB 325 MG (65 MG ELEMENTAL FE) 325 MG: 325 (65 FE) TAB at 09:10

## 2025-08-04 RX ADMIN — PANTOPRAZOLE SODIUM 40 MG: 40 TABLET, DELAYED RELEASE ORAL at 09:10

## 2025-08-04 RX ADMIN — HYDROCORTISONE 15 MG: 10 TABLET ORAL at 09:09

## 2025-08-04 RX ADMIN — LEVOTHYROXINE SODIUM 150 MCG: 0.15 TABLET ORAL at 09:10

## 2025-08-04 RX ADMIN — VENLAFAXINE HYDROCHLORIDE 225 MG: 150 CAPSULE, EXTENDED RELEASE ORAL at 09:10

## 2025-08-04 RX ADMIN — FOLIC ACID 1 MG: 1 TABLET ORAL at 09:10

## 2025-08-04 RX ADMIN — FLUDROCORTISONE ACETATE 0.1 MG: 0.1 TABLET ORAL at 09:10

## 2025-08-04 RX ADMIN — CLOZAPINE 75 MG: 25 TABLET ORAL at 20:58

## 2025-08-04 RX ADMIN — FERROUS SULFATE TAB 325 MG (65 MG ELEMENTAL FE) 325 MG: 325 (65 FE) TAB at 20:58

## 2025-08-04 RX ADMIN — PHENYTOIN SODIUM 300 MG: 100 CAPSULE, EXTENDED RELEASE ORAL at 09:09

## 2025-08-05 PROCEDURE — 6370000000 HC RX 637 (ALT 250 FOR IP): Performed by: PSYCHIATRY & NEUROLOGY

## 2025-08-05 PROCEDURE — 99232 SBSQ HOSP IP/OBS MODERATE 35: CPT | Performed by: PSYCHIATRY & NEUROLOGY

## 2025-08-05 PROCEDURE — 1240000000 HC EMOTIONAL WELLNESS R&B

## 2025-08-05 PROCEDURE — 6370000000 HC RX 637 (ALT 250 FOR IP): Performed by: INTERNAL MEDICINE

## 2025-08-05 PROCEDURE — 99231 SBSQ HOSP IP/OBS SF/LOW 25: CPT | Performed by: INTERNAL MEDICINE

## 2025-08-05 RX ADMIN — HYDROCORTISONE 15 MG: 10 TABLET ORAL at 08:36

## 2025-08-05 RX ADMIN — IBUPROFEN 400 MG: 400 TABLET ORAL at 08:35

## 2025-08-05 RX ADMIN — FERROUS SULFATE TAB 325 MG (65 MG ELEMENTAL FE) 325 MG: 325 (65 FE) TAB at 08:36

## 2025-08-05 RX ADMIN — CLOZAPINE 75 MG: 25 TABLET ORAL at 21:10

## 2025-08-05 RX ADMIN — FLUDROCORTISONE ACETATE 0.1 MG: 0.1 TABLET ORAL at 08:35

## 2025-08-05 RX ADMIN — VENLAFAXINE HYDROCHLORIDE 225 MG: 150 CAPSULE, EXTENDED RELEASE ORAL at 08:35

## 2025-08-05 RX ADMIN — CYANOCOBALAMIN TAB 1000 MCG 1000 MCG: 1000 TAB at 08:36

## 2025-08-05 RX ADMIN — LEVOTHYROXINE SODIUM 150 MCG: 0.15 TABLET ORAL at 08:35

## 2025-08-05 RX ADMIN — FERROUS SULFATE TAB 325 MG (65 MG ELEMENTAL FE) 325 MG: 325 (65 FE) TAB at 21:10

## 2025-08-05 RX ADMIN — FOLIC ACID 1 MG: 1 TABLET ORAL at 08:35

## 2025-08-05 RX ADMIN — PHENYTOIN SODIUM 300 MG: 100 CAPSULE, EXTENDED RELEASE ORAL at 08:35

## 2025-08-05 RX ADMIN — PANTOPRAZOLE SODIUM 40 MG: 40 TABLET, DELAYED RELEASE ORAL at 08:35

## 2025-08-05 ASSESSMENT — PAIN SCALES - GENERAL: PAINLEVEL_OUTOF10: 6

## 2025-08-06 PROCEDURE — 6370000000 HC RX 637 (ALT 250 FOR IP): Performed by: PSYCHIATRY & NEUROLOGY

## 2025-08-06 PROCEDURE — 6370000000 HC RX 637 (ALT 250 FOR IP): Performed by: INTERNAL MEDICINE

## 2025-08-06 PROCEDURE — 99232 SBSQ HOSP IP/OBS MODERATE 35: CPT | Performed by: PSYCHIATRY & NEUROLOGY

## 2025-08-06 PROCEDURE — 99231 SBSQ HOSP IP/OBS SF/LOW 25: CPT | Performed by: INTERNAL MEDICINE

## 2025-08-06 PROCEDURE — 1240000000 HC EMOTIONAL WELLNESS R&B

## 2025-08-06 RX ADMIN — LEVOTHYROXINE SODIUM 150 MCG: 0.15 TABLET ORAL at 08:47

## 2025-08-06 RX ADMIN — PANTOPRAZOLE SODIUM 40 MG: 40 TABLET, DELAYED RELEASE ORAL at 08:47

## 2025-08-06 RX ADMIN — HYDROCORTISONE 15 MG: 10 TABLET ORAL at 08:48

## 2025-08-06 RX ADMIN — PHENYTOIN SODIUM 300 MG: 100 CAPSULE, EXTENDED RELEASE ORAL at 08:47

## 2025-08-06 RX ADMIN — FLUDROCORTISONE ACETATE 0.1 MG: 0.1 TABLET ORAL at 08:47

## 2025-08-06 RX ADMIN — CLOZAPINE 75 MG: 25 TABLET ORAL at 20:31

## 2025-08-06 RX ADMIN — FOLIC ACID 1 MG: 1 TABLET ORAL at 08:48

## 2025-08-06 RX ADMIN — FERROUS SULFATE TAB 325 MG (65 MG ELEMENTAL FE) 325 MG: 325 (65 FE) TAB at 20:31

## 2025-08-06 RX ADMIN — FERROUS SULFATE TAB 325 MG (65 MG ELEMENTAL FE) 325 MG: 325 (65 FE) TAB at 08:47

## 2025-08-06 RX ADMIN — VENLAFAXINE HYDROCHLORIDE 225 MG: 150 CAPSULE, EXTENDED RELEASE ORAL at 08:47

## 2025-08-06 RX ADMIN — CYANOCOBALAMIN TAB 1000 MCG 1000 MCG: 1000 TAB at 08:47

## 2025-08-06 ASSESSMENT — PAIN SCALES - GENERAL
PAINLEVEL_OUTOF10: 0
PAINLEVEL_OUTOF10: 2

## 2025-08-07 PROCEDURE — 99232 SBSQ HOSP IP/OBS MODERATE 35: CPT | Performed by: PSYCHIATRY & NEUROLOGY

## 2025-08-07 PROCEDURE — 1240000000 HC EMOTIONAL WELLNESS R&B

## 2025-08-07 PROCEDURE — 99231 SBSQ HOSP IP/OBS SF/LOW 25: CPT | Performed by: INTERNAL MEDICINE

## 2025-08-07 PROCEDURE — 6370000000 HC RX 637 (ALT 250 FOR IP): Performed by: PSYCHIATRY & NEUROLOGY

## 2025-08-07 PROCEDURE — 6370000000 HC RX 637 (ALT 250 FOR IP): Performed by: INTERNAL MEDICINE

## 2025-08-07 RX ORDER — CLOZAPINE 100 MG/1
100 TABLET ORAL NIGHTLY
Status: DISCONTINUED | OUTPATIENT
Start: 2025-08-07 | End: 2025-08-13 | Stop reason: HOSPADM

## 2025-08-07 RX ADMIN — HYDROCORTISONE 15 MG: 10 TABLET ORAL at 08:54

## 2025-08-07 RX ADMIN — FERROUS SULFATE TAB 325 MG (65 MG ELEMENTAL FE) 325 MG: 325 (65 FE) TAB at 21:09

## 2025-08-07 RX ADMIN — HYDROCORTISONE: 1 CREAM TOPICAL at 21:10

## 2025-08-07 RX ADMIN — PANTOPRAZOLE SODIUM 40 MG: 40 TABLET, DELAYED RELEASE ORAL at 08:55

## 2025-08-07 RX ADMIN — CYANOCOBALAMIN TAB 1000 MCG 1000 MCG: 1000 TAB at 08:56

## 2025-08-07 RX ADMIN — PHENYTOIN SODIUM 300 MG: 100 CAPSULE, EXTENDED RELEASE ORAL at 08:54

## 2025-08-07 RX ADMIN — FLUDROCORTISONE ACETATE 0.1 MG: 0.1 TABLET ORAL at 08:54

## 2025-08-07 RX ADMIN — FERROUS SULFATE TAB 325 MG (65 MG ELEMENTAL FE) 325 MG: 325 (65 FE) TAB at 08:56

## 2025-08-07 RX ADMIN — VENLAFAXINE HYDROCHLORIDE 225 MG: 150 CAPSULE, EXTENDED RELEASE ORAL at 08:55

## 2025-08-07 RX ADMIN — FOLIC ACID 1 MG: 1 TABLET ORAL at 08:55

## 2025-08-07 RX ADMIN — LEVOTHYROXINE SODIUM 150 MCG: 0.15 TABLET ORAL at 08:55

## 2025-08-07 RX ADMIN — Medication: at 12:45

## 2025-08-07 RX ADMIN — CLOZAPINE 100 MG: 100 TABLET ORAL at 21:09

## 2025-08-07 RX ADMIN — HYDROCORTISONE: 1 CREAM TOPICAL at 08:56

## 2025-08-07 ASSESSMENT — PAIN SCALES - GENERAL: PAINLEVEL_OUTOF10: 0

## 2025-08-08 LAB
BASOPHILS # BLD: 0.05 K/UL (ref 0–0.2)
BASOPHILS NFR BLD: 1 % (ref 0–2)
CHOLEST SERPL-MCNC: 237 MG/DL (ref 0–199)
CHOLESTEROL/HDL RATIO: 4.7
EOSINOPHIL # BLD: 0.43 K/UL (ref 0–0.44)
EOSINOPHILS RELATIVE PERCENT: 6 % (ref 0–4)
ERYTHROCYTE [DISTWIDTH] IN BLOOD BY AUTOMATED COUNT: 15.1 % (ref 11.5–14.9)
HCT VFR BLD AUTO: 41.2 % (ref 36–46)
HDLC SERPL-MCNC: 50 MG/DL
HGB BLD-MCNC: 12.9 G/DL (ref 12–16)
IMM GRANULOCYTES # BLD AUTO: 0.04 K/UL (ref 0–0.3)
IMM GRANULOCYTES NFR BLD: 1 %
LDLC SERPL CALC-MCNC: 130 MG/DL (ref 0–100)
LYMPHOCYTES NFR BLD: 2.41 K/UL (ref 1.1–3.7)
LYMPHOCYTES RELATIVE PERCENT: 31 % (ref 24–44)
MCH RBC QN AUTO: 29.1 PG (ref 26–34)
MCHC RBC AUTO-ENTMCNC: 31.3 G/DL (ref 31–37)
MCV RBC AUTO: 92.8 FL (ref 80–100)
MONOCYTES NFR BLD: 0.51 K/UL (ref 0.1–1.2)
MONOCYTES NFR BLD: 7 % (ref 3–12)
NEUTROPHILS NFR BLD: 54 % (ref 36–66)
NEUTS SEG NFR BLD: 4.36 K/UL (ref 1.5–8.1)
NRBC BLD-RTO: 0 PER 100 WBC
PLATELET # BLD AUTO: 396 K/UL (ref 150–450)
PMV BLD AUTO: 8.1 FL (ref 8–13.5)
RBC # BLD AUTO: 4.44 M/UL (ref 3.95–5.11)
TRIGL SERPL-MCNC: 287 MG/DL (ref 0–149)
WBC OTHER # BLD: 7.8 K/UL (ref 3.5–11)

## 2025-08-08 PROCEDURE — 1240000000 HC EMOTIONAL WELLNESS R&B

## 2025-08-08 PROCEDURE — 99232 SBSQ HOSP IP/OBS MODERATE 35: CPT | Performed by: PSYCHIATRY & NEUROLOGY

## 2025-08-08 PROCEDURE — 85025 COMPLETE CBC W/AUTO DIFF WBC: CPT

## 2025-08-08 PROCEDURE — 36415 COLL VENOUS BLD VENIPUNCTURE: CPT

## 2025-08-08 PROCEDURE — 6370000000 HC RX 637 (ALT 250 FOR IP): Performed by: PSYCHIATRY & NEUROLOGY

## 2025-08-08 PROCEDURE — 6370000000 HC RX 637 (ALT 250 FOR IP): Performed by: INTERNAL MEDICINE

## 2025-08-08 PROCEDURE — 99231 SBSQ HOSP IP/OBS SF/LOW 25: CPT | Performed by: INTERNAL MEDICINE

## 2025-08-08 PROCEDURE — 80061 LIPID PANEL: CPT

## 2025-08-08 RX ADMIN — VENLAFAXINE HYDROCHLORIDE 225 MG: 150 CAPSULE, EXTENDED RELEASE ORAL at 08:31

## 2025-08-08 RX ADMIN — PANTOPRAZOLE SODIUM 40 MG: 40 TABLET, DELAYED RELEASE ORAL at 08:31

## 2025-08-08 RX ADMIN — PHENYTOIN SODIUM 300 MG: 100 CAPSULE, EXTENDED RELEASE ORAL at 12:21

## 2025-08-08 RX ADMIN — FOLIC ACID 1 MG: 1 TABLET ORAL at 08:31

## 2025-08-08 RX ADMIN — FERROUS SULFATE TAB 325 MG (65 MG ELEMENTAL FE) 325 MG: 325 (65 FE) TAB at 20:56

## 2025-08-08 RX ADMIN — FERROUS SULFATE TAB 325 MG (65 MG ELEMENTAL FE) 325 MG: 325 (65 FE) TAB at 08:31

## 2025-08-08 RX ADMIN — CLOZAPINE 100 MG: 100 TABLET ORAL at 20:56

## 2025-08-08 RX ADMIN — CYANOCOBALAMIN TAB 1000 MCG 1000 MCG: 1000 TAB at 08:31

## 2025-08-08 RX ADMIN — FLUDROCORTISONE ACETATE 0.1 MG: 0.1 TABLET ORAL at 12:21

## 2025-08-08 RX ADMIN — HYDROCORTISONE: 1 CREAM TOPICAL at 21:00

## 2025-08-08 RX ADMIN — LEVOTHYROXINE SODIUM 150 MCG: 0.15 TABLET ORAL at 12:19

## 2025-08-08 RX ADMIN — HYDROCORTISONE 15 MG: 10 TABLET ORAL at 12:20

## 2025-08-08 ASSESSMENT — PAIN SCALES - GENERAL: PAINLEVEL_OUTOF10: 3

## 2025-08-09 PROCEDURE — 6370000000 HC RX 637 (ALT 250 FOR IP): Performed by: PSYCHIATRY & NEUROLOGY

## 2025-08-09 PROCEDURE — 99232 SBSQ HOSP IP/OBS MODERATE 35: CPT | Performed by: NURSE PRACTITIONER

## 2025-08-09 PROCEDURE — 6370000000 HC RX 637 (ALT 250 FOR IP): Performed by: INTERNAL MEDICINE

## 2025-08-09 PROCEDURE — 1240000000 HC EMOTIONAL WELLNESS R&B

## 2025-08-09 RX ADMIN — HYDROXYZINE HYDROCHLORIDE 50 MG: 50 TABLET ORAL at 09:46

## 2025-08-09 RX ADMIN — FLUDROCORTISONE ACETATE 0.1 MG: 0.1 TABLET ORAL at 09:42

## 2025-08-09 RX ADMIN — VENLAFAXINE HYDROCHLORIDE 225 MG: 150 CAPSULE, EXTENDED RELEASE ORAL at 09:43

## 2025-08-09 RX ADMIN — LEVOTHYROXINE SODIUM 150 MCG: 0.15 TABLET ORAL at 09:42

## 2025-08-09 RX ADMIN — FERROUS SULFATE TAB 325 MG (65 MG ELEMENTAL FE) 325 MG: 325 (65 FE) TAB at 09:42

## 2025-08-09 RX ADMIN — CLOZAPINE 100 MG: 100 TABLET ORAL at 20:51

## 2025-08-09 RX ADMIN — IBUPROFEN 400 MG: 400 TABLET ORAL at 09:46

## 2025-08-09 RX ADMIN — FERROUS SULFATE TAB 325 MG (65 MG ELEMENTAL FE) 325 MG: 325 (65 FE) TAB at 20:51

## 2025-08-09 RX ADMIN — PANTOPRAZOLE SODIUM 40 MG: 40 TABLET, DELAYED RELEASE ORAL at 06:54

## 2025-08-09 RX ADMIN — HYDROXYZINE HYDROCHLORIDE 50 MG: 50 TABLET ORAL at 20:51

## 2025-08-09 RX ADMIN — HYDROCORTISONE 15 MG: 10 TABLET ORAL at 09:43

## 2025-08-09 RX ADMIN — HYDROCORTISONE: 1 CREAM TOPICAL at 20:54

## 2025-08-09 RX ADMIN — PHENYTOIN SODIUM 300 MG: 100 CAPSULE, EXTENDED RELEASE ORAL at 09:42

## 2025-08-09 RX ADMIN — CYANOCOBALAMIN TAB 1000 MCG 1000 MCG: 1000 TAB at 09:43

## 2025-08-09 RX ADMIN — FOLIC ACID 1 MG: 1 TABLET ORAL at 09:42

## 2025-08-09 ASSESSMENT — PAIN SCALES - GENERAL: PAINLEVEL_OUTOF10: 4

## 2025-08-09 ASSESSMENT — PAIN - FUNCTIONAL ASSESSMENT: PAIN_FUNCTIONAL_ASSESSMENT: 0-10

## 2025-08-09 ASSESSMENT — PAIN DESCRIPTION - LOCATION: LOCATION: GENERALIZED

## 2025-08-10 PROCEDURE — 6370000000 HC RX 637 (ALT 250 FOR IP): Performed by: PSYCHIATRY & NEUROLOGY

## 2025-08-10 PROCEDURE — 1240000000 HC EMOTIONAL WELLNESS R&B

## 2025-08-10 PROCEDURE — 99232 SBSQ HOSP IP/OBS MODERATE 35: CPT | Performed by: NURSE PRACTITIONER

## 2025-08-10 PROCEDURE — 6370000000 HC RX 637 (ALT 250 FOR IP): Performed by: INTERNAL MEDICINE

## 2025-08-10 RX ADMIN — HYDROXYZINE HYDROCHLORIDE 50 MG: 50 TABLET ORAL at 08:36

## 2025-08-10 RX ADMIN — FERROUS SULFATE TAB 325 MG (65 MG ELEMENTAL FE) 325 MG: 325 (65 FE) TAB at 08:36

## 2025-08-10 RX ADMIN — CLOZAPINE 100 MG: 100 TABLET ORAL at 21:03

## 2025-08-10 RX ADMIN — FLUDROCORTISONE ACETATE 0.1 MG: 0.1 TABLET ORAL at 08:36

## 2025-08-10 RX ADMIN — FOLIC ACID 1 MG: 1 TABLET ORAL at 08:36

## 2025-08-10 RX ADMIN — IBUPROFEN 400 MG: 400 TABLET ORAL at 21:03

## 2025-08-10 RX ADMIN — CYANOCOBALAMIN TAB 1000 MCG 1000 MCG: 1000 TAB at 08:36

## 2025-08-10 RX ADMIN — FERROUS SULFATE TAB 325 MG (65 MG ELEMENTAL FE) 325 MG: 325 (65 FE) TAB at 21:03

## 2025-08-10 RX ADMIN — PANTOPRAZOLE SODIUM 40 MG: 40 TABLET, DELAYED RELEASE ORAL at 08:36

## 2025-08-10 RX ADMIN — PHENYTOIN SODIUM 300 MG: 100 CAPSULE, EXTENDED RELEASE ORAL at 08:35

## 2025-08-10 RX ADMIN — HYDROXYZINE HYDROCHLORIDE 50 MG: 50 TABLET ORAL at 21:03

## 2025-08-10 RX ADMIN — LEVOTHYROXINE SODIUM 150 MCG: 0.15 TABLET ORAL at 08:36

## 2025-08-10 RX ADMIN — HYDROCORTISONE 15 MG: 10 TABLET ORAL at 08:36

## 2025-08-10 RX ADMIN — VENLAFAXINE HYDROCHLORIDE 225 MG: 150 CAPSULE, EXTENDED RELEASE ORAL at 08:36

## 2025-08-10 ASSESSMENT — PAIN DESCRIPTION - DESCRIPTORS: DESCRIPTORS: ACHING;CRAMPING

## 2025-08-10 ASSESSMENT — PAIN - FUNCTIONAL ASSESSMENT: PAIN_FUNCTIONAL_ASSESSMENT: 0-10

## 2025-08-10 ASSESSMENT — PAIN SCALES - GENERAL
PAINLEVEL_OUTOF10: 0
PAINLEVEL_OUTOF10: 2

## 2025-08-10 ASSESSMENT — PAIN DESCRIPTION - LOCATION: LOCATION: ABDOMEN

## 2025-08-11 PROCEDURE — 6370000000 HC RX 637 (ALT 250 FOR IP): Performed by: PSYCHIATRY & NEUROLOGY

## 2025-08-11 PROCEDURE — 99232 SBSQ HOSP IP/OBS MODERATE 35: CPT | Performed by: PSYCHIATRY & NEUROLOGY

## 2025-08-11 PROCEDURE — 6370000000 HC RX 637 (ALT 250 FOR IP): Performed by: INTERNAL MEDICINE

## 2025-08-11 PROCEDURE — 99232 SBSQ HOSP IP/OBS MODERATE 35: CPT | Performed by: INTERNAL MEDICINE

## 2025-08-11 PROCEDURE — 1240000000 HC EMOTIONAL WELLNESS R&B

## 2025-08-11 RX ADMIN — HYDROCORTISONE: 1 CREAM TOPICAL at 21:05

## 2025-08-11 RX ADMIN — IBUPROFEN 400 MG: 400 TABLET ORAL at 21:05

## 2025-08-11 RX ADMIN — FERROUS SULFATE TAB 325 MG (65 MG ELEMENTAL FE) 325 MG: 325 (65 FE) TAB at 21:06

## 2025-08-11 RX ADMIN — VENLAFAXINE HYDROCHLORIDE 225 MG: 150 CAPSULE, EXTENDED RELEASE ORAL at 08:22

## 2025-08-11 RX ADMIN — FOLIC ACID 1 MG: 1 TABLET ORAL at 08:22

## 2025-08-11 RX ADMIN — FERROUS SULFATE TAB 325 MG (65 MG ELEMENTAL FE) 325 MG: 325 (65 FE) TAB at 08:22

## 2025-08-11 RX ADMIN — FLUDROCORTISONE ACETATE 0.1 MG: 0.1 TABLET ORAL at 08:23

## 2025-08-11 RX ADMIN — CYANOCOBALAMIN TAB 1000 MCG 1000 MCG: 1000 TAB at 08:22

## 2025-08-11 RX ADMIN — PANTOPRAZOLE SODIUM 40 MG: 40 TABLET, DELAYED RELEASE ORAL at 07:13

## 2025-08-11 RX ADMIN — HYDROXYZINE HYDROCHLORIDE 50 MG: 50 TABLET ORAL at 21:05

## 2025-08-11 RX ADMIN — CLOZAPINE 100 MG: 100 TABLET ORAL at 21:05

## 2025-08-11 RX ADMIN — LEVOTHYROXINE SODIUM 150 MCG: 0.15 TABLET ORAL at 07:16

## 2025-08-11 RX ADMIN — HYDROCORTISONE 15 MG: 10 TABLET ORAL at 08:22

## 2025-08-11 RX ADMIN — PHENYTOIN SODIUM 300 MG: 100 CAPSULE, EXTENDED RELEASE ORAL at 08:23

## 2025-08-11 ASSESSMENT — PAIN SCALES - GENERAL
PAINLEVEL_OUTOF10: 3
PAINLEVEL_OUTOF10: 2
PAINLEVEL_OUTOF10: 2

## 2025-08-11 ASSESSMENT — PAIN DESCRIPTION - DESCRIPTORS
DESCRIPTORS: CRAMPING
DESCRIPTORS: CRAMPING

## 2025-08-11 ASSESSMENT — PAIN DESCRIPTION - LOCATION
LOCATION: ABDOMEN
LOCATION: ABDOMEN

## 2025-08-11 ASSESSMENT — PAIN - FUNCTIONAL ASSESSMENT: PAIN_FUNCTIONAL_ASSESSMENT: 0-10

## 2025-08-12 LAB
CK SERPL-CCNC: 31 U/L (ref 26–192)
TROPONIN I SERPL HS-MCNC: <6 NG/L (ref 0–14)

## 2025-08-12 PROCEDURE — 36415 COLL VENOUS BLD VENIPUNCTURE: CPT

## 2025-08-12 PROCEDURE — 1240000000 HC EMOTIONAL WELLNESS R&B

## 2025-08-12 PROCEDURE — 6370000000 HC RX 637 (ALT 250 FOR IP): Performed by: PSYCHIATRY & NEUROLOGY

## 2025-08-12 PROCEDURE — 99232 SBSQ HOSP IP/OBS MODERATE 35: CPT | Performed by: PSYCHIATRY & NEUROLOGY

## 2025-08-12 PROCEDURE — 84484 ASSAY OF TROPONIN QUANT: CPT

## 2025-08-12 PROCEDURE — 82550 ASSAY OF CK (CPK): CPT

## 2025-08-12 PROCEDURE — 6370000000 HC RX 637 (ALT 250 FOR IP): Performed by: INTERNAL MEDICINE

## 2025-08-12 PROCEDURE — 99232 SBSQ HOSP IP/OBS MODERATE 35: CPT | Performed by: INTERNAL MEDICINE

## 2025-08-12 RX ORDER — PERMETHRIN 50 MG/G
CREAM TOPICAL ONCE
Status: DISCONTINUED | OUTPATIENT
Start: 2025-08-12 | End: 2025-08-12

## 2025-08-12 RX ADMIN — PANTOPRAZOLE SODIUM 40 MG: 40 TABLET, DELAYED RELEASE ORAL at 08:50

## 2025-08-12 RX ADMIN — CYANOCOBALAMIN TAB 1000 MCG 1000 MCG: 1000 TAB at 08:50

## 2025-08-12 RX ADMIN — FOLIC ACID 1 MG: 1 TABLET ORAL at 08:50

## 2025-08-12 RX ADMIN — FLUDROCORTISONE ACETATE 0.1 MG: 0.1 TABLET ORAL at 08:51

## 2025-08-12 RX ADMIN — LEVOTHYROXINE SODIUM 150 MCG: 0.15 TABLET ORAL at 08:51

## 2025-08-12 RX ADMIN — FERROUS SULFATE TAB 325 MG (65 MG ELEMENTAL FE) 325 MG: 325 (65 FE) TAB at 08:50

## 2025-08-12 RX ADMIN — VENLAFAXINE HYDROCHLORIDE 225 MG: 150 CAPSULE, EXTENDED RELEASE ORAL at 08:51

## 2025-08-12 RX ADMIN — FERROUS SULFATE TAB 325 MG (65 MG ELEMENTAL FE) 325 MG: 325 (65 FE) TAB at 20:42

## 2025-08-12 RX ADMIN — CLOZAPINE 100 MG: 100 TABLET ORAL at 20:42

## 2025-08-12 RX ADMIN — PHENYTOIN SODIUM 300 MG: 100 CAPSULE, EXTENDED RELEASE ORAL at 08:51

## 2025-08-12 RX ADMIN — HYDROCORTISONE 15 MG: 10 TABLET ORAL at 08:51

## 2025-08-12 RX ADMIN — HYDROXYZINE HYDROCHLORIDE 50 MG: 50 TABLET ORAL at 20:42

## 2025-08-12 ASSESSMENT — PAIN SCALES - GENERAL: PAINLEVEL_OUTOF10: 3

## 2025-08-13 ENCOUNTER — HOSPITAL ENCOUNTER (EMERGENCY)
Age: 50
Discharge: HOME | End: 2025-08-13
Attending: EMERGENCY MEDICINE
Payer: COMMERCIAL

## 2025-08-13 VITALS
HEIGHT: 72 IN | BODY MASS INDEX: 25.06 KG/M2 | OXYGEN SATURATION: 98 % | TEMPERATURE: 98 F | WEIGHT: 185 LBS | SYSTOLIC BLOOD PRESSURE: 86 MMHG | DIASTOLIC BLOOD PRESSURE: 64 MMHG | RESPIRATION RATE: 14 BRPM | HEART RATE: 91 BPM

## 2025-08-13 VITALS
OXYGEN SATURATION: 96 % | RESPIRATION RATE: 18 BRPM | TEMPERATURE: 97.6 F | HEART RATE: 101 BPM | WEIGHT: 185 LBS | SYSTOLIC BLOOD PRESSURE: 132 MMHG | BODY MASS INDEX: 25.06 KG/M2 | DIASTOLIC BLOOD PRESSURE: 96 MMHG | HEIGHT: 72 IN

## 2025-08-13 DIAGNOSIS — R45.851 SUICIDAL THOUGHTS: ICD-10-CM

## 2025-08-13 DIAGNOSIS — F32.A CHRONIC DEPRESSION: Primary | ICD-10-CM

## 2025-08-13 PROCEDURE — 6370000000 HC RX 637 (ALT 250 FOR IP): Performed by: PSYCHIATRY & NEUROLOGY

## 2025-08-13 PROCEDURE — 6370000000 HC RX 637 (ALT 250 FOR IP): Performed by: INTERNAL MEDICINE

## 2025-08-13 PROCEDURE — 99285 EMERGENCY DEPT VISIT HI MDM: CPT

## 2025-08-13 PROCEDURE — 99231 SBSQ HOSP IP/OBS SF/LOW 25: CPT | Performed by: INTERNAL MEDICINE

## 2025-08-13 PROCEDURE — 99239 HOSP IP/OBS DSCHRG MGMT >30: CPT | Performed by: PSYCHIATRY & NEUROLOGY

## 2025-08-13 RX ORDER — FLUDROCORTISONE ACETATE 0.1 MG/1
0.1 TABLET ORAL DAILY
Qty: 30 TABLET | Refills: 0 | Status: SHIPPED | OUTPATIENT
Start: 2025-08-13 | End: 2025-09-12

## 2025-08-13 RX ORDER — LEVOTHYROXINE SODIUM 150 UG/1
150 TABLET ORAL DAILY
Qty: 30 TABLET | Refills: 3 | Status: SHIPPED | OUTPATIENT
Start: 2025-08-13

## 2025-08-13 RX ORDER — VENLAFAXINE HYDROCHLORIDE 75 MG/1
225 CAPSULE, EXTENDED RELEASE ORAL
Qty: 30 CAPSULE | Refills: 3 | Status: SHIPPED | OUTPATIENT
Start: 2025-08-14

## 2025-08-13 RX ORDER — CLOZAPINE 100 MG/1
100 TABLET ORAL NIGHTLY
Qty: 30 TABLET | Refills: 3 | Status: SHIPPED | OUTPATIENT
Start: 2025-08-13

## 2025-08-13 RX ORDER — BENZOCAINE/MENTHOL 6 MG-10 MG
LOZENGE MUCOUS MEMBRANE
Qty: 30 G | Refills: 1 | Status: SHIPPED | OUTPATIENT
Start: 2025-08-13 | End: 2025-08-20

## 2025-08-13 RX ORDER — LANOLIN ALCOHOL/MO/W.PET/CERES
1000 CREAM (GRAM) TOPICAL DAILY
Qty: 30 TABLET | Refills: 0 | Status: SHIPPED | OUTPATIENT
Start: 2025-08-13

## 2025-08-13 RX ORDER — HYDROCORTISONE 5 MG/1
15 TABLET ORAL DAILY
Qty: 90 TABLET | Refills: 0 | Status: SHIPPED | OUTPATIENT
Start: 2025-08-13

## 2025-08-13 RX ORDER — PHENYTOIN SODIUM 300 MG/1
300 CAPSULE, EXTENDED RELEASE ORAL DAILY
Qty: 60 CAPSULE | Refills: 3 | Status: SHIPPED | OUTPATIENT
Start: 2025-08-13

## 2025-08-13 RX ORDER — TRAZODONE HYDROCHLORIDE 50 MG/1
50 TABLET ORAL NIGHTLY PRN
Qty: 30 TABLET | Refills: 0 | Status: SHIPPED | OUTPATIENT
Start: 2025-08-13

## 2025-08-13 RX ORDER — FOLIC ACID 1 MG/1
1 TABLET ORAL DAILY
Qty: 30 TABLET | Refills: 0 | Status: SHIPPED | OUTPATIENT
Start: 2025-08-13

## 2025-08-13 RX ORDER — PANTOPRAZOLE SODIUM 40 MG/1
40 TABLET, DELAYED RELEASE ORAL
Qty: 30 TABLET | Refills: 3 | Status: SHIPPED | OUTPATIENT
Start: 2025-08-13

## 2025-08-13 RX ADMIN — CYANOCOBALAMIN TAB 1000 MCG 1000 MCG: 1000 TAB at 09:02

## 2025-08-13 RX ADMIN — LEVOTHYROXINE SODIUM 150 MCG: 0.15 TABLET ORAL at 11:17

## 2025-08-13 RX ADMIN — FLUDROCORTISONE ACETATE 0.1 MG: 0.1 TABLET ORAL at 09:02

## 2025-08-13 RX ADMIN — FOLIC ACID 1 MG: 1 TABLET ORAL at 09:01

## 2025-08-13 RX ADMIN — VENLAFAXINE HYDROCHLORIDE 225 MG: 150 CAPSULE, EXTENDED RELEASE ORAL at 09:02

## 2025-08-13 RX ADMIN — PHENYTOIN SODIUM 300 MG: 100 CAPSULE, EXTENDED RELEASE ORAL at 09:00

## 2025-08-13 RX ADMIN — HYDROCORTISONE 15 MG: 10 TABLET ORAL at 09:05

## 2025-08-13 RX ADMIN — FERROUS SULFATE TAB 325 MG (65 MG ELEMENTAL FE) 325 MG: 325 (65 FE) TAB at 09:01

## 2025-08-13 RX ADMIN — PANTOPRAZOLE SODIUM 40 MG: 40 TABLET, DELAYED RELEASE ORAL at 09:01

## 2025-08-13 ASSESSMENT — PAIN SCALES - GENERAL
PAINLEVEL_OUTOF10: 0
PAINLEVEL_OUTOF10: 0

## 2025-08-13 ASSESSMENT — PAIN - FUNCTIONAL ASSESSMENT: PAIN_FUNCTIONAL_ASSESSMENT: 0-10

## 2025-08-14 ENCOUNTER — CARE COORDINATION (OUTPATIENT)
Dept: CARE COORDINATION | Age: 50
End: 2025-08-14

## 2025-08-27 ENCOUNTER — CARE COORDINATION (OUTPATIENT)
Dept: CARE COORDINATION | Age: 50
End: 2025-08-27